# Patient Record
Sex: MALE | Race: WHITE | NOT HISPANIC OR LATINO | Employment: OTHER | URBAN - METROPOLITAN AREA
[De-identification: names, ages, dates, MRNs, and addresses within clinical notes are randomized per-mention and may not be internally consistent; named-entity substitution may affect disease eponyms.]

---

## 2019-07-29 ENCOUNTER — COMMUNICATION - HEALTHEAST (OUTPATIENT)
Dept: TELEHEALTH | Facility: CLINIC | Age: 69
End: 2019-07-29

## 2019-07-29 ENCOUNTER — AMBULATORY - HEALTHEAST (OUTPATIENT)
Dept: FAMILY MEDICINE | Facility: CLINIC | Age: 69
End: 2019-07-29

## 2019-07-29 ENCOUNTER — OFFICE VISIT - HEALTHEAST (OUTPATIENT)
Dept: FAMILY MEDICINE | Facility: CLINIC | Age: 69
End: 2019-07-29

## 2019-07-29 DIAGNOSIS — I10 ESSENTIAL HYPERTENSION: ICD-10-CM

## 2019-07-29 DIAGNOSIS — L30.9 CHRONIC DERMATITIS OF HANDS: ICD-10-CM

## 2019-07-29 RX ORDER — BETAMETHASONE DIPROPIONATE 0.05 %
OINTMENT (GRAM) TOPICAL
Status: SHIPPED | COMMUNITY
Start: 2018-02-14 | End: 2023-08-21

## 2019-07-29 RX ORDER — LORATADINE 10 MG/1
10 TABLET ORAL DAILY
Status: SHIPPED | COMMUNITY
Start: 2019-07-29

## 2019-07-29 RX ORDER — DIPHENHYDRAMINE HCL 25 MG
25 CAPSULE ORAL EVERY 6 HOURS PRN
Status: SHIPPED | COMMUNITY
Start: 2019-07-29 | End: 2023-08-21

## 2019-07-29 ASSESSMENT — MIFFLIN-ST. JEOR: SCORE: 1660.16

## 2019-10-22 ENCOUNTER — OFFICE VISIT - HEALTHEAST (OUTPATIENT)
Dept: FAMILY MEDICINE | Facility: CLINIC | Age: 69
End: 2019-10-22

## 2019-10-22 DIAGNOSIS — I10 ESSENTIAL HYPERTENSION: ICD-10-CM

## 2019-10-22 RX ORDER — AMLODIPINE BESYLATE 5 MG/1
5 TABLET ORAL DAILY
Qty: 90 TABLET | Refills: 3 | Status: SHIPPED | OUTPATIENT
Start: 2019-10-22 | End: 2021-07-30

## 2020-09-28 ENCOUNTER — OFFICE VISIT - HEALTHEAST (OUTPATIENT)
Dept: FAMILY MEDICINE | Facility: CLINIC | Age: 70
End: 2020-09-28

## 2020-09-28 DIAGNOSIS — I10 ESSENTIAL HYPERTENSION: ICD-10-CM

## 2020-09-28 RX ORDER — AMLODIPINE BESYLATE 5 MG/1
5 TABLET ORAL DAILY
Qty: 90 TABLET | Refills: 3 | Status: SHIPPED | OUTPATIENT
Start: 2020-09-28 | End: 2021-07-30

## 2021-01-15 ENCOUNTER — AMBULATORY - HEALTHEAST (OUTPATIENT)
Dept: OTHER | Facility: CLINIC | Age: 71
End: 2021-01-15

## 2021-04-15 ENCOUNTER — AMBULATORY - HEALTHEAST (OUTPATIENT)
Dept: NURSING | Facility: CLINIC | Age: 71
End: 2021-04-15

## 2021-05-06 ENCOUNTER — AMBULATORY - HEALTHEAST (OUTPATIENT)
Dept: NURSING | Facility: CLINIC | Age: 71
End: 2021-05-06

## 2021-06-02 NOTE — PROGRESS NOTES
Patient ID: Sage Jones is a 69 y.o. male.  /78   Pulse 72   Wt 193 lb 11.2 oz (87.9 kg)   SpO2 98%   BMI 28.40 kg/m      Assessment/Plan:                Diagnoses and all orders for this visit:    Essential hypertension  -     amLODIPine (NORVASC) 5 MG tablet; Take 1 tablet (5 mg total) by mouth daily.  Dispense: 90 tablet; Refill: 3      DISCUSSION  Continue amlodipine for blood pressure management.  Patient declines any consideration to obtain lab test to evaluate kidney function, blood sugar or cholesterol.  Subjective:     HPI    Sage Jones is a 69 y.o. male who is here today to follow-up on hypertension.  He is on amlodipine.  Blood pressure today is reasonable.  Patient states he monitors blood pressure at home.  Reports readings sometimes up to 140 but generally with any more favorable range.  Denies chest pain shortness of breath dizziness lightheadedness or syncope.  No lower extremity edema.  Patient expresses desire to take a more minimalist approach to his health care.  Today we discussed recommended routine health preventive measures including reviewing immunizations.  We discussed specific immunizations including tetanus with pertussis component, pneumonia vaccines, influenza vaccines.  He declines vaccines at this time but will consider this further in the future.  He has not had colon cancer screening.  He does not wish to pursue colonoscopy.  He is familiar with the Cologuard test and will consider this in the future but declines any consideration to proceed with this today.  He otherwise overall states he feels well and has no other concerns.    Review of Systems  Complete review of systems is obtained.  Other than the specific considerations noted above complete review of systems is negative.      Objective:   Medications:  Current Outpatient Medications   Medication Sig     amLODIPine (NORVASC) 5 MG tablet Take 1 tablet (5 mg total) by mouth daily.     ascorbic acid,  vitamin C, (VITAMIN C) 1000 MG tablet Take 1,000 mg by mouth daily.     b complex vitamins tablet Take 1 tablet by mouth daily.     betamethasone dipropionate (DIPROLENE) 0.05 % ointment Apply topically.     diphenhydrAMINE (BENADRYL) 25 mg capsule Take 25 mg by mouth every 6 (six) hours as needed for itching.     loratadine (CLARITIN) 10 mg tablet Take 10 mg by mouth daily.     Allergies:  No Known Allergies    Tobacco:   reports that he has never smoked. He has never used smokeless tobacco.     Physical Exam      /78   Pulse 72   Wt 193 lb 11.2 oz (87.9 kg)   SpO2 98%   BMI 28.40 kg/m        General: Patient alert no signs of distress    Heart: Regular rate and rhythm no murmur

## 2021-06-03 VITALS — WEIGHT: 200.8 LBS | HEIGHT: 69 IN | BODY MASS INDEX: 29.74 KG/M2

## 2021-06-03 VITALS
WEIGHT: 193.7 LBS | SYSTOLIC BLOOD PRESSURE: 136 MMHG | BODY MASS INDEX: 28.4 KG/M2 | DIASTOLIC BLOOD PRESSURE: 78 MMHG | OXYGEN SATURATION: 98 % | HEART RATE: 72 BPM

## 2021-06-11 NOTE — PROGRESS NOTES
"Sage Jones is a 70 y.o. male who is being evaluated via a billable telephone visit.      The patient has been notified of following:     \"This telephone visit will be conducted via a call between you and your physician/provider. We have found that certain health care needs can be provided without the need for a physical exam.  This service lets us provide the care you need with a short phone conversation.  If a prescription is necessary we can send it directly to your pharmacy.  If lab work is needed we can place an order for that and you can then stop by our lab to have the test done at a later time.    Telephone visits are billed at different rates depending on your insurance coverage. During this emergency period, for some insurers they may be billed the same as an in-person visit.  Please reach out to your insurance provider with any questions.    If during the course of the call the physician/provider feels a telephone visit is not appropriate, you will not be charged for this service.\"    Patient has given verbal consent to a Telephone visit? Yes    What phone number would you like to be contacted at? [phone]    Patient would like to receive their AVS by AVS Preference: Abelardo.    Sage Jones is a 70 y.o. male who is generally healthy.  He is treated for hypertension with amlodipine 5 mg.  I met him for the first time last July in 2019.  At that time had declined routine health considerations including immunization and cancer screening along with lab testing.  Patient reports he retired in August.  He feels well.  He checks blood pressure at home reading reported 130 systolic.  Denies chest pain or shortness of breath.  Feels no side effects of amlodipine.  Declines laboratory testing.  Declines considerations for immunization or other screening tests which are again discussed.    Assessment/Plan:      Diagnoses and all orders for this visit:    Essential hypertension  -     amLODIPine " (NORVASC) 5 MG tablet; Take 1 tablet (5 mg total) by mouth daily.  Dispense: 90 tablet; Refill: 3          Continue amlodipine.  Monitor blood pressure at home.  Follow-up in no more than 1 year.  Contemplate routine screening tests if he decides he would like to get them I be happy to set them up as discussed.      Phone call duration:  5 minutes    William Meng MD

## 2021-07-02 ENCOUNTER — RECORDS - HEALTHEAST (OUTPATIENT)
Dept: FAMILY MEDICINE | Facility: CLINIC | Age: 71
End: 2021-07-02

## 2021-07-04 NOTE — TELEPHONE ENCOUNTER
Telephone Encounter by Valerie Mason CMA at 7/2/2021 12:04 PM     Author: Valerie Mason CMA Service: -- Author Type: Certified Medical Assistant    Filed: 7/2/2021 12:05 PM Encounter Date: 7/2/2021 Status: Signed    : Valerie Mason CMA (Certified Medical Assistant)       Pt called and helped him schedule AWV appt to see Dr. Meng on Friday 07/30/2021 at 2 PM.

## 2021-07-04 NOTE — TELEPHONE ENCOUNTER
Telephone Encounter by Lidia Barr CMA at 7/2/2021 11:08 AM     Author: Lidia Barr CMA Service: -- Author Type: Certified Medical Assistant    Filed: 7/2/2021 11:08 AM Encounter Date: 7/2/2021 Status: Signed    : Lidia Barr CMA (Certified Medical Assistant)       lmtcb- pt is due for annual wellness physical. Please schedule with Chinyere Molina if pt would like to be seen.

## 2021-07-30 ENCOUNTER — OFFICE VISIT (OUTPATIENT)
Dept: FAMILY MEDICINE | Facility: CLINIC | Age: 71
End: 2021-07-30
Payer: MEDICARE

## 2021-07-30 VITALS
BODY MASS INDEX: 31.68 KG/M2 | SYSTOLIC BLOOD PRESSURE: 138 MMHG | HEIGHT: 69 IN | HEART RATE: 75 BPM | WEIGHT: 213.9 LBS | DIASTOLIC BLOOD PRESSURE: 78 MMHG | OXYGEN SATURATION: 98 %

## 2021-07-30 DIAGNOSIS — Z00.00 ENCOUNTER FOR ROUTINE ADULT HEALTH EXAMINATION WITHOUT ABNORMAL FINDINGS: Primary | ICD-10-CM

## 2021-07-30 DIAGNOSIS — I10 ESSENTIAL HYPERTENSION: ICD-10-CM

## 2021-07-30 PROCEDURE — 90715 TDAP VACCINE 7 YRS/> IM: CPT | Performed by: FAMILY MEDICINE

## 2021-07-30 PROCEDURE — G0009 ADMIN PNEUMOCOCCAL VACCINE: HCPCS | Performed by: FAMILY MEDICINE

## 2021-07-30 PROCEDURE — G0439 PPPS, SUBSEQ VISIT: HCPCS | Performed by: FAMILY MEDICINE

## 2021-07-30 PROCEDURE — 90472 IMMUNIZATION ADMIN EACH ADD: CPT | Performed by: FAMILY MEDICINE

## 2021-07-30 PROCEDURE — 99213 OFFICE O/P EST LOW 20 MIN: CPT | Mod: 25 | Performed by: FAMILY MEDICINE

## 2021-07-30 PROCEDURE — 90670 PCV13 VACCINE IM: CPT | Performed by: FAMILY MEDICINE

## 2021-07-30 RX ORDER — AMLODIPINE BESYLATE 5 MG/1
5 TABLET ORAL DAILY
Qty: 90 TABLET | Refills: 3 | Status: SHIPPED | OUTPATIENT
Start: 2021-07-30 | End: 2022-09-11

## 2021-07-30 ASSESSMENT — ACTIVITIES OF DAILY LIVING (ADL)
DRESSING/BATHING_DIFFICULTY: NO
CONCENTRATING,_REMEMBERING_OR_MAKING_DECISIONS_DIFFICULTY: NO
CURRENT_FUNCTION: NO ASSISTANCE NEEDED
TOILETING_ISSUES: NO
DIFFICULTY_EATING/SWALLOWING: NO
DIFFICULTY_COMMUNICATING: NO
WEAR_GLASSES_OR_BLIND: YES
HEARING_DIFFICULTY_OR_DEAF: NO
FALL_HISTORY_WITHIN_LAST_SIX_MONTHS: NO
DOING_ERRANDS_INDEPENDENTLY_DIFFICULTY: NO
WALKING_OR_CLIMBING_STAIRS_DIFFICULTY: NO
PATIENT_/_FAMILY_COMMUNICATION_STYLE: SPOKEN LANGUAGE (ENGLISH OR BILINGUAL)

## 2021-07-30 ASSESSMENT — MIFFLIN-ST. JEOR: SCORE: 1719.58

## 2021-07-30 NOTE — PROGRESS NOTES
SUBJECTIVE:   Sage Jones is a 71 year old male who presents for Preventive Visit.      Patient has been advised of split billing requirements and indicates understanding: Yes   Are you in the first 12 months of your Medicare coverage?  No    HPI  Do you feel safe in your environment? Yes    His medical history includes hypertension for which he takes amlodipine.  His blood pressure is reasonably controlled based on readings taken here.  Patient also reports favorable readings taken at home.  Declines laboratory testing.  Declines routine screenings such as colonoscopy prostate cancer screening of the leg.  He did have Covid in March 2020 he reports making a full recovery.  He has made the decision to receive some vaccines and has elected to have the pneumonia vaccine and tetanus vaccine with pertussis today.  He has elected to take a more conservative approach to his health care.  Discussed benefits of routine screening and immunizations today as we have in the past.  He is a [employer] .  He is .  Does not smoke or drink alcohol.  Denies other past medical history.  He has never had surgery.  Overall feels well.  Reviewed social and family history otherwise.    Have you ever done Advance Care Planning? (For example, a Health Directive, POLST, or a discussion with a medical provider or your loved ones about your wishes): Yes, advance care planning is on file.    Fallen 2 or more times in the past year?: No  Any fall with injury in the past year?: No    Cognitive Screening   1) Repeat 3 items  2) Clock draw: NORMAL  3) 3 item recall: Recalls 3 objects  Results: 3 items recalled: COGNITIVE IMPAIRMENT LESS LIKELY    Mini-CogTM Copyright LETICIA Jameson. Licensed by the author for use in [facility]; reprinted with permission (walter@.[facility]). All rights reserved.      Do you have sleep apnea, excessive snoring or daytime drowsiness?: no    Reviewed and updated as needed this visit by  "clinical staff  Reviewed and updated as needed this visit by Provider                Social History     Tobacco Use     Smoking status: Never Smoker     Smokeless tobacco: Never Used   Substance Use Topics     Alcohol use: Not on file     If you drink alcohol do you typically have >3 drinks per day or >7 drinks per week? No    Alcohol Use 7/30/2021   Prescreen: >3 drinks/day or >7 drinks/week? Not Applicable   Prescreen: >3 drinks/day or >7 drinks/week? -   No flowsheet data found.            Current providers sharing in care for this patient include: {  Patient Care Team:  William Meng MD as PCP - General  William Meng MD as Assigned PCP    The following health maintenance items are reviewed in Epic and correct as of today:  Health Maintenance Due   Topic Date Due     COLORECTAL CANCER SCREENING  Never done     HEPATITIS C SCREENING  Never done     DTAP/TDAP/TD IMMUNIZATION (1 - Tdap) Never done     LIPID  Never done     ZOSTER IMMUNIZATION (1 of 2) Never done     FALL RISK ASSESSMENT  Never done     Pneumococcal Vaccine: 65+ Years (1 of 1 - PPSV23) Never done     AORTIC ANEURYSM SCREENING (SYSTEM ASSIGNED)  Never done               Review of Systems  Complete review of systems is obtained.  Other than the specific considerations noted above complete review of systems is negative.      OBJECTIVE:   /78   Pulse 75   Ht 1.759 m (5' 9.25\")   Wt 97 kg (213 lb 14.4 oz)   SpO2 98%   BMI 31.36 kg/m   Estimated body mass index is 31.36 kg/m  as calculated from the following:    Height as of this encounter: 1.759 m (5' 9.25\").    Weight as of this encounter: 97 kg (213 lb 14.4 oz).  Physical Exam      General Appearance:    Alert, cooperative, no distress   Eyes:   No scleral icterus or conjunctival irritation       Lungs:     Clear to auscultation bilaterally, respirations unlabored, no wheezes or crackles   Heart:    Regular rate and rhythm,  No murmur   Extremities:  No edema, no joint swelling or " "redness, no evidence of any injuries   Skin:  No concerning skin findings, no suspicious moles, no rashes   Neurologic:  On gross examination there is no motor or sensory deficit.  Patient walks with a normal gait         ASSESSMENT / PLAN:   Sage was seen today for annual visit, imm/inj and hypertension.    Diagnoses and all orders for this visit:    Screen for colon cancer    Essential hypertension  -     amLODIPine (NORVASC) 5 MG tablet; Take 1 tablet (5 mg) by mouth daily    Other orders  -     REVIEW OF HEALTH MAINTENANCE PROTOCOL ORDERS  -     PNEUMOCOCCAL CONJ VACCINE 13 VALENT IM  -     TDAP VACCINE (Adacel, Boostrix)  [[id number]]             COUNSELING:  Reviewed preventive health counseling, as reflected in patient instructions       Regular exercise       Healthy diet/nutrition    Estimated body mass index is 31.36 kg/m  as calculated from the following:    Height as of this encounter: 1.759 m (5' 9.25\").    Weight as of this encounter: 97 kg (213 lb 14.4 oz).    Weight management plan: Discussed healthy diet and exercise guidelines    He reports that he has never smoked. He has never used smokeless tobacco.      Appropriate preventive services were discussed with this patient, including applicable screening as appropriate for cardiovascular disease, diabetes, osteopenia/osteoporosis, and glaucoma.  As appropriate for age/gender, discussed screening for colorectal cancer, prostate cancer, breast cancer, and cervical cancer. Checklist reviewing preventive services available has been given to the patient.    Reviewed patients plan of care and provided an AVS. The Basic Care Plan (routine screening as documented in Health Maintenance) for Sage meets the Care Plan requirement. This Care Plan has been established and reviewed with the Patient.    Counseling Resources:  ATP IV Guidelines  Pooled Cohorts Equation Calculator  Breast Cancer Risk Calculator  Breast Cancer: Medication to Reduce Risk  FRAX Risk " "Assessment  ICSI Preventive Guidelines  Dietary Guidelines for Americans, 2010  USDA's MyPlate  ASA Prophylaxis  Lung CA Screening    William Meng MD, MD  [facility]    Identified Health Risks:Answers for HPI/ROS submitted by the patient on 7/30/2021  In general, how would you rate your overall physical health?: good  Frequency of exercise:: 4-5 days/week  Do you usually eat at least 4 servings of fruit and vegetables a day, include whole grains & fiber, and avoid regularly eating high fat or \"junk\" foods? : Yes  Taking medications regularly:: Yes  Medication side effects:: None  Activities of Daily Living: no assistance needed  Home safety: no safety concerns identified  Hearing Impairment:: no hearing concerns  In the past 6 months, have you been bothered by leaking of urine?: No  In general, how would you rate your overall mental or emotional health?: excellent  Additional concerns today:: No    Conflicting answers have been found for some questions. Please document the patient's answers manually.       "

## 2021-10-16 ENCOUNTER — HEALTH MAINTENANCE LETTER (OUTPATIENT)
Age: 71
End: 2021-10-16

## 2022-06-10 ENCOUNTER — HOSPITAL ENCOUNTER (EMERGENCY)
Facility: CLINIC | Age: 72
Discharge: HOME | End: 2022-06-10
Attending: EMERGENCY MEDICINE | Admitting: EMERGENCY MEDICINE
Payer: MEDICARE

## 2022-06-10 VITALS
HEART RATE: 87 BPM | WEIGHT: 210 LBS | OXYGEN SATURATION: 100 % | SYSTOLIC BLOOD PRESSURE: 146 MMHG | BODY MASS INDEX: 30.79 KG/M2 | RESPIRATION RATE: 22 BRPM | DIASTOLIC BLOOD PRESSURE: 82 MMHG | TEMPERATURE: 97.9 F

## 2022-06-10 DIAGNOSIS — N13.9 URINARY OBSTRUCTION: ICD-10-CM

## 2022-06-10 DIAGNOSIS — N30.01 ACUTE CYSTITIS WITH HEMATURIA: ICD-10-CM

## 2022-06-10 LAB
ALBUMIN UR-MCNC: 20 MG/DL
APPEARANCE UR: CLEAR
BACTERIA #/AREA URNS HPF: ABNORMAL /HPF
BILIRUB UR QL STRIP: ABNORMAL
COLOR UR AUTO: ABNORMAL
GLUCOSE UR STRIP-MCNC: NEGATIVE MG/DL
HGB UR QL STRIP: NEGATIVE
KETONES UR STRIP-MCNC: ABNORMAL MG/DL
LEUKOCYTE ESTERASE UR QL STRIP: NEGATIVE
MUCOUS THREADS #/AREA URNS LPF: PRESENT /LPF
NITRATE UR QL: POSITIVE
PH UR STRIP: 7 [PH] (ref 5–7)
RBC URINE: <1 /HPF
SP GR UR STRIP: 1.01 (ref 1–1.03)
UROBILINOGEN UR STRIP-MCNC: 3 MG/DL
WBC URINE: 1 /HPF

## 2022-06-10 PROCEDURE — 250N000013 HC RX MED GY IP 250 OP 250 PS 637: Performed by: EMERGENCY MEDICINE

## 2022-06-10 PROCEDURE — 51702 INSERT TEMP BLADDER CATH: CPT

## 2022-06-10 PROCEDURE — 81001 URINALYSIS AUTO W/SCOPE: CPT | Performed by: EMERGENCY MEDICINE

## 2022-06-10 PROCEDURE — 87086 URINE CULTURE/COLONY COUNT: CPT | Performed by: EMERGENCY MEDICINE

## 2022-06-10 PROCEDURE — 99283 EMERGENCY DEPT VISIT LOW MDM: CPT

## 2022-06-10 RX ORDER — CEPHALEXIN 500 MG/1
500 CAPSULE ORAL 4 TIMES DAILY
Qty: 28 CAPSULE | Refills: 0 | Status: SHIPPED | OUTPATIENT
Start: 2022-06-10 | End: 2022-06-17

## 2022-06-10 RX ORDER — CEPHALEXIN 500 MG/1
500 CAPSULE ORAL ONCE
Status: COMPLETED | OUTPATIENT
Start: 2022-06-10 | End: 2022-06-10

## 2022-06-10 RX ORDER — LIDOCAINE HYDROCHLORIDE 20 MG/ML
6 JELLY TOPICAL ONCE
Status: DISCONTINUED | OUTPATIENT
Start: 2022-06-10 | End: 2022-06-10 | Stop reason: HOSPADM

## 2022-06-10 RX ADMIN — CEPHALEXIN 500 MG: 500 CAPSULE ORAL at 10:29

## 2022-06-10 NOTE — ED PROVIDER NOTES
History   Chief Complaint:  Urinary Retention       HPI   Sage Jones is a 72 year old male with history of hypertension who presents with inability to urinate since about Wednesday evening.  Patient had a couple of days of diarrhea prior to that which has since resolved.  Since Wednesday has been unable to get any urine out.  Does report history of frequency in the past.  Denies any nausea or vomiting or fevers.  Since Wednesday has been having progressively worsening lower abdominal fullness.  He did not notice any blood in his urine.  He takes some supplements for his prostate that he buys on the Internet.      Review of Systems   All other systems reviewed and are negative.    Allergies:  The patient has no known allergies.     Medications:  amLODIPine   diphenhydrAMINE  loratadine     Past Medical History:     HTN    Past Surgical History:    The patient denies past surgical history.     Family History:    The patient denies past family history.     Social History:  The patient presents to the ED with wife.    Physical Exam     Patient Vitals for the past 24 hrs:   BP Temp Temp src Pulse Resp SpO2 Weight   06/10/22 0945 135/79 -- -- 83 -- -- --   06/10/22 0930 (!) 141/79 -- -- 86 -- -- --   06/10/22 0915 136/75 -- -- 85 -- -- --   06/10/22 0900 139/79 -- -- 86 -- -- --   06/10/22 0801 (!) 197/125 97.9  F (36.6  C) Temporal 105 22 100 % 95.3 kg (210 lb)       Physical Exam  Gen: well appearing, in no acute distress  Oral : Mucous membranes moist,   Nose: No rhinorhea  Ears: External near normal, without drainage  Eyes: periorbital tissues and sclera normal   Neck: supple, no abnormal swelling  Lungs: Clear bilaterally, no tachypnea or distress, speaks full sentences  CV: Regular rate, regular rhythm  Abd: soft, nontender, nondistended, no rebound/guarding  : Leiva catheter in place with pink urine no clots  Ext: no lower extremity edema  Skin: warm, dry, well perfused, no rashes/bruising/lesions on  exposed skin  Neuro: alert, no gross motor or sensory deficits,   Psych: pleasant mood, normal affect      Emergency Department Course     Laboratory:  Labs Ordered and Resulted from Time of ED Arrival to Time of ED Departure   ROUTINE UA WITH MICROSCOPIC REFLEX TO CULTURE - Abnormal       Result Value    Color Urine Dark Yellow (*)     Appearance Urine Clear      Glucose Urine Negative      Bilirubin Urine Small (*)     Ketones Urine Trace (*)     Specific Gravity Urine 1.015      Blood Urine Negative      pH Urine 7.0      Protein Albumin Urine 20  (*)     Urobilinogen Urine 3.0 (*)     Nitrite Urine Positive (*)     Leukocyte Esterase Urine Negative      Bacteria Urine Few (*)     Mucus Urine Present (*)     RBC Urine <1      WBC Urine 1     URINE CULTURE      Emergency Department Course:         Reviewed:  I reviewed nursing notes and vitals    Assessments:  0845 I obtained history and examined the patient as noted above.   1010 I rechecked the patient and explained findings.     Interventions:  1029 Kefelx 500mg PO    Disposition:  The patient was discharged to home.     Impression & Plan     CMS Diagnoses:   and None      Medical Decision Making:  Patient presents to the Ed with acute urinary retention. No fevers and does not appear systemically ill. His urine does look infected and we will get him on some keflex. Exam benign. Feels much better after lim catheter placement. Will go ahead and refer him to outpatient urology clinic and start him on keflex. Will encourage return with fevers, worsening pain, or any other new symptoms. Patient and family are comfortable with management.    Diagnosis:    ICD-10-CM    1. Urinary obstruction  N13.9 Adult Urology Referral   2. Acute cystitis with hematuria  N30.01 Adult Urology Referral       Discharge Medications:  New Prescriptions    CEPHALEXIN (KEFLEX) 500 MG CAPSULE    Take 1 capsule (500 mg) by mouth 4 times daily for 7 days       Scribe Disclosure:  Trevor BENITEZ  Sabina, am serving as a scribe at 8:07 AM on 6/10/2022 to document services personally performed by Momo Garrett MD based on my observations and the provider's statements to me.              Momo Garrett MD  06/10/22 1056

## 2022-06-10 NOTE — ED NOTES
Catheter care and discharge instructions discussed with pt. AVS discussed. All questions answered.

## 2022-06-10 NOTE — ED TRIAGE NOTES
Pt last urinated Wednesday night. C/o bladder and abdominal pain. No hx retention. No recent surgery.

## 2022-06-11 ENCOUNTER — NURSE TRIAGE (OUTPATIENT)
Dept: NURSING | Facility: CLINIC | Age: 72
End: 2022-06-11
Payer: MEDICARE

## 2022-06-11 NOTE — TELEPHONE ENCOUNTER
Patient and patient's wife were calling to find out how to empty the catheter that was placed yesterday in the ED. Pt's wife said they were shown in the ED how to do it, but she was having difficulty and the bag now had 2000 ml of urine in the bag. After instructions, the wife was able to open the drainage tube and began draining the catheter. Pt's wife was grateful for the help.     Maria G Waggoner RN [facility] 6/11/2022 12:45 AM      Reason for Disposition    Health Information question, no triage required and triager able to answer question    Additional Information    Negative: [1] Caller is not with the adult (patient) AND [2] reporting urgent symptoms    Negative: Lab result questions    Negative: Medication questions    Negative: Caller can't be reached by phone    Negative: Caller has already spoken to PCP or another triager    Negative: RN needs further essential information from caller in order to complete triage    Negative: Requesting regular office appointment    Negative: [1] Caller requesting NON-URGENT health information AND [2] PCP's office is the best resource    Protocols used: INFORMATION ONLY CALL - NO TRIAGE-A-

## 2022-06-12 LAB — BACTERIA UR CULT: NO GROWTH

## 2022-06-15 DIAGNOSIS — R33.9 URINARY RETENTION: Primary | ICD-10-CM

## 2022-06-15 RX ORDER — TAMSULOSIN HYDROCHLORIDE 0.4 MG/1
0.4 CAPSULE ORAL DAILY
Qty: 30 CAPSULE | Refills: 0 | Status: SHIPPED | OUTPATIENT
Start: 2022-06-15 | End: 2022-07-07

## 2022-06-17 ENCOUNTER — NURSE TRIAGE (OUTPATIENT)
Dept: NURSING | Facility: CLINIC | Age: 72
End: 2022-06-17

## 2022-06-17 ENCOUNTER — OFFICE VISIT (OUTPATIENT)
Dept: UROLOGY | Facility: CLINIC | Age: 72
End: 2022-06-17
Payer: MEDICARE

## 2022-06-17 VITALS
SYSTOLIC BLOOD PRESSURE: 140 MMHG | DIASTOLIC BLOOD PRESSURE: 86 MMHG | HEIGHT: 69 IN | WEIGHT: 200 LBS | BODY MASS INDEX: 29.62 KG/M2

## 2022-06-17 DIAGNOSIS — N40.0 ENLARGED PROSTATE: Primary | ICD-10-CM

## 2022-06-17 DIAGNOSIS — R33.9 URINARY RETENTION: ICD-10-CM

## 2022-06-17 PROCEDURE — 99203 OFFICE O/P NEW LOW 30 MIN: CPT | Performed by: UROLOGY

## 2022-06-17 ASSESSMENT — PAIN SCALES - GENERAL: PAINLEVEL: NO PAIN (0)

## 2022-06-17 NOTE — LETTER
6/17/2022       RE: Sage Jones  [address] [id number]  [place] [address]     Dear Colleague,    Thank you for referring your patient, Sage Jones, to the [facility] [place] [facility]. Please see a copy of my visit note below.    [facility]  Main Office: [address]  [address]  [place] [address]       CHIEF COMPLAINT:  Urinary retention    HISTORY:   This is a 72-year-old woman who was in the emergency department 1 week ago with urinary retention.  He had diarrhea prior to this and then was not able to urinate for nearly 2 days.  He had a Leiva catheter placed in the emergency department.  It was not noted how much urine was retained in the bladder.  After he contacted our office for catheter removal we started him on Flomax, but he has only had 2 days of the Flomax so far.  He was sent home with Keflex but urine culture ended up coming back negative.    He reports that prior to this episode he was having a slow urinary stream.  He would usually have nocturia 1-2 times nightly.  He does not take any prostate related medications.  He says that his father and his brother did have some sorts of trouble with the prostate but he was not certain if it was enlarged prostate or prostate cancer.  He has no prior PSA history.          PAST MEDICAL HISTORY:   Past Medical History:   Diagnosis Date     Mumps        PAST SURGICAL HISTORY: History reviewed. No pertinent surgical history.    FAMILY HISTORY: History reviewed. No pertinent family history.    SOCIAL HISTORY:   Social History     Tobacco Use     Smoking status: Never Smoker     Smokeless tobacco: Never Used   Substance Use Topics     Alcohol use: Not on file        No Known Allergies      Current Outpatient Medications:      amLODIPine (NORVASC) 5 MG tablet, Take 1 tablet (5 mg) by mouth daily, Disp: 90 tablet, Rfl: 3     ascorbic acid, vitamin C, (VITAMIN C) 1000 MG  "tablet, [ASCORBIC ACID, VITAMIN C, (VITAMIN C) 1000 MG TABLET] Take 1,000 mg by mouth daily., Disp: , Rfl:      b complex vitamins tablet, [B COMPLEX VITAMINS TABLET] Take 1 tablet by mouth daily., Disp: , Rfl:      betamethasone dipropionate (DIPROLENE) 0.05 % ointment, [BETAMETHASONE DIPROPIONATE (DIPROLENE) 0.05 % OINTMENT] Apply topically., Disp: , Rfl:      cephALEXin (KEFLEX) 500 MG capsule, Take 1 capsule (500 mg) by mouth 4 times daily for 7 days, Disp: 28 capsule, Rfl: 0     diphenhydrAMINE (BENADRYL) 25 mg capsule, [DIPHENHYDRAMINE (BENADRYL) 25 MG CAPSULE] Take 25 mg by mouth every 6 (six) hours as needed for itching., Disp: , Rfl:      loratadine (CLARITIN) 10 mg tablet, [LORATADINE (CLARITIN) 10 MG TABLET] Take 10 mg by mouth daily., Disp: , Rfl:      tamsulosin (FLOMAX) 0.4 MG capsule, Take 1 capsule (0.4 mg) by mouth daily, Disp: 30 capsule, Rfl: 0    Review Of Systems:  Skin: No rash, pruritis, or skin pigmentation  Eyes: No changes in vision  Ears/Nose/Throat: No changes in hearing, no nosebleeds  Respiratory: No shortness of breath, dyspnea on exertion, cough, or hemoptysis  Cardiovascular: No chest pain or palpitations  Gastrointestinal: No diarrhea or constipation. No abdominal pain. No hematochezia  Genitourinary: see HPI  Musculoskeletal: No pain or swelling of joints, normal range of motion  Neurologic: No weakness or tremors  Psychiatric: No recent changes in memory or mood  Hematologic/Lymphatic/Immunologic: No easy bruising or enlarged lymph nodes  Endocrine: No weight gain or loss      PHYSICAL EXAM:    BP (!) 140/86   Ht 1.753 m (5' 9\")   Wt 90.7 kg (200 lb)   BMI 29.53 kg/m    General appearance: In NAD, conversant  HEENT: Normocephalic and atraumatic, anicteric sclera  Cardiovascular: Not examined  Respiratory: normal, non-labored breathing  Gastrointestinal: negative, Abdomen soft, non-tender, and non-distended.   Musculoskeletal: Not Examined  Peripheral Vascular/extremity: No " peripheral edema  Skin: Normal temperature, turgor, and texture. No rash  Psychiatric: Appropriate affect, alert and oriented to person, place, and time    Penis: Normal  Scrotal skin: Normal, no lesions  Testicles: Normal to palpation bilaterally  Epididymis: Normal to palpation bilaterally  Lymphatic: Normal inguinal lymph nodes    Digital Rectal Exam: The prostate is moderately enlarged and firm throughout as well.    Cystoscopy: Not done      PSA: None known    UA RESULTS:  Recent Labs   Lab Test 06/10/22  0853   COLOR Dark Yellow*   APPEARANCE Clear   URINEGLC Negative   URINEBILI Small*   URINEKETONE Trace*   SG 1.015   UBLD Negative   URINEPH 7.0   PROTEIN 20 *   NITRITE Positive*   LEUKEST Negative   RBCU <1   WBCU 1       Bladder Scan:     Other Labs:      Imaging Studies: None      CLINICAL IMPRESSION:   Urinary retention resolved   Firm prostate    PLAN:   Today in clinic we filled his bladder with 150 mL of water and he had a strong urge to urinate.  We remove the Leiva catheter and he voided 100 mL without difficulty.    I recommended to he Rich that he continue taking the Flomax daily.  I would like to follow him closely and see him back in 2 weeks for a urinalysis and bladder scan in order to make certain his bladder is emptying adequately.  We discussed my exam findings on digital rectal examination today.  Before I see him back in 2 weeks we will check a PSA and he may require repeat RENEE in 2 weeks as well.      Ford Villalobos MD

## 2022-06-17 NOTE — PROGRESS NOTES
[facility]  Main Office: [address]  [address]  [place] [address]       CHIEF COMPLAINT:  Urinary retention    HISTORY:   This is a 72-year-old gentleman who was in the emergency department 1 week ago with urinary retention.  He had diarrhea prior to this and then was not able to urinate for nearly 2 days.  He had a Leiva catheter placed in the emergency department.  It was not noted how much urine was retained in the bladder.  After he contacted our office for catheter removal we started him on Flomax, but he has only had 2 days of the Flomax so far.  He was sent home with Keflex but urine culture ended up coming back negative.    He reports that prior to this episode he was having a slow urinary stream.  He would usually have nocturia 1-2 times nightly.  He does not take any prostate related medications.  He says that his father and his brother did have some sorts of trouble with the prostate but he was not certain if it was enlarged prostate or prostate cancer.  He has no prior PSA history.          PAST MEDICAL HISTORY:   Past Medical History:   Diagnosis Date     Mumps        PAST SURGICAL HISTORY: History reviewed. No pertinent surgical history.    FAMILY HISTORY: History reviewed. No pertinent family history.    SOCIAL HISTORY:   Social History     Tobacco Use     Smoking status: Never Smoker     Smokeless tobacco: Never Used   Substance Use Topics     Alcohol use: Not on file        No Known Allergies      Current Outpatient Medications:      amLODIPine (NORVASC) 5 MG tablet, Take 1 tablet (5 mg) by mouth daily, Disp: 90 tablet, Rfl: 3     ascorbic acid, vitamin C, (VITAMIN C) 1000 MG tablet, [ASCORBIC ACID, VITAMIN C, (VITAMIN C) 1000 MG TABLET] Take 1,000 mg by mouth daily., Disp: , Rfl:      b complex vitamins tablet, [B COMPLEX VITAMINS TABLET] Take 1 tablet by mouth daily., Disp: , Rfl:      betamethasone dipropionate (DIPROLENE) 0.05 % ointment, [BETAMETHASONE DIPROPIONATE (DIPROLENE)  "0.05 % OINTMENT] Apply topically., Disp: , Rfl:      cephALEXin (KEFLEX) 500 MG capsule, Take 1 capsule (500 mg) by mouth 4 times daily for 7 days, Disp: 28 capsule, Rfl: 0     diphenhydrAMINE (BENADRYL) 25 mg capsule, [DIPHENHYDRAMINE (BENADRYL) 25 MG CAPSULE] Take 25 mg by mouth every 6 (six) hours as needed for itching., Disp: , Rfl:      loratadine (CLARITIN) 10 mg tablet, [LORATADINE (CLARITIN) 10 MG TABLET] Take 10 mg by mouth daily., Disp: , Rfl:      tamsulosin (FLOMAX) 0.4 MG capsule, Take 1 capsule (0.4 mg) by mouth daily, Disp: 30 capsule, Rfl: 0    Review Of Systems:  Skin: No rash, pruritis, or skin pigmentation  Eyes: No changes in vision  Ears/Nose/Throat: No changes in hearing, no nosebleeds  Respiratory: No shortness of breath, dyspnea on exertion, cough, or hemoptysis  Cardiovascular: No chest pain or palpitations  Gastrointestinal: No diarrhea or constipation. No abdominal pain. No hematochezia  Genitourinary: see HPI  Musculoskeletal: No pain or swelling of joints, normal range of motion  Neurologic: No weakness or tremors  Psychiatric: No recent changes in memory or mood  Hematologic/Lymphatic/Immunologic: No easy bruising or enlarged lymph nodes  Endocrine: No weight gain or loss      PHYSICAL EXAM:    BP (!) 140/86   Ht 1.753 m (5' 9\")   Wt 90.7 kg (200 lb)   BMI 29.53 kg/m    General appearance: In NAD, conversant  HEENT: Normocephalic and atraumatic, anicteric sclera  Cardiovascular: Not examined  Respiratory: normal, non-labored breathing  Gastrointestinal: negative, Abdomen soft, non-tender, and non-distended.   Musculoskeletal: Not Examined  Peripheral Vascular/extremity: No peripheral edema  Skin: Normal temperature, turgor, and texture. No rash  Psychiatric: Appropriate affect, alert and oriented to person, place, and time    Penis: Normal  Scrotal skin: Normal, no lesions  Testicles: Normal to palpation bilaterally  Epididymis: Normal to palpation bilaterally  Lymphatic: Normal " inguinal lymph nodes    Digital Rectal Exam: The prostate is moderately enlarged and firm throughout as well.    Cystoscopy: Not done      PSA: None known    UA RESULTS:  Recent Labs   Lab Test 06/10/22  0853   COLOR Dark Yellow*   APPEARANCE Clear   URINEGLC Negative   URINEBILI Small*   URINEKETONE Trace*   SG 1.015   UBLD Negative   URINEPH 7.0   PROTEIN 20 *   NITRITE Positive*   LEUKEST Negative   RBCU <1   WBCU 1       Bladder Scan:     Other Labs:      Imaging Studies: None      CLINICAL IMPRESSION:   Urinary retention resolved   Firm prostate    PLAN:   Today in clinic we filled his bladder with 150 mL of water and he had a strong urge to urinate.  We remove the Leiva catheter and he voided 100 mL without difficulty.    I recommended to he Rich that he continue taking the Flomax daily.  I would like to follow him closely and see him back in 2 weeks for a urinalysis and bladder scan in order to make certain his bladder is emptying adequately.  We discussed my exam findings on digital rectal examination today.  Before I see him back in 2 weeks we will check a PSA and he may require repeat RENEE in 2 weeks as well.      Ford Villalobos MD

## 2022-06-17 NOTE — TELEPHONE ENCOUNTER
S-(situation): inquiry regarding urinary output after lim removal     B-(background): lim removal today    A-(assessment): Patient has voided x 3, spurts. More than just a dribble.     R-(recommendations): Routed to Urology to review and follow up call to patient for further assistance/ instruction.       Additional Information    Nursing judgment    Answer Assessment - Initial Assessment Questions  Patient and wife calling regarding amount of urine to expect after catheter lim removal? Patient has voided 3x's and flow is like spurts - more than a dribble....     This nurse reviewed Urology Clinic visit today.     Reiterated the need to take Flomax daily as directed. In addition, increase water intake. Follow up in 2 weeks as noted. Patient verbalized an understanding and agreed with plan.     Routed to Urology to review and follow up call to patient for concern regarding expected  output?    Protocols used: INFORMATION ONLY CALL - NO TRIAGE-A-[place]

## 2022-06-27 ENCOUNTER — LAB (OUTPATIENT)
Dept: LAB | Facility: CLINIC | Age: 72
End: 2022-06-27
Payer: MEDICARE

## 2022-06-27 DIAGNOSIS — N40.0 ENLARGED PROSTATE: ICD-10-CM

## 2022-06-27 DIAGNOSIS — Z13.220 SCREENING FOR HYPERLIPIDEMIA: ICD-10-CM

## 2022-06-27 DIAGNOSIS — Z11.59 NEED FOR HEPATITIS C SCREENING TEST: ICD-10-CM

## 2022-06-27 LAB — HCV AB SERPL QL IA: NONREACTIVE

## 2022-06-27 PROCEDURE — 36415 COLL VENOUS BLD VENIPUNCTURE: CPT

## 2022-06-27 PROCEDURE — 80061 LIPID PANEL: CPT

## 2022-06-27 PROCEDURE — 84153 ASSAY OF PSA TOTAL: CPT

## 2022-06-27 PROCEDURE — 86803 HEPATITIS C AB TEST: CPT

## 2022-06-29 ENCOUNTER — OFFICE VISIT (OUTPATIENT)
Dept: UROLOGY | Facility: CLINIC | Age: 72
End: 2022-06-29
Payer: MEDICARE

## 2022-06-29 VITALS
WEIGHT: 200 LBS | HEIGHT: 69 IN | BODY MASS INDEX: 29.62 KG/M2 | SYSTOLIC BLOOD PRESSURE: 132 MMHG | DIASTOLIC BLOOD PRESSURE: 86 MMHG

## 2022-06-29 DIAGNOSIS — R33.9 INCOMPLETE BLADDER EMPTYING: ICD-10-CM

## 2022-06-29 DIAGNOSIS — N40.0 ENLARGED PROSTATE: Primary | ICD-10-CM

## 2022-06-29 DIAGNOSIS — R39.89 HARD, FIRM PROSTATE: ICD-10-CM

## 2022-06-29 LAB
ALBUMIN UR-MCNC: NEGATIVE MG/DL
APPEARANCE UR: CLEAR
BILIRUB UR QL STRIP: NEGATIVE
CHOLEST SERPL-MCNC: 170 MG/DL
COLOR UR AUTO: YELLOW
FASTING STATUS PATIENT QL REPORTED: NO
GLUCOSE UR STRIP-MCNC: NEGATIVE MG/DL
HDLC SERPL-MCNC: 48 MG/DL
HGB UR QL STRIP: NEGATIVE
KETONES UR STRIP-MCNC: NEGATIVE MG/DL
LDLC SERPL CALC-MCNC: 97 MG/DL
LEUKOCYTE ESTERASE UR QL STRIP: NEGATIVE
NITRATE UR QL: NEGATIVE
NONHDLC SERPL-MCNC: 122 MG/DL
PH UR STRIP: 7 [PH] (ref 5–7)
PSA SERPL-MCNC: 84.2 UG/L (ref 0–4)
RESIDUAL VOLUME (RV) (EXTERNAL): 174
SP GR UR STRIP: 1.01 (ref 1–1.03)
TRIGL SERPL-MCNC: 124 MG/DL
UROBILINOGEN UR STRIP-ACNC: 0.2 E.U./DL

## 2022-06-29 PROCEDURE — 81003 URINALYSIS AUTO W/O SCOPE: CPT | Mod: QW | Performed by: UROLOGY

## 2022-06-29 PROCEDURE — 99214 OFFICE O/P EST MOD 30 MIN: CPT | Mod: 25 | Performed by: UROLOGY

## 2022-06-29 PROCEDURE — 51798 US URINE CAPACITY MEASURE: CPT | Performed by: UROLOGY

## 2022-06-29 RX ORDER — TAMSULOSIN HYDROCHLORIDE 0.4 MG/1
0.4 CAPSULE ORAL DAILY
Qty: 90 CAPSULE | Refills: 3 | Status: SHIPPED | OUTPATIENT
Start: 2022-06-29 | End: 2022-12-06

## 2022-06-29 ASSESSMENT — PAIN SCALES - GENERAL: PAINLEVEL: NO PAIN (0)

## 2022-06-29 NOTE — PROGRESS NOTES
"Office Visit Note  [facility]  [phone]    UROLOGIC DIAGNOSES:   Enlarged prostate  History of retention    CURRENT INTERVENTIONS:   flomax    HISTORY:   Rich returns to clinic today for urologic follow-up.  He reports doing well since catheter removal.  He says that the Flomax is helping and he is very happy with his urinary stream at this time.  He is getting up twice a night to urinate, which is also good improvement for him.      PAST MEDICAL HISTORY:   Past Medical History:   Diagnosis Date     Mumps        PAST SURGICAL HISTORY: History reviewed. No pertinent surgical history.    FAMILY HISTORY: History reviewed. No pertinent family history.    SOCIAL HISTORY:   Social History     Socioeconomic History     Marital status:      Spouse name: None     Number of children: None     Years of education: None     Highest education level: None   Tobacco Use     Smoking status: Never Smoker     Smokeless tobacco: Never Used       Review Of Systems:  Skin: No rash, pruritis, or skin pigmentation  Eyes: No changes in vision  Ears/Nose/Throat: No changes in hearing, no nosebleeds  Respiratory: No shortness of breath, dyspnea on exertion, cough, or hemoptysis  Cardiovascular: No chest pain or palpitations  Gastrointestinal: No diarrhea or constipation. No abdominal pain. No hematochezia  Genitourinary: see HPI  Musculoskeletal: No pain or swelling of joints, normal range of motion  Neurologic: No weakness or tremors  Psychiatric: No recent changes in memory or mood  Hematologic/Lymphatic/Immunologic: No easy bruising or enlarged lymph nodes  Endocrine: No weight gain or loss      PHYSICAL EXAM:    /86   Ht 1.753 m (5' 9\")   Wt 90.7 kg (200 lb)   BMI 29.53 kg/m      Constitutional: Well developed. Conversant and in no acute distress  Eyes: Anicteric sclera, conjunctiva clear, normal extraocular movements  ENT: Normocephalic and atraumatic,   Skin: Warm and dry. No rashes or lesions  Cardiac: " No peripheral edema  Back/Flank: Not done  CNS/PNS: Normal musculature and movements, moves all extremities normally  Respiratory: Normal non-labored breathing  Abdomen: Soft nontender and nondistended  Peripheral Vascular: No peripheral edema  Mental Status/Psych: Alert and Oriented x 3. Normal mood and affect    Penis: Not done  Scrotal Skin: Not done  Testicles: Not done  Epididymis: Not done  Digital Rectal Exam: The prostate is firm throughout.    Cystoscopy: Not done    Imaging: None    Urinalysis: UA RESULTS:  Recent Labs   Lab Test 06/29/22  1040 06/10/22  0853   COLOR Yellow Dark Yellow*   APPEARANCE Clear Clear   URINEGLC Negative Negative   URINEBILI Negative Small*   URINEKETONE Negative Trace*   SG 1.015 1.015   UBLD Negative Negative   URINEPH 7.0 7.0   PROTEIN Negative 20 *   UROBILINOGEN 0.2  --    NITRITE Negative Positive*   LEUKEST Negative Negative   RBCU  --  <1   WBCU  --  1       PSA: Pending    Post Void Residual: 174mL    Other labs: None today      IMPRESSION:  Firm prostate  Incomplete bladder emptying    PLAN:  He continues to have incomplete bladder emptying today but his urinary retention is resolved.  We did discuss the incomplete bladder emptying and for now we will need to continue to monitor this issue.  He is very happy on the Flomax so I wrote him a new 90-day prescription for the Flomax today.    He has a firm prostate on digital rectal examination today.  He checked his PSA 2 days ago but the result is still pending at this time.  I counseled him that regardless of the PSA result I do have some concerns that he may have a prostate cancer present so I recommended that we begin working out for potential prostate cancer with an MRI of the prostate.  We discussed the test and he agreed to it.    I will contact him once his MRI and PSA results are back.      Ford Villalobos M.D.

## 2022-06-29 NOTE — NURSING NOTE
Chief Complaint   Patient presents with     Enlarged prostate     Pt here for follow up     Urinary Retention     Pt states he is feeling good after starting flomax.    PVR:  174 mL    Lou Espinoza, CMA

## 2022-06-29 NOTE — LETTER
6/29/2022       RE: Sage Jones  [address] [address]  [place] [address]     Dear Colleague,    Thank you for referring your patient, Sage Jones, to the [facility] [place] at [facility]. Please see a copy of my visit note below.    Office Visit Note  [facility]  [phone]    UROLOGIC DIAGNOSES:   Enlarged prostate  History of retention    CURRENT INTERVENTIONS:   flomax    HISTORY:   Rich returns to clinic today for urologic follow-up.  He reports doing well since catheter removal.  He says that the Flomax is helping and he is very happy with his urinary stream at this time.  He is getting up twice a night to urinate, which is also good improvement for him.      PAST MEDICAL HISTORY:   Past Medical History:   Diagnosis Date     Mumps        PAST SURGICAL HISTORY: History reviewed. No pertinent surgical history.    FAMILY HISTORY: History reviewed. No pertinent family history.    SOCIAL HISTORY:   Social History     Socioeconomic History     Marital status:      Spouse name: None     Number of children: None     Years of education: None     Highest education level: None   Tobacco Use     Smoking status: Never Smoker     Smokeless tobacco: Never Used       Review Of Systems:  Skin: No rash, pruritis, or skin pigmentation  Eyes: No changes in vision  Ears/Nose/Throat: No changes in hearing, no nosebleeds  Respiratory: No shortness of breath, dyspnea on exertion, cough, or hemoptysis  Cardiovascular: No chest pain or palpitations  Gastrointestinal: No diarrhea or constipation. No abdominal pain. No hematochezia  Genitourinary: see HPI  Musculoskeletal: No pain or swelling of joints, normal range of motion  Neurologic: No weakness or tremors  Psychiatric: No recent changes in memory or mood  Hematologic/Lymphatic/Immunologic: No easy bruising or enlarged lymph nodes  Endocrine: No weight gain or loss      PHYSICAL  "EXAM:    /86   Ht 1.753 m (5' 9\")   Wt 90.7 kg (200 lb)   BMI 29.53 kg/m      Constitutional: Well developed. Conversant and in no acute distress  Eyes: Anicteric sclera, conjunctiva clear, normal extraocular movements  ENT: Normocephalic and atraumatic,   Skin: Warm and dry. No rashes or lesions  Cardiac: No peripheral edema  Back/Flank: Not done  CNS/PNS: Normal musculature and movements, moves all extremities normally  Respiratory: Normal non-labored breathing  Abdomen: Soft nontender and nondistended  Peripheral Vascular: No peripheral edema  Mental Status/Psych: Alert and Oriented x 3. Normal mood and affect    Penis: Not done  Scrotal Skin: Not done  Testicles: Not done  Epididymis: Not done  Digital Rectal Exam: The prostate is firm throughout.    Cystoscopy: Not done    Imaging: None    Urinalysis: UA RESULTS:  Recent Labs   Lab Test 06/29/22  1040 06/10/22  0853   COLOR Yellow Dark Yellow*   APPEARANCE Clear Clear   URINEGLC Negative Negative   URINEBILI Negative Small*   URINEKETONE Negative Trace*   SG 1.015 1.015   UBLD Negative Negative   URINEPH 7.0 7.0   PROTEIN Negative 20 *   UROBILINOGEN 0.2  --    NITRITE Negative Positive*   LEUKEST Negative Negative   RBCU  --  <1   WBCU  --  1       PSA: Pending    Post Void Residual: 174mL    Other labs: None today      IMPRESSION:  Firm prostate  Incomplete bladder emptying    PLAN:  He continues to have incomplete bladder emptying today but his urinary retention is resolved.  We did discuss the incomplete bladder emptying and for now we will need to continue to monitor this issue.  He is very happy on the Flomax so I wrote him a new 90-day prescription for the Flomax today.    He has a firm prostate on digital rectal examination today.  He checked his PSA 2 days ago but the result is still pending at this time.  I counseled him that regardless of the PSA result I do have some concerns that he may have a prostate cancer present so I recommended that " we begin working out for potential prostate cancer with an MRI of the prostate.  We discussed the test and he agreed to it.    I will contact him once his MRI and PSA results are back.      Ford Villalobos M.D.

## 2022-06-30 ENCOUNTER — TELEPHONE (OUTPATIENT)
Dept: UROLOGY | Facility: CLINIC | Age: 72
End: 2022-06-30

## 2022-06-30 ENCOUNTER — TELEPHONE (OUTPATIENT)
Dept: SURGERY | Facility: CLINIC | Age: 72
End: 2022-06-30

## 2022-06-30 DIAGNOSIS — R97.20 ELEVATED PSA: Primary | ICD-10-CM

## 2022-06-30 RX ORDER — CIPROFLOXACIN 500 MG/1
500 TABLET, FILM COATED ORAL 2 TIMES DAILY
Qty: 6 TABLET | Refills: 0 | Status: SHIPPED | OUTPATIENT
Start: 2022-06-30 | End: 2022-07-03

## 2022-06-30 NOTE — TELEPHONE ENCOUNTER
Spoke on phone re: PSA results  He had a firm RENEE as well  We discussed my concern that he will very likely has a prostate cancer present and there is also high possibility that the cancer is already metastatic.    I recommended to Rich and his wife that he get his MRI of the prostate performed as well as prostate biopsy as soon as possible.  They voiced understanding.    We will schedule the MRI of the prostate and prostate biopsy for the next available date.    We discussed the risks of prostate biopsy including the risks of bleeding and infection.  He understands the risks.  Ciprofloxacin was prescribed for prophylaxis.

## 2022-06-30 NOTE — TELEPHONE ENCOUNTER
Informed patient that Antibiotic was for Prostate Biopsy. He is to take morning prior to day of procedure. Patient and his wife expressed understanding and also are aware to follow discharge paperwork for when to stop blood-thinners prior. He is aware he should eat prior to procedure, to not feel light-headed after procedure. Also, patient is aware to do an enema prior.     Jacquie Pantoja LPN

## 2022-06-30 NOTE — TELEPHONE ENCOUNTER
[facility] Call Center    Phone Message    May a detailed message be left on voicemail: yes     Reason for Call: Medication Question or concern regarding medication   Prescription Clarification  Name of Medication: ciprofloxacin (CIPRO) 500 MG tablet  Prescribing Provider: Wilfredo   Pharmacy: [id number] - [place] - [address]   What on the order needs clarification? Patient's spouse is calling with questions about this medication and if they are supposed to start the medication today.  Please reach out to patient.          Action Taken: Other: Urology    Travel Screening: Not Applicable

## 2022-07-07 DIAGNOSIS — R33.9 URINARY RETENTION: ICD-10-CM

## 2022-07-07 RX ORDER — TAMSULOSIN HYDROCHLORIDE 0.4 MG/1
CAPSULE ORAL
Qty: 90 CAPSULE | Refills: 3 | Status: SHIPPED | OUTPATIENT
Start: 2022-07-07 | End: 2023-12-11

## 2022-07-12 ENCOUNTER — TELEPHONE (OUTPATIENT)
Dept: UROLOGY | Facility: CLINIC | Age: 72
End: 2022-07-12

## 2022-07-12 DIAGNOSIS — R97.20 ELEVATED PROSTATE SPECIFIC ANTIGEN (PSA): Primary | ICD-10-CM

## 2022-07-12 NOTE — TELEPHONE ENCOUNTER
[facility] Call Center    Phone Message    May a detailed message be left on voicemail: yes     Reason for Call: Order(s): Other:   Reason for requested: PSA labs  Date needed: ASAP - pt has lab appt tomorrow  Provider name: Wilfredo Downing is asking for a phone call after orders have been placed so he knows if he can still make his lab appt tomorrow.  Thanks!      Action Taken: Other: Urology    Travel Screening: Not Applicable

## 2022-07-13 ENCOUNTER — LAB (OUTPATIENT)
Dept: LAB | Facility: CLINIC | Age: 72
End: 2022-07-13
Payer: MEDICARE

## 2022-07-13 DIAGNOSIS — R97.20 ELEVATED PROSTATE SPECIFIC ANTIGEN (PSA): ICD-10-CM

## 2022-07-13 PROCEDURE — 84153 ASSAY OF PSA TOTAL: CPT

## 2022-07-13 PROCEDURE — 36415 COLL VENOUS BLD VENIPUNCTURE: CPT

## 2022-07-14 LAB — PSA SERPL-MCNC: 92.8 UG/L (ref 0–4)

## 2022-07-19 ENCOUNTER — ANCILLARY PROCEDURE (OUTPATIENT)
Dept: MRI IMAGING | Facility: CLINIC | Age: 72
End: 2022-07-19
Attending: UROLOGY
Payer: MEDICARE

## 2022-07-19 DIAGNOSIS — R39.89 HARD, FIRM PROSTATE: ICD-10-CM

## 2022-07-19 PROCEDURE — G1010 CDSM STANSON: HCPCS | Performed by: RADIOLOGY

## 2022-07-19 PROCEDURE — 72197 MRI PELVIS W/O & W/DYE: CPT | Mod: MG | Performed by: RADIOLOGY

## 2022-07-19 PROCEDURE — A9585 GADOBUTROL INJECTION: HCPCS | Performed by: RADIOLOGY

## 2022-07-19 RX ORDER — GADOBUTROL 604.72 MG/ML
10 INJECTION INTRAVENOUS ONCE
Status: COMPLETED | OUTPATIENT
Start: 2022-07-19 | End: 2022-07-19

## 2022-07-19 RX ADMIN — GADOBUTROL 9 ML: 604.72 INJECTION INTRAVENOUS at 17:52

## 2022-07-19 NOTE — DISCHARGE INSTRUCTIONS
MRI Contrast Discharge Instructions    The IV contrast you received today will pass out of your body in your  urine. This will happen in the next 24 hours. You will not feel this process.  Your urine will not change color.    Drink at least 4 extra glasses of water or juice today (unless your doctor  has restricted your fluids). This reduces the stress on your kidneys.  You may take your regular medicines.    If you are on dialysis: It is best to have dialysis today.    If you have a reaction: Most reactions happen right away. If you have  any new symptoms after leaving the hospital (such as hives or swelling),  call your hospital at the correct number below. Or call your family doctor.  If you have breathing distress or wheezing, call 911.    Special instructions: ***    I have read and understand the above information.    Signature:______________________________________ Date:___________    Staff:__________________________________________ Date:___________     Time:__________    [facility] Departments:    ___[place]: [phone]  ___[facility]: [phone]  ___[place]: [phone] ___[facility]: [phone]  ___[facility]: [phone]  ___[facility]: [phone]  ___[phone]  ___[facility]: [phone]  ___[phone]

## 2022-07-20 ENCOUNTER — VIRTUAL VISIT (OUTPATIENT)
Dept: UROLOGY | Facility: CLINIC | Age: 72
End: 2022-07-20
Payer: MEDICARE

## 2022-07-20 VITALS — HEIGHT: 69 IN | BODY MASS INDEX: 29.62 KG/M2 | WEIGHT: 200 LBS

## 2022-07-20 DIAGNOSIS — R97.20 ELEVATED PROSTATE SPECIFIC ANTIGEN (PSA): Primary | ICD-10-CM

## 2022-07-20 PROCEDURE — 99214 OFFICE O/P EST MOD 30 MIN: CPT | Mod: 95 | Performed by: UROLOGY

## 2022-07-20 RX ORDER — CIPROFLOXACIN 500 MG/1
500 TABLET, FILM COATED ORAL 2 TIMES DAILY
Qty: 6 TABLET | Refills: 0 | Status: SHIPPED | OUTPATIENT
Start: 2022-07-20 | End: 2022-07-23

## 2022-07-20 ASSESSMENT — PAIN SCALES - GENERAL: PAINLEVEL: NO PAIN (0)

## 2022-07-20 NOTE — LETTER
7/20/2022       RE: Sage Jones  [address] [address]  [place] [address]     Dear Colleague,    Thank you for referring your patient, Sage Jones, to the [facility] [place] at [facility]. Please see a copy of my visit note below.    PT WILL MEET YOU IN MYCHART    Rich Jones is a 72 year old who is being evaluated via a billable video visit.      How would you like to obtain your AVS? MyChart  If the video visit is dropped, the invitation should be resent by: Text to cell phone: [phone]  Will anyone else be joining your video visit? No        Office Visit Note  [facility]  [phone]    UROLOGIC DIAGNOSES:   Elevated PSA  History of retention  Incomplete bladder emptying    CURRENT INTERVENTIONS:   Flomax    HISTORY:   Rich is set up for virtual visit today along with his wife to go over his MRI results.  After our last conversation over the phone he decided to proceed with the MRI but declined to schedule his prostate biopsy.  His MRI of the prostate showed a 79 g prostate and was rated as PI-RADS 5, showing a very high likelihood of prostate cancer.  The majority of the prostate was involved with prostate cancer.      PAST MEDICAL HISTORY:   Past Medical History:   Diagnosis Date     Mumps        PAST SURGICAL HISTORY: History reviewed. No pertinent surgical history.    FAMILY HISTORY: No family history on file.    SOCIAL HISTORY:   Social History     Tobacco Use     Smoking status: Never Smoker     Smokeless tobacco: Never Used   Substance Use Topics     Alcohol use: Not on file       REVIEW OF SYSTEMS:  Skin: No rash, pruritis, or skin pigmentation  Eyes: No changes in vision  Ears/Nose/Throat: No changes in hearing, no nosebleeds  Respiratory: No shortness of breath, dyspnea on exertion, cough, or hemoptysis  Cardiovascular: No chest pain or palpitations  Gastrointestinal: No diarrhea or constipation. No abdominal  pain. No hematochezia  Genitourinary: see HPI  Musculoskeletal: No pain or swelling of joints, normal range of motion  Neurologic: No weakness or tremors  Psychiatric: No recent changes in memory or mood  Hematologic/Lymphatic/Immunologic: No easy bruising or enlarged lymph nodes  Endocrine: No weight gain or loss      PHYSICAL EXAM:    General: Alert and oriented to time, place, and self. In NAD   HEENT: Head AT/NC, EOMI, CN Grossly intact   Lungs: no respiratory distress, or pursed lip breathing   Heart: No obvious jugular venous distension present   Musculoskeltal: Normal movements. Normal appearing musculature  Skin: no suspicious lesions or rashes   Neuro: Alert, oriented, speech and mentation normal; moving all 4 extremities equally.   Psych: affect and mood normal    Imaging: None    Urinalysis: UA RESULTS:  Recent Labs   Lab Test 06/29/22  1040 06/10/22  0853   COLOR Yellow Dark Yellow*   APPEARANCE Clear Clear   URINEGLC Negative Negative   URINEBILI Negative Small*   URINEKETONE Negative Trace*   SG 1.015 1.015   UBLD Negative Negative   URINEPH 7.0 7.0   PROTEIN Negative 20 *   UROBILINOGEN 0.2  --    NITRITE Negative Positive*   LEUKEST Negative Negative   RBCU  --  <1   WBCU  --  1       PSA: 92.8    Post Void Residual:     Other labs: None today      IMPRESSION:  Elevated PSA    PLAN:  His PSA is significant elevated and his MRI confirms that he likely has prostate cancer involving much of the prostate.  I again recommended that he proceed with prostate biopsy.  We discussed prostate biopsy again today along with its risks and expected recovery.  All of their questions were answered.  He is willing to proceed with prostate biopsy at this time.  We will get him scheduled in clinic for the next available biopsy.      Ford Villalobos M.D.              Video-Visit Details  Video Start Time: 10:51 AM  Type of service:  Video Visit  Video End Time:11:01 AM  Originating Location (pt. Location):  Home  Distant Location (provider location):  [facility] [place]   Platform used for Video Visit: Rosa

## 2022-07-20 NOTE — PROGRESS NOTES
PT WILL MEET YOU IN [employer]    Rich Jones is a 72 year old who is being evaluated via a billable video visit.      How would you like to obtain your AVS? [employer]ger[employer]oat  If the video visit is dropped, the invitation should be resent by: Text to cell phone: [phone]  Will anyone else be joining your video visit? No        Office Visit Note  [facility]  [phone]    UROLOGIC DIAGNOSES:   Elevated PSA  History of retention  Incomplete bladder emptying    CURRENT INTERVENTIONS:   Flomax    HISTORY:   Rich is set up for virtual visit today along with his wife to go over his MRI results.  After our last conversation over the phone he decided to proceed with the MRI but declined to schedule his prostate biopsy.  His MRI of the prostate showed a 79 g prostate and was rated as PI-RADS 5, showing a very high likelihood of prostate cancer.  The majority of the prostate was involved with prostate cancer.      PAST MEDICAL HISTORY:   Past Medical History:   Diagnosis Date     Mumps        PAST SURGICAL HISTORY: History reviewed. No pertinent surgical history.    FAMILY HISTORY: No family history on file.    SOCIAL HISTORY:   Social History     Tobacco Use     Smoking status: Never Smoker     Smokeless tobacco: Never Used   Substance Use Topics     Alcohol use: Not on file       REVIEW OF SYSTEMS:  Skin: No rash, pruritis, or skin pigmentation  Eyes: No changes in vision  Ears/Nose/Throat: No changes in hearing, no nosebleeds  Respiratory: No shortness of breath, dyspnea on exertion, cough, or hemoptysis  Cardiovascular: No chest pain or palpitations  Gastrointestinal: No diarrhea or constipation. No abdominal pain. No hematochezia  Genitourinary: see HPI  Musculoskeletal: No pain or swelling of joints, normal range of motion  Neurologic: No weakness or tremors  Psychiatric: No recent changes in memory or mood  Hematologic/Lymphatic/Immunologic: No easy bruising or enlarged lymph nodes  Endocrine: No weight gain or  loss      PHYSICAL EXAM:    General: Alert and oriented to time, place, and self. In NAD   HEENT: Head AT/NC, EOMI, CN Grossly intact   Lungs: no respiratory distress, or pursed lip breathing   Heart: No obvious jugular venous distension present   Musculoskeltal: Normal movements. Normal appearing musculature  Skin: no suspicious lesions or rashes   Neuro: Alert, oriented, speech and mentation normal; moving all 4 extremities equally.   Psych: affect and mood normal    Imaging: None    Urinalysis: UA RESULTS:  Recent Labs   Lab Test 06/29/22  1040 06/10/22  0853   COLOR Yellow Dark Yellow*   APPEARANCE Clear Clear   URINEGLC Negative Negative   URINEBILI Negative Small*   URINEKETONE Negative Trace*   SG 1.015 1.015   UBLD Negative Negative   URINEPH 7.0 7.0   PROTEIN Negative 20 *   UROBILINOGEN 0.2  --    NITRITE Negative Positive*   LEUKEST Negative Negative   RBCU  --  <1   WBCU  --  1       PSA: 92.8    Post Void Residual:     Other labs: None today      IMPRESSION:  Elevated PSA    PLAN:  His PSA is significant elevated and his MRI confirms that he likely has prostate cancer involving much of the prostate.  I again recommended that he proceed with prostate biopsy.  We discussed prostate biopsy again today along with its risks and expected recovery.  All of their questions were answered.  He is willing to proceed with prostate biopsy at this time.  We will get him scheduled in clinic for the next available biopsy.      Ford Villalobos M.D.              Video-Visit Details    Video Start Time: 10:51 AM    Type of service:  Video Visit    Video End Time:11:01 AM    Originating Location (pt. Location): Home    Distant Location (provider location):  [facility] [place]     Platform used for Video Visit: ozuke

## 2022-07-26 ENCOUNTER — OFFICE VISIT (OUTPATIENT)
Dept: UROLOGY | Facility: CLINIC | Age: 72
End: 2022-07-26
Payer: MEDICARE

## 2022-07-26 VITALS
HEIGHT: 69 IN | WEIGHT: 200 LBS | DIASTOLIC BLOOD PRESSURE: 78 MMHG | BODY MASS INDEX: 29.62 KG/M2 | OXYGEN SATURATION: 99 % | HEART RATE: 96 BPM | SYSTOLIC BLOOD PRESSURE: 132 MMHG

## 2022-07-26 DIAGNOSIS — R97.20 ELEVATED PROSTATE SPECIFIC ANTIGEN (PSA): Primary | ICD-10-CM

## 2022-07-26 DIAGNOSIS — R39.89 HARD, FIRM PROSTATE: ICD-10-CM

## 2022-07-26 PROCEDURE — 55700 PR BIOPSY OF PROSTATE,NEEDLE/PUNCH: CPT | Performed by: UROLOGY

## 2022-07-26 PROCEDURE — 88305 TISSUE EXAM BY PATHOLOGIST: CPT | Performed by: PATHOLOGY

## 2022-07-26 PROCEDURE — 76942 ECHO GUIDE FOR BIOPSY: CPT | Performed by: UROLOGY

## 2022-07-26 ASSESSMENT — PAIN SCALES - GENERAL: PAINLEVEL: NO PAIN (0)

## 2022-07-26 NOTE — LETTER
7/26/2022       RE: Sage Jones  [address] [id number]  [place] [address]     Dear Colleague,    Thank you for referring your patient, Sage Jones, to the [facility] [place] at [facility]. Please see a copy of my visit note below.    Sage Jones is here for a transrectal altrasound guided needle biopsy of the prostate for a significant risk of potentially lethal prostate cancer.    The risks, benefits, of the procudure were discussed.  All questions were answered.  A written informed consent was obtained.      PSA Tumor Marker   Date Value Ref Range Status   07/13/2022 92.80 (H) 0.00 - 4.00 ug/L Final   06/27/2022 84.20 (H) 0.00 - 4.00 ug/L Final       An enema was completed and 500 mg of Cipro twice daily was started prior to the biopsy.  After obtaining informed consent patient was placed in lateral decubitus position.  The ultrasound probe was placed in the rectum.  The prostate was numbed using ultrasound guidance with 1% lidocaine 5 mls along each nerve bundle.        US images were used to guide the biopsies of the prostate.  12 cores were taken with 6 on each side, 2 at the base,  2 at the midgland and  2 at the apex.  The patient tolerated the procedure well.      We will follow up with the results in 7-10 days and contact patient with these results.      Ford Villalobos MD

## 2022-07-26 NOTE — NURSING NOTE
"Chief Complaint   Patient presents with     Elevated PSA     Patient here today for Transrectal Ultrasound Guided Prostate Biopsy        Blood pressure 132/78, pulse 96, height 1.753 m (5' 9\"), weight 90.7 kg (200 lb), SpO2 99 %. Body mass index is 29.53 kg/m .    Patient Active Problem List   Diagnosis     Essential hypertension     Patient is Religious       No Known Allergies    Current Outpatient Medications   Medication Sig Dispense Refill     amLODIPine (NORVASC) 5 MG tablet Take 1 tablet (5 mg) by mouth daily 90 tablet 3     ascorbic acid, vitamin C, (VITAMIN C) 1000 MG tablet [ASCORBIC ACID, VITAMIN C, (VITAMIN C) 1000 MG TABLET] Take 1,000 mg by mouth daily.       b complex vitamins tablet [B COMPLEX VITAMINS TABLET] Take 1 tablet by mouth daily.       betamethasone dipropionate (DIPROLENE) 0.05 % ointment [BETAMETHASONE DIPROPIONATE (DIPROLENE) 0.05 % OINTMENT] Apply topically.       diphenhydrAMINE (BENADRYL) 25 mg capsule [DIPHENHYDRAMINE (BENADRYL) 25 MG CAPSULE] Take 25 mg by mouth every 6 (six) hours as needed for itching.       loratadine (CLARITIN) 10 mg tablet [LORATADINE (CLARITIN) 10 MG TABLET] Take 10 mg by mouth daily.       tamsulosin (FLOMAX) 0.4 MG capsule Take 1 capsule by mouth once daily 90 capsule 3     tamsulosin (FLOMAX) 0.4 MG capsule Take 1 capsule (0.4 mg) by mouth daily 90 capsule 3       Social History     Tobacco Use     Smoking status: Never Smoker     Smokeless tobacco: Never Used             Procedure was explained to patient prior to performing said procedure. The patient signed the consent form and all questions were answered prior to the procedure. Any pre-procedural antibiotics were given according to the performing physicians recommendation. Pt's information was confirmed on samples and samples were sent for analysis. Paient reviewed information on labels sent with patient and confirmed the accuracy of all the labels.    Consent read and signed: Yes   No Known " Allergies  Performing Physician: Dr. Villalobos  Antibiotic taken?  Yes  Aspirin or other blood thinning medications discontinued 7-10 days:  Yes  Time of Fleet's enema:  Yes  Patient given Gentamicin intramuscular injection 30 minutes prior to procedure No    12 samples were taken from the right and left, medial and lateral base, mid, and apex of the prostate respectively. NO additional samples were taken.  Vitals were repeated prior to patient leaving and instructions for postTransrectal Ultrasound Guided Prostate Biopsy care were explained to the patient.         LAUREN Lobo  7/26/2022  3:42 PM

## 2022-07-26 NOTE — PATIENT INSTRUCTIONS
[facility] Urology  Transrectal Ultrasound  Post Operative Information    The physician who performed your Transrectal Ultrasound is  (telephone number [phone]).  Please contact this doctor if you have any problems or questions.  If unable to reach your doctor, please return to the Emergency Department.    Take one antibiotic the evening of the procedure and then as directed on your prescription.  Drink at least 6-8 glasses of fluids for the first 48 hours.  Avoid heavy lifting and strenuous activity for 48 hours.  Avoid sexual intercourse for the first 24 hours.  No aspirin or ibuprofen products (Motrin, Advil, Nuprin, ect.) for one week.  You may take acetaminophen (Tylenol) for pain.  You may notice a small amount of blood on the tissue after a bowel movement.  You may pass blood with clots in your urine following the procedure.  The amount will decrease with time but may be visible for up to two weeks.   You make have blood in your semen for 4 weeks after the procedure.  You may experience mild perineal (groin area) discomfort after the procedure.  Please call you doctor if you have any of the follow symptoms:  Fever  Increase in the amount of blood passed  Severe discomfort or pain

## 2022-07-26 NOTE — PROGRESS NOTES
Sage Jones is here for a transrectal altrasound guided needle biopsy of the prostate for a significant risk of potentially lethal prostate cancer.    The risks, benefits, of the procudure were discussed.  All questions were answered.  A written informed consent was obtained.      PSA Tumor Marker   Date Value Ref Range Status   07/13/2022 92.80 (H) 0.00 - 4.00 ug/L Final   06/27/2022 84.20 (H) 0.00 - 4.00 ug/L Final       An enema was completed and 500 mg of Cipro twice daily was started prior to the biopsy.  After obtaining informed consent patient was placed in lateral decubitus position.  The ultrasound probe was placed in the rectum.  The prostate was numbed using ultrasound guidance with 1% lidocaine 5 mls along each nerve bundle.        US images were used to guide the biopsies of the prostate.  12 cores were taken with 6 on each side, 2 at the base,  2 at the midgland and  2 at the apex.  The patient tolerated the procedure well.      We will follow up with the results in 7-10 days and contact patient with these results.

## 2022-07-28 LAB
PATH REPORT.COMMENTS IMP SPEC: ABNORMAL
PATH REPORT.COMMENTS IMP SPEC: ABNORMAL
PATH REPORT.COMMENTS IMP SPEC: YES
PATH REPORT.FINAL DX SPEC: ABNORMAL
PATH REPORT.GROSS SPEC: ABNORMAL
PATH REPORT.MICROSCOPIC SPEC OTHER STN: ABNORMAL
PATH REPORT.RELEVANT HX SPEC: ABNORMAL
PHOTO IMAGE: ABNORMAL

## 2022-07-29 ENCOUNTER — VIRTUAL VISIT (OUTPATIENT)
Dept: UROLOGY | Facility: CLINIC | Age: 72
End: 2022-07-29
Payer: MEDICARE

## 2022-07-29 VITALS — WEIGHT: 200 LBS | HEIGHT: 69 IN | BODY MASS INDEX: 29.62 KG/M2

## 2022-07-29 DIAGNOSIS — C61 PROSTATE CANCER (H): Primary | ICD-10-CM

## 2022-07-29 PROCEDURE — 99214 OFFICE O/P EST MOD 30 MIN: CPT | Mod: 95 | Performed by: UROLOGY

## 2022-07-29 ASSESSMENT — PAIN SCALES - GENERAL: PAINLEVEL: NO PAIN (0)

## 2022-07-29 NOTE — LETTER
7/29/2022       RE: Sage Jones  [address] [id number]  [place] [address]     Dear Colleague,    Thank you for referring your patient, Sage Jones, to the [facility] [place] at [facility]. Please see a copy of my visit note below.    ** Patient will meet you in My Chart    Rich Jones is a 72 year old who is being evaluated via a billable video visit.      How would you like to obtain your AVS? MyChart  If the video visit is dropped, the invitation should be resent by: Text to cell phone: [phone]  Will anyone else be joining your video visit? Yes wife Radha        Office Visit Note  [facility]  [phone]    UROLOGIC DIAGNOSES:   Ridgeway 4+5=9 prostate cancer  History of retention  Incomplete bladder emptying    CURRENT INTERVENTIONS:   Flomax    HISTORY:   Rich is a set up for virtual visit to go over his prostate biopsy results from earlier this week.  On his template biopsy, every single cores positive for prostate cancer.  Macarena score ranged from Macarena grade 7 up to Ridgeway 4+5 = 9 prostate cancer.  He reports feeling well since his biopsy and he has had no urinary difficulties since the biopsy.      PAST MEDICAL HISTORY:   Past Medical History:   Diagnosis Date     Mumps        PAST SURGICAL HISTORY: History reviewed. No pertinent surgical history.    FAMILY HISTORY: History reviewed. No pertinent family history.    SOCIAL HISTORY:   Social History     Tobacco Use     Smoking status: Never Smoker     Smokeless tobacco: Never Used   Substance Use Topics     Alcohol use: Not on file       REVIEW OF SYSTEMS:  Skin: No rash, pruritis, or skin pigmentation  Eyes: No changes in vision  Ears/Nose/Throat: No changes in hearing, no nosebleeds  Respiratory: No shortness of breath, dyspnea on exertion, cough, or hemoptysis  Cardiovascular: No chest pain or palpitations  Gastrointestinal: No diarrhea or constipation. No  abdominal pain. No hematochezia  Genitourinary: see HPI  Musculoskeletal: No pain or swelling of joints, normal range of motion  Neurologic: No weakness or tremors  Psychiatric: No recent changes in memory or mood  Hematologic/Lymphatic/Immunologic: No easy bruising or enlarged lymph nodes  Endocrine: No weight gain or loss      PHYSICAL EXAM:    General: Alert and oriented to time, place, and self. In NAD   HEENT: Head AT/NC, EOMI, CN Grossly intact   Lungs: no respiratory distress, or pursed lip breathing   Heart: No obvious jugular venous distension present   Musculoskeltal: Normal movements. Normal appearing musculature  Skin: no suspicious lesions or rashes   Neuro: Alert, oriented, speech and mentation normal; moving all 4 extremities equally.   Psych: affect and mood normal    Imaging: None    Urinalysis: UA RESULTS:  Recent Labs   Lab Test 06/29/22  1040 06/10/22  0853   COLOR Yellow Dark Yellow*   APPEARANCE Clear Clear   URINEGLC Negative Negative   URINEBILI Negative Small*   URINEKETONE Negative Trace*   SG 1.015 1.015   UBLD Negative Negative   URINEPH 7.0 7.0   PROTEIN Negative 20 *   UROBILINOGEN 0.2  --    NITRITE Negative Positive*   LEUKEST Negative Negative   RBCU  --  <1   WBCU  --  1       PSA: 92.8    Post Void Residual:     Other labs: None today      IMPRESSION:  High risk prostate cancer    PLAN:  He has been diagnosed with a high-volume, high-grade, high risk prostate cancer.  We discussed his cancer diagnosis for virtual visit today.  He will need to undergo a metastatic work-up as there are concerns for metastatic potential.  I recommended that he proceed with a CT scan and bone scan as soon as possible.  We also discussed potential treatment options but I did advise him that, regardless of the option chosen, he will need to begin hormonal therapy as soon as possible.  We discussed hormonal therapy in detail today along with its risks and he wishes to proceed.    Unfortunately I will be  out of town for the next 2 weeks.  However, I will have my office arrange for him to have a CT scan and bone scan performed at the next available date followed by a Lupron injection with my nurse soon after that.  I will expect that he will receive a 6-month Lupron injection.  The only exception would be if he had bone metastases he would need to begin Firmagon first.    We will have arranged that he has a CT scan and bone scan followed by Lupron injection at the soonest available dates.  When I return I will follow-up on his progress.      Ford Villalobos M.D.              Video-Visit Details    Video Start Time: 9:58 AM    Type of service:  Video Visit    Video End Time:10:11 AM    Originating Location (pt. Location): Home    Distant Location (provider location):  [facility] [place]     Platform used for Video Visit: Rosa

## 2022-07-29 NOTE — PROGRESS NOTES
** Patient will meet you in My Chart    Rich Jones is a 72 year old who is being evaluated via a billable video visit.      How would you like to obtain your AVS? MyChart  If the video visit is dropped, the invitation should be resent by: Text to cell phone: [phone]  Will anyone else be joining your video visit? Yes wife Radha        Office Visit Note  [facility]  [phone]    UROLOGIC DIAGNOSES:   Agenda 4+5=9 prostate cancer  History of retention  Incomplete bladder emptying    CURRENT INTERVENTIONS:   Flomax    HISTORY:   Rich is a set up for virtual visit to go over his prostate biopsy results from earlier this week.  On his template biopsy, every single cores positive for prostate cancer.  Agenda score ranged from Macarena grade 7 up to Macarena 4+5 = 9 prostate cancer.  He reports feeling well since his biopsy and he has had no urinary difficulties since the biopsy.      PAST MEDICAL HISTORY:   Past Medical History:   Diagnosis Date     Mumps        PAST SURGICAL HISTORY: History reviewed. No pertinent surgical history.    FAMILY HISTORY: History reviewed. No pertinent family history.    SOCIAL HISTORY:   Social History     Tobacco Use     Smoking status: Never Smoker     Smokeless tobacco: Never Used   Substance Use Topics     Alcohol use: Not on file       REVIEW OF SYSTEMS:  Skin: No rash, pruritis, or skin pigmentation  Eyes: No changes in vision  Ears/Nose/Throat: No changes in hearing, no nosebleeds  Respiratory: No shortness of breath, dyspnea on exertion, cough, or hemoptysis  Cardiovascular: No chest pain or palpitations  Gastrointestinal: No diarrhea or constipation. No abdominal pain. No hematochezia  Genitourinary: see HPI  Musculoskeletal: No pain or swelling of joints, normal range of motion  Neurologic: No weakness or tremors  Psychiatric: No recent changes in memory or mood  Hematologic/Lymphatic/Immunologic: No easy bruising or enlarged lymph nodes  Endocrine: No weight  gain or loss      PHYSICAL EXAM:    General: Alert and oriented to time, place, and self. In NAD   HEENT: Head AT/NC, EOMI, CN Grossly intact   Lungs: no respiratory distress, or pursed lip breathing   Heart: No obvious jugular venous distension present   Musculoskeltal: Normal movements. Normal appearing musculature  Skin: no suspicious lesions or rashes   Neuro: Alert, oriented, speech and mentation normal; moving all 4 extremities equally.   Psych: affect and mood normal    Imaging: None    Urinalysis: UA RESULTS:  Recent Labs   Lab Test 06/29/22  1040 06/10/22  0853   COLOR Yellow Dark Yellow*   APPEARANCE Clear Clear   URINEGLC Negative Negative   URINEBILI Negative Small*   URINEKETONE Negative Trace*   SG 1.015 1.015   UBLD Negative Negative   URINEPH 7.0 7.0   PROTEIN Negative 20 *   UROBILINOGEN 0.2  --    NITRITE Negative Positive*   LEUKEST Negative Negative   RBCU  --  <1   WBCU  --  1       PSA: 92.8    Post Void Residual:     Other labs: None today      IMPRESSION:  High risk prostate cancer    PLAN:  He has been diagnosed with a high-volume, high-grade, high risk prostate cancer.  We discussed his cancer diagnosis for virtual visit today.  He will need to undergo a metastatic work-up as there are concerns for metastatic potential.  I recommended that he proceed with a CT scan and bone scan as soon as possible.  We also discussed potential treatment options but I did advise him that, regardless of the option chosen, he will need to begin hormonal therapy as soon as possible.  We discussed hormonal therapy in detail today along with its risks and he wishes to proceed.    Unfortunately I will be out of town for the next 2 weeks.  However, I will have my office arrange for him to have a CT scan and bone scan performed at the next available date followed by a Lupron injection with my nurse soon after that.  I will expect that he will receive a 6-month Lupron injection.  The only exception would be if he  had bone metastases he would need to begin Firmagon first.    We will have arranged that he has a CT scan and bone scan followed by Lupron injection at the soonest available dates.  When I return I will follow-up on his progress.      Ford Villalobos M.D.              Video-Visit Details    Video Start Time: 9:58 AM    Type of service:  Video Visit    Video End Time:10:11 AM    Originating Location (pt. Location): Home    Distant Location (provider location):  [facility]OLOG[facility] [place]     Platform used for Video Visit: [employer]

## 2022-08-05 ENCOUNTER — HOSPITAL ENCOUNTER (OUTPATIENT)
Dept: CT IMAGING | Facility: CLINIC | Age: 72
Discharge: HOME | End: 2022-08-05
Attending: UROLOGY
Payer: MEDICARE

## 2022-08-05 ENCOUNTER — HOSPITAL ENCOUNTER (OUTPATIENT)
Dept: NUCLEAR MEDICINE | Facility: CLINIC | Age: 72
Setting detail: NUCLEAR MEDICINE
Discharge: HOME | End: 2022-08-05
Attending: UROLOGY
Payer: MEDICARE

## 2022-08-05 DIAGNOSIS — C61 PROSTATE CANCER (H): ICD-10-CM

## 2022-08-05 LAB
CREAT BLD-MCNC: 0.6 MG/DL (ref 0.7–1.3)
GFR SERPL CREATININE-BSD FRML MDRD: >60 ML/MIN/1.73M2

## 2022-08-05 PROCEDURE — 74177 CT ABD & PELVIS W/CONTRAST: CPT | Mod: MG

## 2022-08-05 PROCEDURE — 78306 BONE IMAGING WHOLE BODY: CPT | Mod: MG

## 2022-08-05 PROCEDURE — 343N000001 HC RX 343: Performed by: UROLOGY

## 2022-08-05 PROCEDURE — 250N000009 HC RX 250: Performed by: UROLOGY

## 2022-08-05 PROCEDURE — 82565 ASSAY OF CREATININE: CPT

## 2022-08-05 PROCEDURE — G1010 CDSM STANSON: HCPCS

## 2022-08-05 PROCEDURE — A9561 TC99M OXIDRONATE: HCPCS | Performed by: UROLOGY

## 2022-08-05 PROCEDURE — 250N000011 HC RX IP 250 OP 636: Performed by: UROLOGY

## 2022-08-05 RX ORDER — IOPAMIDOL 755 MG/ML
500 INJECTION, SOLUTION INTRAVASCULAR ONCE
Status: COMPLETED | OUTPATIENT
Start: 2022-08-05 | End: 2022-08-05

## 2022-08-05 RX ADMIN — Medication 25.5 MILLICURIE: at 08:50

## 2022-08-05 RX ADMIN — SODIUM CHLORIDE 55 ML: 9 INJECTION, SOLUTION INTRAVENOUS at 09:54

## 2022-08-05 RX ADMIN — IOPAMIDOL 90 ML: 755 INJECTION, SOLUTION INTRAVENOUS at 09:53

## 2022-08-08 ENCOUNTER — ALLIED HEALTH/NURSE VISIT (OUTPATIENT)
Dept: UROLOGY | Facility: CLINIC | Age: 72
End: 2022-08-08
Payer: MEDICARE

## 2022-08-08 DIAGNOSIS — Z53.9 ERRONEOUS ENCOUNTER--DISREGARD: Primary | ICD-10-CM

## 2022-08-09 NOTE — NURSING NOTE
Chief Complaint   Patient presents with     Prostate Cancer     PATIENT HERE TODAY FOR LUPRON INJECTION         DUE TO PATIENT CT AND BONE SCAN RESULT NOT   SURE IF HE ABLE TO RECEIVE AN LUPRON INJECTION  WILL CALL AND RESCHEDULE ONCE DR LOYA OK  WHICH INJECTION HE CAN RECEIVE  AND ORDERS ARE IN.      Ceci Webb, RMA

## 2022-08-15 ENCOUNTER — TELEPHONE (OUTPATIENT)
Dept: UROLOGY | Facility: CLINIC | Age: 72
End: 2022-08-15

## 2022-08-15 NOTE — TELEPHONE ENCOUNTER
----- Message from Ford Villalobos MD sent at 8/15/2022  5:07 AM CDT -----  Imaging studies reviewed  He needs his 6 month lupron ASAP  Today if possible, please    ----- Message -----  From: Shanta Rosado  Sent: 8/8/2022   2:13 PM CDT  To: Ford Villalobos MD    Patient in for Lupron today but CT looks equivocal.  Can you decide which way you want to go?  Lupron or Firmagon?   Thanks, Nan    Patient will be calling us Monday.  C

## 2022-08-18 ENCOUNTER — TELEPHONE (OUTPATIENT)
Dept: SURGERY | Facility: CLINIC | Age: 72
End: 2022-08-18

## 2022-08-18 NOTE — TELEPHONE ENCOUNTER
Patient never received his hormonal therapy in our clinic to I contacted him today. He is pursuing hormonal therapy and radiotherapy at the [facility]

## 2022-09-10 DIAGNOSIS — I10 ESSENTIAL HYPERTENSION: ICD-10-CM

## 2022-09-11 RX ORDER — AMLODIPINE BESYLATE 5 MG/1
TABLET ORAL
Qty: 90 TABLET | Refills: 0 | Status: SHIPPED | OUTPATIENT
Start: 2022-09-11 | End: 2022-12-08

## 2022-09-11 NOTE — TELEPHONE ENCOUNTER
"Routing refill request to provider for review/approval because:  Patient needs to be seen because it has been more than 1 year since last office visit.    Last Written Prescription Date:  7/30/21  Last Fill Quantity: 90,  # refills: 3   Last office visit provider:  7/30/21     Requested Prescriptions   Pending Prescriptions Disp Refills     amLODIPine (NORVASC) 5 MG tablet [Pharmacy Med Name: amLODIPine Besylate 5 MG Oral Tablet] 90 tablet 0     Sig: Take 1 tablet by mouth once daily       Calcium Channel Blockers Protocol  Failed - 9/10/2022 10:27 AM        Failed - Recent (12 mo) or future (30 days) visit within the authorizing provider's specialty     Patient has had an office visit with the authorizing provider or a provider within the authorizing providers department within the previous 12 mos or has a future within next 30 days. See \"Patient Info\" tab in inbasket, or \"Choose Columns\" in Meds & Orders section of the refill encounter.              Passed - Blood pressure under 140/90 in past 12 months     BP Readings from Last 3 Encounters:   07/26/22 132/78   06/29/22 132/86   06/17/22 (!) 140/86                 Passed - Medication is active on med list        Passed - Patient is age 18 or older        Passed - Normal serum creatinine on file in past 12 months     Recent Labs   Lab Test 08/05/22  0946   CR 0.6*       Ok to refill medication if creatinine is low               Shanna Mcwilliams, RN 09/10/22 7:27 PM  "

## 2022-09-25 ENCOUNTER — HEALTH MAINTENANCE LETTER (OUTPATIENT)
Age: 72
End: 2022-09-25

## 2022-10-04 PROBLEM — Z78.9 OTHER SPECIFIED HEALTH STATUS: Status: ACTIVE | Noted: 2021-01-15

## 2022-10-26 NOTE — PROGRESS NOTES
" Patient ID: Sage Jones is a 69 y.o. male.  /78   Pulse 78   Ht 5' 9.25\" (1.759 m)   Wt 200 lb 12.8 oz (91.1 kg)   SpO2 98%   BMI 29.44 kg/m      Assessment/Plan:                   Diagnoses and all orders for this visit:    Essential hypertension    Chronic dermatitis of hands        DISCUSSION  See discussion below.  Recommend continue amlodipine for blood pressure control.  Blood pressure is reasonable today.  Continue cautious use of topical corticosteroids for control of hand dermatitis as needed.  Subjective:     HPI    Sage Jones is a 69 y.o. male who is a new patient to the clinic.  He is 69 he has hypertension he is on amlodipine.  He has dermatitis of the hands and utilizes more potent topical corticosteroids.  He is previously seen in the [facility] system.  I was able to review some records.  Patient states that he has no specific concerns would like to establish care have medications refilled if needed.  Patient declines considerations for routine health prevention all of which were reviewed today.  In review of records it is noted that patient has historically declined most routine health prevention.    He is a [employer] .  He is .  He does not smoke or drink alcohol.  He denies any other past medical history.  He has never had surgery.  His medications are as listed.  He denies any allergies.  Family history both parents are .  Mother  at 42 and had some type of liver abnormality.  Father has substance abuse issue and  at age 66.    Review of Systems  Complete review of systems is obtained.  Other than the specific considerations noted above complete review of systems is negative.          Objective:   Medications:  Current Outpatient Medications   Medication Sig     amLODIPine (NORVASC) 5 MG tablet      ascorbic acid, vitamin C, (VITAMIN C) 1000 MG tablet Take 1,000 mg by mouth daily.     b complex vitamins tablet Take 1 tablet by " Patient arrived to 8 center room 94 from transplant clinic. VSS. Pt A&Ox3. Patient here for OLT workup in anticipation for Let hip replacement.    "mouth daily.     betamethasone dipropionate (DIPROLENE) 0.05 % ointment Apply topically.     diphenhydrAMINE (BENADRYL) 25 mg capsule Take 25 mg by mouth every 6 (six) hours as needed for itching.     loratadine (CLARITIN) 10 mg tablet Take 10 mg by mouth daily.       Allergies:  No Known Allergies    Tobacco:   reports that he has never smoked. He has never used smokeless tobacco.     Physical Exam          /78   Pulse 78   Ht 5' 9.25\" (1.759 m)   Wt 200 lb 12.8 oz (91.1 kg)   SpO2 98%   BMI 29.44 kg/m          General Appearance:    Alert, cooperative, no distress   Eyes:   No scleral icterus or conjunctival irritation       Ears:    Normal TM's and external ear canals, both ears   Throat:   Lips, mucosa, and tongue normal; teeth and gums normal   Neck:   Supple, symmetrical, trachea midline, no adenopathy;        thyroid:  No enlargement/tenderness/nodules   Lungs:     Clear to auscultation bilaterally, respirations unlabored, no wheezes or crackles   Heart:    Regular rate and rhythm,  No murmur   Abdomen:    Soft, no distention, no tenderness on palpation, no masses, no organomegaly     Extremities:  No edema, no joint swelling or redness, no evidence of any injuries   Skin:  No concerning skin findings, no suspicious moles, no rashes   Neurologic:  On gross examination there is no motor or sensory deficit.  Patient walks with a normal gait                           "

## 2022-12-06 DIAGNOSIS — N40.0 ENLARGED PROSTATE: ICD-10-CM

## 2022-12-06 RX ORDER — TAMSULOSIN HYDROCHLORIDE 0.4 MG/1
0.4 CAPSULE ORAL DAILY
Qty: 90 CAPSULE | Refills: 3 | Status: SHIPPED | OUTPATIENT
Start: 2022-12-06 | End: 2022-12-07

## 2022-12-06 NOTE — TELEPHONE ENCOUNTER
Medication Question or Refill    Contacts       Type Contact Phone/Fax    12/06/2022 03:36 PM CST Phone (Incoming) Rich Jones (Self) [phone] (H)          What medication are you calling about (include dose and sig)?: Flomax    Controlled Substance Agreement on file:   CSA -- Patient Level:    CSA: None found at the patient level.       Who prescribed the medication?: Dr. Meng    Do you need a refill? Yes: pt would like to dble up on Rx; as he did night of 12/5 and saw improvement    When did you use the medication last? 12/5/2022    Preferred Pharmacy:     [facility] [address] [place] [address] [address]   [address] [facility] [address]  Phone: [phone] Fax: [phone]      Could we send this information to you in [place] or would you prefer to receive a phone call?:   Patient would prefer a phone call   Okay to leave a detailed message?: Yes at Cell number on file:    Telephone Information:   Mobile [phone]

## 2023-05-27 ENCOUNTER — HOSPITAL ENCOUNTER (EMERGENCY)
Facility: CLINIC | Age: 73
Discharge: HOME | End: 2023-05-28
Attending: EMERGENCY MEDICINE | Admitting: EMERGENCY MEDICINE
Payer: MEDICARE

## 2023-05-27 ENCOUNTER — APPOINTMENT (OUTPATIENT)
Dept: RADIOLOGY | Facility: CLINIC | Age: 73
End: 2023-05-27
Attending: EMERGENCY MEDICINE
Payer: MEDICARE

## 2023-05-27 VITALS
BODY MASS INDEX: 29.62 KG/M2 | HEIGHT: 69 IN | OXYGEN SATURATION: 100 % | HEART RATE: 79 BPM | WEIGHT: 200 LBS | RESPIRATION RATE: 20 BRPM | DIASTOLIC BLOOD PRESSURE: 77 MMHG | SYSTOLIC BLOOD PRESSURE: 157 MMHG | TEMPERATURE: 97.4 F

## 2023-05-27 DIAGNOSIS — R07.9 CHEST PAIN, UNSPECIFIED TYPE: ICD-10-CM

## 2023-05-27 LAB
ANION GAP SERPL CALCULATED.3IONS-SCNC: 11 MMOL/L (ref 5–18)
ATRIAL RATE - MUSE: 95 BPM
BASOPHILS # BLD AUTO: 0 10E3/UL (ref 0–0.2)
BASOPHILS NFR BLD AUTO: 1 %
BNP SERPL-MCNC: 58 PG/ML (ref 0–72)
BUN SERPL-MCNC: 12 MG/DL (ref 8–28)
CALCIUM SERPL-MCNC: 9.5 MG/DL (ref 8.5–10.5)
CHLORIDE BLD-SCNC: 108 MMOL/L (ref 98–107)
CO2 SERPL-SCNC: 25 MMOL/L (ref 22–31)
CREAT SERPL-MCNC: 0.73 MG/DL (ref 0.7–1.3)
DIASTOLIC BLOOD PRESSURE - MUSE: NORMAL MMHG
EOSINOPHIL # BLD AUTO: 0.1 10E3/UL (ref 0–0.7)
EOSINOPHIL NFR BLD AUTO: 3 %
ERYTHROCYTE [DISTWIDTH] IN BLOOD BY AUTOMATED COUNT: 12.8 % (ref 10–15)
GFR SERPL CREATININE-BSD FRML MDRD: >90 ML/MIN/1.73M2
GLUCOSE BLD-MCNC: 110 MG/DL (ref 70–125)
HCT VFR BLD AUTO: 41.4 % (ref 40–53)
HGB BLD-MCNC: 14.8 G/DL (ref 13.3–17.7)
HOLD SPECIMEN: NORMAL
HOLD SPECIMEN: NORMAL
IMM GRANULOCYTES # BLD: 0 10E3/UL
IMM GRANULOCYTES NFR BLD: 0 %
INTERPRETATION ECG - MUSE: NORMAL
LYMPHOCYTES # BLD AUTO: 0.7 10E3/UL (ref 0.8–5.3)
LYMPHOCYTES NFR BLD AUTO: 15 %
MCH RBC QN AUTO: 33.7 PG (ref 26.5–33)
MCHC RBC AUTO-ENTMCNC: 35.7 G/DL (ref 31.5–36.5)
MCV RBC AUTO: 94 FL (ref 78–100)
MONOCYTES # BLD AUTO: 0.5 10E3/UL (ref 0–1.3)
MONOCYTES NFR BLD AUTO: 11 %
NEUTROPHILS # BLD AUTO: 3.4 10E3/UL (ref 1.6–8.3)
NEUTROPHILS NFR BLD AUTO: 70 %
NRBC # BLD AUTO: 0 10E3/UL
NRBC BLD AUTO-RTO: 0 /100
P AXIS - MUSE: 62 DEGREES
PLATELET # BLD AUTO: 178 10E3/UL (ref 150–450)
POTASSIUM BLD-SCNC: 3.4 MMOL/L (ref 3.5–5)
PR INTERVAL - MUSE: 184 MS
QRS DURATION - MUSE: 98 MS
QT - MUSE: 368 MS
QTC - MUSE: 462 MS
R AXIS - MUSE: -19 DEGREES
RBC # BLD AUTO: 4.39 10E6/UL (ref 4.4–5.9)
SODIUM SERPL-SCNC: 144 MMOL/L (ref 136–145)
SYSTOLIC BLOOD PRESSURE - MUSE: NORMAL MMHG
T AXIS - MUSE: 47 DEGREES
TROPONIN I SERPL-MCNC: 0.02 NG/ML (ref 0–0.29)
VENTRICULAR RATE- MUSE: 95 BPM
WBC # BLD AUTO: 4.8 10E3/UL (ref 4–11)

## 2023-05-27 PROCEDURE — 36415 COLL VENOUS BLD VENIPUNCTURE: CPT | Performed by: EMERGENCY MEDICINE

## 2023-05-27 PROCEDURE — 82310 ASSAY OF CALCIUM: CPT | Performed by: EMERGENCY MEDICINE

## 2023-05-27 PROCEDURE — 99285 EMERGENCY DEPT VISIT HI MDM: CPT | Mod: 25

## 2023-05-27 PROCEDURE — 85025 COMPLETE CBC W/AUTO DIFF WBC: CPT | Performed by: EMERGENCY MEDICINE

## 2023-05-27 PROCEDURE — 93005 ELECTROCARDIOGRAM TRACING: CPT | Performed by: EMERGENCY MEDICINE

## 2023-05-27 PROCEDURE — 71045 X-RAY EXAM CHEST 1 VIEW: CPT

## 2023-05-27 PROCEDURE — 83880 ASSAY OF NATRIURETIC PEPTIDE: CPT | Performed by: EMERGENCY MEDICINE

## 2023-05-27 PROCEDURE — 84484 ASSAY OF TROPONIN QUANT: CPT | Performed by: EMERGENCY MEDICINE

## 2023-05-27 RX ORDER — ABIRATERONE ACETATE 250 MG/1
TABLET ORAL
COMMUNITY
Start: 2023-05-12 | End: 2023-10-16

## 2023-05-27 ASSESSMENT — ENCOUNTER SYMPTOMS
BACK PAIN: 0
SHORTNESS OF BREATH: 0
NECK PAIN: 1
LIGHT-HEADEDNESS: 1
NAUSEA: 0

## 2023-05-27 ASSESSMENT — ACTIVITIES OF DAILY LIVING (ADL)
ADLS_ACUITY_SCORE: 35
ADLS_ACUITY_SCORE: 35

## 2023-05-28 LAB — TROPONIN I SERPL-MCNC: 0.03 NG/ML (ref 0–0.29)

## 2023-05-28 NOTE — ED TRIAGE NOTES
Pt presents to ED with c/o chest pressure and light headedness x2 hours.  Feeling weak.  Currently on oral medication for prostate cancer and hormone injections every 6 months.  Took 324 mg ASA PTA.     Triage Assessment     Row Name 05/27/23 2055       Triage Assessment (Adult)    Airway WDL WDL       Respiratory WDL    Respiratory WDL WDL       Skin Circulation/Temperature WDL    Skin Circulation/Temperature WDL WDL       Cardiac WDL    Cardiac WDL X;chest pain       Chest Pain Assessment    Chest Pain Location midsternal    Character pressure       Peripheral/Neurovascular WDL    Peripheral Neurovascular WDL WDL       Cognitive/Neuro/Behavioral WDL    Cognitive/Neuro/Behavioral WDL WDL

## 2023-05-28 NOTE — DISCHARGE INSTRUCTIONS
As we discussed I do think it would be appropriate for you would to be admitted to be seen by cardiology however, this would be a difficult for you at this time.  I do want you to come back to the emergency department immediately if you develop any further symptoms or have any episodes of chest discomfort.  You should call first thing on Tuesday morning to get a hold of rapid access to be get seen by the rapid access cardiac clinic.  Please return for any concerns.

## 2023-05-28 NOTE — ED PROVIDER NOTES
EMERGENCY DEPARTMENT ENCOUNTER      NAME: Sage Jones  AGE: 73 year old male  YOB: 1950  MRN: [id number]  EVALUATION DATE & TIME: 5/27/2023  9:00 PM    PCP: William Meng    ED PROVIDER: Jayla Pressley M.D.      Chief Complaint   Patient presents with     Chest Pain     Dizziness         FINAL IMPRESSION:  1. Chest pain, unspecified type        MEDICAL DECISION MAKING:    Pertinent Labs & Imaging studies reviewed. (See chart for details)  ED Course as of 05/28/23 0031   Sat May 27, 2023   2106 I met with the patient, obtained history, performed an initial exam, and discussed options and plan for diagnostics and treatment here in the ED.   2113 Afebrile.  Vital signs here with hypertension, otherwise unremarkable.  Patient is coming in for evaluation of chest tightness, lightheadedness, mild left-sided neck tenderness.  Started 3 hours prior to coming in.  When it happened initially he took an aspirin and went and laid down.  Currently patient is symptom-free.  Symptoms started while he was at rest.  Currently being treated for prostate cancer, otherwise does have a history of hypertension, hyperlipidemia.  Has never had cardiac issues in the past.  Lifelong non-smoker.  Occasional alcohol.  Heart score of 4.    Exam for patient here unremarkable with exception of bilateral 1+ nonpitting edema to the lower extremities, chronic per patient.    Initiate cardiac work-up.   2116 Patient's initial EKG here shows sinus rhythm with occasional PVCs.  Normal axis.  No ST elevations.  Slight ST depression noted in V4, V5, V6.  No previous EKGs for comparison.  QTc is 462, QRS 98, .   2253 Patient's initial work-up here is unremarkable.  I did speak with patient regarding the mild abnormality seen on his EKG.  I offered admission to patient but he is declined at this time.  He states he understands he did have some abnormal findings on his EKG and that he does have some increased risk factors for  cardiac disease.  States that he has not had any more pain since he has been here and is amenable with getting his second troponin.  Again I spoke with him and told him that it would be best if he was admission however patient expresses understanding, shared decision making, he states that he will certainly return if he has any further issues or he has any further episodes of chest pain.  Is amenable to wait for second troponin.       Second troponin here 0.03.  Still technically within the negative range.  Again offered admission, patient would like discharge.  Given very strict return precautions.  Instructed to follow-up with rapid access clinic.  Return for any returning episode of chest discomfort or any symptoms of concern.  Patient and wife are both in agreement with plan.      Medical Decision Making    History:    Supplemental history from: Documented in chart, if applicable and Family Member/Significant Other    External Record(s) reviewed: Documented in chart, if applicable.    Work Up:    Chart documentation includes differential considered and any EKGs or imaging independently interpreted by provider, where specified.    In additional to work up documented, I considered the following work up: Documented in chart, if applicable.    External consultation:    Discussion of management with another provider: Documented in chart, if applicable    Complicating factors:    Care impacted by chronic illness: Hypertension    Care affected by social determinants of health: Other: Orthodox    Disposition considerations: Discharge. No recommendations on prescription strength medication(s). See documentation for any additional details.          Critical care: 0 minutes excluding separately billable procedures.  Includes bedside management, time reviewing test results, review of records, discussing the case with staff, documenting the medical record and time spent with family members (or surrogate decision  makers) discussing specific treatment issues.          ED COURSE:    The importance of close follow up was discussed. We reviewed warning signs and symptoms, and I instructed Mr. Jones to return to the emergency department immediately if he develops any new or worsening symptoms. I provided additional verbal discharge instructions. Mr. Jones expressed understanding and agreement with this plan of care, his questions were answered, and he was discharged in stable condition.     MEDICATIONS GIVEN IN THE EMERGENCY:  Medications - No data to display    NEW PRESCRIPTIONS STARTED AT TODAY'S ER VISIT:  New Prescriptions    No medications on file          =================================================================    HPI    Patient information was obtained from: Patient     Use of : N/A       Sage Jones is a 73 year old male who presents with chest pain and light-headedness onset while sitting 3 hours ago. Patient also reports feeling generally weak and mild sensation in the left side of his neck. Denies nausea, back pain, or shortness of breath. Patient notes taking a full Aspirin for his symptoms. Presently, patient still feels weak but back to his normal self. Denies sick exposures. Patient is currently undergoing chemotherapy for prostate cancer. He has previously had 27 rounds of radiation performed at [facility]. Denies cardiac history or known cardiac workup. Patient has history of hypertension and chronic leg swelling. Patient denies any additional complaints at this time.     REVIEW OF SYSTEMS   Review of Systems   Respiratory: Negative for shortness of breath.    Cardiovascular: Positive for chest pain.   Gastrointestinal: Negative for nausea.   Musculoskeletal: Positive for neck pain. Negative for back pain.   Neurological: Positive for light-headedness.   All other systems reviewed and are negative.        PAST MEDICAL HISTORY:  Past Medical History:   Diagnosis Date     Mumps   "      PAST SURGICAL HISTORY:  History reviewed. No pertinent surgical history.    CURRENT MEDICATIONS:    No current facility-administered medications for this encounter.    Current Outpatient Medications:      abiraterone (ZYTIGA) 250 MG tablet, Take 4 tablets (1,000 mg total) by mouth daily. Take on an empty stomach (1 hour before or 2 hours after food).*, Disp: , Rfl:      amLODIPine (NORVASC) 5 MG tablet, Take 1 tablet by mouth once daily, Disp: 90 tablet, Rfl: 3     ascorbic acid, vitamin C, (VITAMIN C) 1000 MG tablet, [ASCORBIC ACID, VITAMIN C, (VITAMIN C) 1000 MG TABLET] Take 1,000 mg by mouth daily., Disp: , Rfl:      b complex vitamins tablet, [B COMPLEX VITAMINS TABLET] Take 1 tablet by mouth daily., Disp: , Rfl:      betamethasone dipropionate (DIPROLENE) 0.05 % ointment, [BETAMETHASONE DIPROPIONATE (DIPROLENE) 0.05 % OINTMENT] Apply topically., Disp: , Rfl:      diphenhydrAMINE (BENADRYL) 25 mg capsule, [DIPHENHYDRAMINE (BENADRYL) 25 MG CAPSULE] Take 25 mg by mouth every 6 (six) hours as needed for itching., Disp: , Rfl:      loratadine (CLARITIN) 10 mg tablet, [LORATADINE (CLARITIN) 10 MG TABLET] Take 10 mg by mouth daily., Disp: , Rfl:      tamsulosin (FLOMAX) 0.4 MG capsule, Take 2 capsules (0.8 mg) by mouth daily, Disp: 180 capsule, Rfl: 3     tamsulosin (FLOMAX) 0.4 MG capsule, Take 1 capsule by mouth once daily, Disp: 90 capsule, Rfl: 3    ALLERGIES:  No Known Allergies    FAMILY HISTORY:  History reviewed. No pertinent family history.    SOCIAL HISTORY:   Social History     Socioeconomic History     Marital status:    Tobacco Use     Smoking status: Never     Smokeless tobacco: Never       PHYSICAL EXAM:    Vitals: BP (!) 157/77   Pulse 79   Temp 97.4  F (36.3  C) (Temporal)   Resp 20   Ht 1.753 m (5' 9\")   Wt 90.7 kg (200 lb)   SpO2 100%   BMI 29.53 kg/m     General:. Alert and interactive, comfortable appearing.  HENT: Oropharynx without erythema or exudates. MMM.  TMs clear " bilaterally.  Eyes: Pupils mid-sized and equally reactive.   Neck: Full AROM.  No midline tenderness to palpation.  Cardiovascular: Regular rate and rhythm. Peripheral pulses 2+ bilaterally.  Chest/Pulmonary: Normal work of breathing. Lung sounds clear and equal throughout, no wheezes or crackles. No chest wall tenderness or deformities.  Abdomen: Soft, nondistended. Nontender without guarding or rebound.  Back/Spine: No CVA or midline tenderness.  Extremities: Normal ROM of all major joints. Bilateral 1+ non-pitting edema to the lower extremities.  Skin: Warm and dry. Normal skin color.   Neuro: Speech clear. CNs grossly intact. Moves all extremities appropriately. Strength and sensation grossly intact to all extremities.   Psych: Normal affect/mood, cooperative, memory appropriate.     LAB:  All pertinent labs reviewed and interpreted.  Labs Ordered and Resulted from Time of ED Arrival to Time of ED Departure   BASIC METABOLIC PANEL - Abnormal       Result Value    Sodium 144      Potassium 3.4 (*)     Chloride 108 (*)     Carbon Dioxide (CO2) 25      Anion Gap 11      Urea Nitrogen 12      Creatinine 0.73      Calcium 9.5      Glucose 110      GFR Estimate >90     CBC WITH PLATELETS AND DIFFERENTIAL - Abnormal    WBC Count 4.8      RBC Count 4.39 (*)     Hemoglobin 14.8      Hematocrit 41.4      MCV 94      MCH 33.7 (*)     MCHC 35.7      RDW 12.8      Platelet Count 178      % Neutrophils 70      % Lymphocytes 15      % Monocytes 11      % Eosinophils 3      % Basophils 1      % Immature Granulocytes 0      NRBCs per 100 WBC 0      Absolute Neutrophils 3.4      Absolute Lymphocytes 0.7 (*)     Absolute Monocytes 0.5      Absolute Eosinophils 0.1      Absolute Basophils 0.0      Absolute Immature Granulocytes 0.0      Absolute NRBCs 0.0     TROPONIN I - Normal    Troponin I 0.02     B-TYPE NATRIURETIC PEPTIDE ([facility] ONLY) - Normal    BNP 58     TROPONIN I - Normal    Troponin I 0.03         RADIOLOGY:  XR Chest  Port 1 View   Final Result   IMPRESSION: Negative chest.          EKG:  See MDM  I have independently reviewed and interpreted the EKG(s) documented above.           I, Aleks Fraser, am serving as a scribe to document services personally performed by Dr. Jayla Pressley  based on my observation and the provider's statements to me. I, Jayla Pressley MD attest that Aleks Fraser is acting in a scribe capacity, has observed my performance of the services and has documented them in accordance with my direction.      Jayla Pressley M.D.  Emergency Medicine  [facility] EMERGENCY ROOM  [address] [facility] [address]  [phone]  Dept: [phone]     Jayla Pressley MD  05/28/23 0031

## 2023-07-20 ENCOUNTER — TELEPHONE (OUTPATIENT)
Dept: FAMILY MEDICINE | Facility: CLINIC | Age: 73
End: 2023-07-20
Payer: MEDICARE

## 2023-07-20 NOTE — TELEPHONE ENCOUNTER
Please call patient and try to arrange for follow-up visit from recent emergency department visit. Please inquire if patient would like me to place a referral for a oncologist here in [place]. Please inform him that I have spoken with his oncologist at [facility].

## 2023-07-20 NOTE — TELEPHONE ENCOUNTER
Dr Camacho is calling wanting to update Dr Meng about this patient.    Dr Camacho is requesting a quick call back from Dr Meng. He is wondering if Dr Meng can call him on his personal cell phone at .     He would like to chat about a recent ED visit and also is trying to get Rich an oncology doctor in the [facility] area.

## 2023-07-21 NOTE — TELEPHONE ENCOUNTER
Called and left detailed message.    It would be ideal to have him schedule a followup with Dr Meng.    If he calls back please let me know and I will help schedule something soon.    Thank you

## 2023-07-24 PROBLEM — M85.80 OSTEOPENIA: Status: ACTIVE | Noted: 2022-12-22

## 2023-07-24 PROBLEM — E29.1 HYPOGONADISM IN MALE: Status: ACTIVE | Noted: 2022-12-22

## 2023-07-24 PROBLEM — C61 PRIMARY MALIGNANT NEOPLASM OF PROSTATE (H): Status: ACTIVE | Noted: 2022-09-21

## 2023-07-25 ENCOUNTER — OFFICE VISIT (OUTPATIENT)
Dept: FAMILY MEDICINE | Facility: CLINIC | Age: 73
End: 2023-07-25
Payer: MEDICARE

## 2023-07-25 ENCOUNTER — PATIENT OUTREACH (OUTPATIENT)
Dept: ONCOLOGY | Facility: CLINIC | Age: 73
End: 2023-07-25

## 2023-07-25 VITALS
DIASTOLIC BLOOD PRESSURE: 76 MMHG | HEIGHT: 69 IN | SYSTOLIC BLOOD PRESSURE: 126 MMHG | TEMPERATURE: 98.6 F | WEIGHT: 211 LBS | OXYGEN SATURATION: 99 % | BODY MASS INDEX: 31.25 KG/M2 | HEART RATE: 70 BPM | RESPIRATION RATE: 18 BRPM

## 2023-07-25 DIAGNOSIS — R07.89 CHEST TIGHTNESS: ICD-10-CM

## 2023-07-25 DIAGNOSIS — C61 PROSTATE CANCER (H): Primary | ICD-10-CM

## 2023-07-25 PROCEDURE — 99214 OFFICE O/P EST MOD 30 MIN: CPT | Performed by: FAMILY MEDICINE

## 2023-07-25 ASSESSMENT — PAIN SCALES - GENERAL: PAINLEVEL: NO PAIN (0)

## 2023-07-25 NOTE — PROGRESS NOTES
"Sage Jones  /76   Pulse 70   Temp 98.6  F (37  C)   Resp 18   Ht 1.753 m (5' 9\")   Wt 95.7 kg (211 lb)   SpO2 99%   BMI 31.16 kg/m       Assessment/Plan:                Sage was seen today for recheck medication.    Diagnoses and all orders for this visit:    Prostate cancer (H)  -     Adult Oncology/Hematology  Referral; Future    Chest tightness         Return to see an oncologist within the [facility] system. See discussion below regarding episode of chest tightness. No further action at this time continue to monitor. Patient will return for a annual wellness visit in August.  Subjective:     HPI:    Sage Jones is a 73 year old male is here today with his wife to discuss ongoing management of his prostate cancer and to review a emergency department visit from May 2023.    His medical history includes hypertension for which she takes amlodipine. My last encounter with him prior to today was July 2021.    In the summer of 2022 he developed acute urinary obstruction was found to have prostate cancer. He sought evaluation and treatment at the [facility]. He underwent radiation therapy and is currently on chemotherapy. His PSA test most recently was undetectable. I had spoken with his oncologist recently who suggested we find a oncologist within the [facility] area for him to continue his care given his stability at this point. Patient agrees to this and we will make a referral reviewed his clinical course in regard to his prostate cancer briefly today.    In May 2023 patient presented to the hospital with concerns of chest tightness. This was a single episode. Patient did not think much of it and did not follow-up specifically on this visit. His evaluation was negative from the standpoint he had negative troponins and no indication of any serious life-threatening cause. He was subtle EKG abnormality with ST segment abnormalities that were nonspecific. Patient is no " history of vascular disease. He has had no further instances of similar symptoms since this event occurred in May now two months ago. Patient denies any shortness of breath with activity. He has however become more sedentary having more difficulty walking. This is due to his cancer. He does not get any further episodes of chest tightness. He does not get dizziness lightheadedness or other concerning symptoms to suggest underlying cardiovascular disease. He is presented with options today to consider a stress test and further evaluation versus to pursue a more conservative approach. He likes for a more conservative approach and feels reassured by the absence of any symptoms. He agrees should he develop any concerning symptoms which review today that we would seek further evaluation.  ROS:  Complete review of systems is obtained.  Other than the specific considerations noted above complete review of systems is negative.          Objective:   Medications:  Current Outpatient Medications   Medication    abiraterone (ZYTIGA) 250 MG tablet    amLODIPine (NORVASC) 5 MG tablet    ascorbic acid, vitamin C, (VITAMIN C) 1000 MG tablet    b complex vitamins tablet    betamethasone dipropionate (DIPROLENE) 0.05 % ointment    diphenhydrAMINE (BENADRYL) 25 mg capsule    LEUPROLIDE ACETATE IM    loratadine (CLARITIN) 10 mg tablet    tamsulosin (FLOMAX) 0.4 MG capsule    tamsulosin (FLOMAX) 0.4 MG capsule     No current facility-administered medications for this visit.        Allergies:   No Known Allergies     Social History     Socioeconomic History    Marital status:      Spouse name: Not on file    Number of children: Not on file    Years of education: Not on file    Highest education level: Not on file   Occupational History    Not on file   Tobacco Use    Smoking status: Never    Smokeless tobacco: Never   Vaping Use    Vaping Use: Never used   Substance and Sexual Activity    Alcohol use: Not on file    Drug use: Not  "on file    Sexual activity: Not on file   Other Topics Concern    Not on file   Social History Narrative    Not on file     Social Determinants of Health     Financial Resource Strain: Not on file   Food Insecurity: Not on file   Transportation Needs: Not on file   Physical Activity: Not on file   Stress: Not on file   Social Connections: Not on file   Intimate Partner Violence: Not on file   Housing Stability: Not on file       No family history on file.     Most Recent Immunizations   Administered Date(s) Administered    COVID-19 Bivalent 12+ (Pfizer) 04/24/2023    COVID-19 MONOVALENT 12+ (Pfizer) 11/04/2021    COVID-19 Monovalent 12+ (Pfizer 2022) 04/08/2022    Influenza Vaccine 65+ (Fluzone HD) 09/26/2022    Pneumo Conj 13-V (2010&after) 07/30/2021    TDAP (Adacel,Boostrix) 07/30/2021        Wt Readings from Last 3 Encounters:   07/25/23 95.7 kg (211 lb)   05/27/23 90.7 kg (200 lb)   07/29/22 90.7 kg (200 lb)        BP Readings from Last 6 Encounters:   07/25/23 126/76   05/27/23 (!) 157/77   07/26/22 132/78   06/29/22 132/86   06/17/22 (!) 140/86   06/10/22 (!) 146/82          PHYSICAL EXAM:    /76   Pulse 70   Temp 98.6  F (37  C)   Resp 18   Ht 1.753 m (5' 9\")   Wt 95.7 kg (211 lb)   SpO2 99%   BMI 31.16 kg/m           General Appearance:    Alert, cooperative, no distress   Eyes:   No scleral icterus or conjunctival irritation       Lungs:     Clear to auscultation bilaterally, respirations unlabored, no wheezes or crackles   Heart:    Regular rate and rhythm,  No murmur   Extremities:  No edema, no joint swelling or redness, no evidence of any injuries   Skin:  No concerning skin findings, no suspicious moles, no rashes   Neurologic:  On gross examination there is no motor or sensory deficit.  Patient walks with a normal gait                                     "

## 2023-07-25 NOTE — PROGRESS NOTES
New Patient Oncology Nurse Navigator Note     Referring provider:   William Meng MD  [facility]     Referred to (specialty): Medical Oncology    Date Referral Received:   07/25/23     Evaluation for :   Ongoing management of prostate cancer treatment, Transfer of care - treatment started at [facility]     Clinical History (per Nurse review of records provided):    Per referring notes:  In the summer of 2022 he developed acute urinary obstruction was found to have prostate cancer. He sought evaluation and treatment at the [facility]. He underwent radiation therapy and is currently on ADT. His PSA test most recently was undetectable.     Oncologist (Jose A Camacho M.D. ) who suggested we find a oncologist within the [facility] area for him to continue his care given his stability at this point.      Records Location ([facility] Everywhere, Media, etc.):   [facility] Everywhere     I called patient in attempt to discuss referral to medical oncology and reached his voicemail.  I left new patient scheduling phone number to call to schedule, as well as mine if he has questions.  I have the following tentative plan:    SCHEDULE: HOLD Dr. Bright, MELISSA, 8/21, 11-12, NEW, in person  If date does not work for patient schedule with any med onc for prostate cancer, 60 min, NEW @ pt choice of location

## 2023-07-31 NOTE — TELEPHONE ENCOUNTER
RECORDS STATUS - ALL OTHER DIAGNOSIS    Prostate cancer   RECORDS RECEIVED FROM: [facility]    Appt Date: 8/21/2023 with Dr. Bright     Action    Action Taken 7/31/2023 4:12pm AYANNA BENITEZ faxed over a request for imaging to [facility]     8/2/2023 4:27pm AYANNA BENITEZ resolved imaging from [facility] in PACS      NOTES STATUS DETAILS   OFFICE NOTE from referring provider Complete [facility]      OFFICE NOTE from medical oncologist     DISCHARGE SUMMARY from hospital     DISCHARGE REPORT from the ER     OPERATIVE REPORT     MEDICATION LIST Complete [facility]   CLINICAL TRIAL TREATMENTS TO DATE     LABS     PATHOLOGY REPORTS See internal biopsy in [facility] 7/28/2022  A.  Prostate, left lateral base: Biopsy:  - Prostatic acinar adenocarcinoma, [place] score 4 + 5 = 9, grade group 5, involving 1 of 1 core(s), measuring 14 mm in greatest linear dimension, representing 80% of total core volume      B.  Prostate, left lateral mid: Biopsy:  - Prostatic acinar adenocarcinoma, [place] score 4 + 5 = 9, grade group 5, involving 1 of 1 core(s), measuring 4 mm in greatest linear dimension, representing 50% of total core volume      C.  Prostate, left lateral apex: Biopsy:  - Prostatic acinar adenocarcinoma, [place] score 4 + 4 = 8, grade group 4, involving 1 of 1 core(s), measuring 15 mm in greatest linear dimension, representing nearly 100% of total core volume      D.  Prostate, left base: Biopsy:  - Prostatic acinar adenocarcinoma, Macarena score 4 + 3 = 7, grade group 3, 70% pattern 4, involving 1 of 1 core(s), measuring 14 mm in greatest linear dimension, representing 90% of total core volume      E.  Prostate, left mid: Biopsy:  - Prostatic acinar adenocarcinoma, Macarena score 4 + 5 = 9, grade group 5, involving 1 of 1 core(s), measuring 15 mm in greatest linear dimension, representing 80% of total core volume      F.  Prostate, left apex: Biopsy:  - Prostatic acinar adenocarcinoma, Macarena score 4 + 5 = 9, grade group 5, involving 1 of 1 core(s), measuring 4 mm in  greatest linear dimension, representing 50% of total core volume      G.  Prostate, right lateral base: Biopsy:  - Prostatic acinar adenocarcinoma, Macarena score 4 + 3 = 7, grade group 3, 60% pattern 4, involving 1 of 1 core(s), measuring 15 mm in greatest linear dimension, representing 90% of total core volume  - Extraprostatic extension present     H.  Prostate, right lateral mid: Biopsy:  - Prostatic acinar adenocarcinoma, Macarena score 4 + 5 = 9, grade group 5, involving 1 of 1 core(s), measuring 16 mm in greatest linear dimension, representing 90% of total core volume   - Perineural invasion present       I.  Prostate, right lateral apex: Biopsy:  - Prostatic acinar adenocarcinoma, Macarena score 4 + 3 = 7, grade group 3, 60% pattern 4, involving 1 of 1 core(s), measuring 8 mm in greatest linear dimension, representing 70% of total core volume       J.  Prostate, right base: Biopsy:  - Prostatic acinar adenocarcinoma, [place] score 3 + 4 = 7, grade group 2, 30% pattern 4, involving 1 of 1 core(s), measuring 17 mm in greatest linear dimension, representing 80% of total core volume  - Perineural invasion present         K.  Prostate, right mid: Biopsy:  - Prostatic acinar adenocarcinoma, Macarena score 4 + 5 = 7, grade group 5, involving 1 of 1 core(s), measuring 18 mm in greatest linear dimension, representing 90% of total core volume       L.  Prostate, right apex: Biopsy:  - Prostatic acinar adenocarcinoma, [place] score 4 + 5 = 9, grade group 5, involving 1 of 1 core(s), measuring 13 mm in greatest linear dimension, representing 90% of total core volume              ANYTHING RELATED TO DIAGNOSIS Complete Labs last update done on 7/20/2023 in [facility]   GENONOMIC TESTING     TYPE:     IMAGING (NEED IMAGES & REPORT)     CT SCANS     MRI Complete - [place]  MRI prostate 11/14/2022   Xray Chest Complete - [place] 5/27/2023    MAMMO     ULTRASOUND Complete- [place] US Urinary Bladder 11/3/2022    US Prostate 11/3/2022   PET

## 2023-08-21 ENCOUNTER — ONCOLOGY VISIT (OUTPATIENT)
Dept: ONCOLOGY | Facility: CLINIC | Age: 73
End: 2023-08-21
Attending: FAMILY MEDICINE
Payer: MEDICARE

## 2023-08-21 ENCOUNTER — PRE VISIT (OUTPATIENT)
Dept: ONCOLOGY | Facility: HOSPITAL | Age: 73
End: 2023-08-21
Payer: MEDICARE

## 2023-08-21 VITALS
SYSTOLIC BLOOD PRESSURE: 147 MMHG | RESPIRATION RATE: 18 BRPM | OXYGEN SATURATION: 99 % | BODY MASS INDEX: 31.7 KG/M2 | HEIGHT: 69 IN | WEIGHT: 214 LBS | HEART RATE: 68 BPM | DIASTOLIC BLOOD PRESSURE: 72 MMHG

## 2023-08-21 DIAGNOSIS — C61 PRIMARY MALIGNANT NEOPLASM OF PROSTATE (H): Primary | ICD-10-CM

## 2023-08-21 DIAGNOSIS — C61 PROSTATE CANCER (H): ICD-10-CM

## 2023-08-21 PROCEDURE — G0463 HOSPITAL OUTPT CLINIC VISIT: HCPCS | Performed by: INTERNAL MEDICINE

## 2023-08-21 PROCEDURE — 99204 OFFICE O/P NEW MOD 45 MIN: CPT | Performed by: INTERNAL MEDICINE

## 2023-08-21 ASSESSMENT — ENCOUNTER SYMPTOMS
WEAKNESS: 1
JOINT SWELLING: 0
NERVOUS/ANXIOUS: 0
FEVER: 0
PARESTHESIAS: 0
HEMATURIA: 0
SHORTNESS OF BREATH: 0
MYALGIAS: 1
CHILLS: 0
HEADACHES: 0
HEMATOCHEZIA: 0
ABDOMINAL PAIN: 0
DIARRHEA: 0
COUGH: 0
EYE PAIN: 0
HEARTBURN: 0
FREQUENCY: 1
NAUSEA: 0
DIZZINESS: 0
PALPITATIONS: 0
ARTHRALGIAS: 1
DYSURIA: 0
CONSTIPATION: 0
SORE THROAT: 0

## 2023-08-21 ASSESSMENT — PAIN SCALES - GENERAL: PAINLEVEL: NO PAIN (0)

## 2023-08-21 ASSESSMENT — ACTIVITIES OF DAILY LIVING (ADL): CURRENT_FUNCTION: NO ASSISTANCE NEEDED

## 2023-08-21 NOTE — LETTER
"    8/21/2023         RE: Sage Jones  [address]  [place] [address]        Dear Colleague,    Thank you for referring your patient, Sage Jones, to the [facility]. Please see a copy of my visit note below.    Oncology Rooming Note    August 21, 2023 11:08 AM   Sage Jones is a 73 year old male who presents for:    Chief Complaint   Patient presents with     Oncology Clinic Visit       Primary malignant neoplasm of prostate        Initial Vitals: BP (!) 147/72   Pulse 68   Resp 18   Ht 1.753 m (5' 9\")   Wt 97.1 kg (214 lb)   SpO2 99%   BMI 31.60 kg/m   Estimated body mass index is 31.6 kg/m  as calculated from the following:    Height as of this encounter: 1.753 m (5' 9\").    Weight as of this encounter: 97.1 kg (214 lb). Body surface area is 2.17 meters squared.  No Pain (0) Comment: Data Unavailable   No LMP for male patient.  Allergies reviewed: Yes  Medications reviewed: Yes    Medications: Medication refills not needed today.  Pharmacy name entered into [employer]:    [facility] [address] [address] [facility] [address] [id number] [place], [place] - [address]    Clinical concerns:  new consult transfer of care prostate       Erika Mae              [facility] Hematology and Oncology Consult Note      Patient: Sage Jones  MRN: [id number]  Date of Service: Aug 21, 2023      We have been asked by Dr. Camacho to evaluate Sage Jones for prostate cancer    Assessment/Plan:    1.  Prostate cancer: He appears to have disease confined to the prostate at diagnosis.  He is receiving Lupron every 6 months as well as abiraterone and prednisone.  His next Lupron will be due October 20, 2023.  We will see him every 3 months with a PSA and labs.  Would anticipate 2 years of treatment.  Guidelines suggest anywhere from 18 to 36 months of ADT. JOSEPH trial used abiraterone and prednisone for 24 months.  We can " see him back in clinic in October when he comes in for his next Lupron injection.  His labs from July 20 were reviewed.  His PSA was less than 0.1.  Alkaline phosphatase was normal at 122.  Questions were answered.    Medical decision Making:  Today's visit was centered around a cancer diagnosis in the setting of additional medical comorbidities. Complex medical decision making was required. The risk of additional morbidity without further treatment is high.     ECOG Performance  1    Staging History:    Cancer Staging   No matching staging information was found for the patient.    History:    Rich is a 73-year-old male who is referred for evaluation of prostate cancer.  The patient has a history of prostate cancer initially diagnosed in June 2022.  He presented with obstructive symptoms.  At the time of diagnosis there was suspicion of marek metastases in the bilateral iliac nodes.  Initial MRI showed a PI-RADS 5 lesion measuring 4 x 5 x 6.3 cm.  There was extracapsular extension involving the rectal wall and external inner sphincter.  Also question of bilateral iliac marek mets with the largest measuring 7.6 mm.  No evidence of neurovascular or seminal vesicle involvement.  TURBT revealed a Macarena 4+5 with extraprostatic extension and perineural invasion.  He was staged as 3C (cT3a cN0 cM0) grade group 5.  A PSMA PET scan September 1, 2022 showed disease confined to the prostate.  PSA was 92.  Stage T3a.  He started Firmagon and then Lupron, abiraterone and prednisone in October 2022.  Radiation was started November 21, 2022.  The plan is for a 2-year course of androgen deprivation therapy.  He has been treated at the [facility] he had a visit there in July and it was recommended that he follow-up closer to home for convenience.  He has been tolerating his treatment okay.  No significant vasomotor symptoms.  Their plan was for abiraterone for 2 years and Lupron for 2 to 3 years.    Past History:    Past Medical  History:   Diagnosis Date     Mumps     No family history on file.   [unfilled] Social History     Socioeconomic History     Marital status:      Spouse name: Not on file     Number of children: Not on file     Years of education: Not on file     Highest education level: Not on file   Occupational History     Not on file   Tobacco Use     Smoking status: Never     Smokeless tobacco: Never   Vaping Use     Vaping Use: Never used   Substance and Sexual Activity     Alcohol use: Not on file     Drug use: Not on file     Sexual activity: Not on file   Other Topics Concern     Not on file   Social History Narrative     Not on file     Social Determinants of Health     Financial Resource Strain: Not on file   Food Insecurity: Not on file   Transportation Needs: Not on file   Physical Activity: Not on file   Stress: Not on file   Social Connections: Not on file   Interpersonal Safety: Not on file   Housing Stability: Not on file        Allergies:    No Known Allergies    Review of Systems:    As above in the history.     Review of Systems otherwise Negative for:  General: chills, fever or night sweats  Psychological: anxiety or depression  Ophthalmic: blurry vision, double vision or loss of vision, vision change  ENT: epistaxis, oral lesions, hearing changes  Hematological and Lymphatic: bleeding, bruising, jaundice, swollen lymph nodes  Endocrine: hot flashes, unexpected weight changes  Respiratory: cough, hemoptysis, orthopnea or shortness of breath/ORTIZ  Cardiovascular: chest pain, edema, palpitations or PND  Gastrointestinal: abdominal pain, blood in stools, change in bowel habits, constipation, diarrhea or nausea/vomiting  Genito-Urinary: change in urinary stream, incontinence, frequency/urgency  Musculoskeletal: joint pain, stiffness, swelling, muscle pain  Neurological: dizziness, headaches, numbness/tingling  Dermatological: lumps and rash    Physical Exam:    BP (!) 147/72   Pulse 68   Resp 18   Ht 1.753 m  "(5' 9\")   Wt 97.1 kg (214 lb)   SpO2 99%   BMI 31.60 kg/m      General: patient appears stated age of 73 year old. Nontoxic and in no distress.   HEENT: Head: atraumatic, normocephalic. Sclerae anicteric.  Chest:  Normal respiratory effort  Cardiac:  No edema.   Abdomen: abdomen is non-distended  Extremities: normal tone and muscle bulk.   Skin: no lesions or rash on visible skin. Warm and dry.   CNS: alert and oriented. Grossly non-focal.   Psychiatric: normal mood and affect.     Lab Results:    No results found for this or any previous visit (from the past 168 hour(s)).    Imaging Results:    No results found.      Signed by: Reed Bright MD      Again, thank you for allowing me to participate in the care of your patient.        Sincerely,        Reed Bright MD  "

## 2023-08-21 NOTE — PROGRESS NOTES
"Oncology Rooming Note    August 21, 2023 11:08 AM   Sage Jones is a 73 year old male who presents for:    Chief Complaint   Patient presents with    Oncology Clinic Visit       Primary malignant neoplasm of prostate        Initial Vitals: BP (!) 147/72   Pulse 68   Resp 18   Ht 1.753 m (5' 9\")   Wt 97.1 kg (214 lb)   SpO2 99%   BMI 31.60 kg/m   Estimated body mass index is 31.6 kg/m  as calculated from the following:    Height as of this encounter: 1.753 m (5' 9\").    Weight as of this encounter: 97.1 kg (214 lb). Body surface area is 2.17 meters squared.  No Pain (0) Comment: Data Unavailable   No LMP for male patient.  Allergies reviewed: Yes  Medications reviewed: Yes    Medications: Medication refills not needed today.  Pharmacy name entered into [facility]:    [facility] [address] [place], [place] - [address] [facility] [address] [place], [place] - [address]    Clinical concerns:  new consult transfer of care prostate       Erika Mae            "

## 2023-08-22 ENCOUNTER — OFFICE VISIT (OUTPATIENT)
Dept: FAMILY MEDICINE | Facility: CLINIC | Age: 73
End: 2023-08-22
Payer: MEDICARE

## 2023-08-22 ENCOUNTER — LAB (OUTPATIENT)
Dept: FAMILY MEDICINE | Facility: CLINIC | Age: 73
End: 2023-08-22

## 2023-08-22 VITALS
DIASTOLIC BLOOD PRESSURE: 82 MMHG | HEART RATE: 75 BPM | TEMPERATURE: 98.6 F | RESPIRATION RATE: 18 BRPM | SYSTOLIC BLOOD PRESSURE: 142 MMHG | OXYGEN SATURATION: 98 % | HEIGHT: 69 IN | WEIGHT: 213.7 LBS | BODY MASS INDEX: 31.65 KG/M2

## 2023-08-22 DIAGNOSIS — Z00.00 MEDICARE ANNUAL WELLNESS VISIT, SUBSEQUENT: Primary | ICD-10-CM

## 2023-08-22 DIAGNOSIS — Z12.11 SCREEN FOR COLON CANCER: ICD-10-CM

## 2023-08-22 DIAGNOSIS — C61 PROSTATE CANCER (H): ICD-10-CM

## 2023-08-22 DIAGNOSIS — I10 ESSENTIAL HYPERTENSION: ICD-10-CM

## 2023-08-22 PROCEDURE — 90677 PCV20 VACCINE IM: CPT | Performed by: FAMILY MEDICINE

## 2023-08-22 PROCEDURE — G0009 ADMIN PNEUMOCOCCAL VACCINE: HCPCS | Performed by: FAMILY MEDICINE

## 2023-08-22 PROCEDURE — G0439 PPPS, SUBSEQ VISIT: HCPCS | Performed by: FAMILY MEDICINE

## 2023-08-22 ASSESSMENT — ACTIVITIES OF DAILY LIVING (ADL)
DIFFICULTY_EATING/SWALLOWING: NO
TOILETING_ISSUES: NO
CHANGE_IN_FUNCTIONAL_STATUS_SINCE_ONSET_OF_CURRENT_ILLNESS/INJURY: NO
CONCENTRATING,_REMEMBERING_OR_MAKING_DECISIONS_DIFFICULTY: NO
DIFFICULTY_EATING/SWALLOWING: NO
WEAR_GLASSES_OR_BLIND: YES
WEAR_GLASSES_OR_BLIND: YES
DOING_ERRANDS_INDEPENDENTLY_DIFFICULTY: NO
WALKING_OR_CLIMBING_STAIRS_DIFFICULTY: NO
DOING_ERRANDS_INDEPENDENTLY_DIFFICULTY: NO
HEARING_DIFFICULTY_OR_DEAF: NO
DRESSING/BATHING_DIFFICULTY: NO
WALKING_OR_CLIMBING_STAIRS_DIFFICULTY: NO
CONCENTRATING,_REMEMBERING_OR_MAKING_DECISIONS_DIFFICULTY: NO
FALL_HISTORY_WITHIN_LAST_SIX_MONTHS: NO
FALL_HISTORY_WITHIN_LAST_SIX_MONTHS: NO
HEARING_DIFFICULTY_OR_DEAF: NO
DIFFICULTY_COMMUNICATING: NO
TOILETING_ISSUES: NO
DIFFICULTY_COMMUNICATING: NO
DRESSING/BATHING_DIFFICULTY: NO
CHANGE_IN_FUNCTIONAL_STATUS_SINCE_ONSET_OF_CURRENT_ILLNESS/INJURY: NO

## 2023-08-22 ASSESSMENT — PAIN SCALES - GENERAL: PAINLEVEL: NO PAIN (0)

## 2023-08-22 NOTE — PROGRESS NOTES
SUBJECTIVE:   Rich is a 73 year old who presents for Preventive Visit.        His medical history includes prostate cancer for which she is currently undergoing treatment. He had presented with initially urinary obstruction which led to his diagnosis, he was initially treated at the [facility] and is now transferred care to Dr. Bright whom he saw yesterday. Other medical history includes hypertension under reasonable control with amlodipine. Will continue to monitor closely.    Today we discussed routine health prevention. We reviewed immunizations. Would recommend he strongly consider COVID vaccine, influenza vaccine and RSV vaccine this fall which he plans to do. Will contemplate further the shingles vaccination and if he decides to get it will obtain this from a pharmacy. We will update his pneumonia vaccine with a pneumococcal 20 vaccination today.    We discussed colon cancer screening options. Elected to proceed with a colorguard test.    Review the importance of routine dental care and eye care. Discussed self skin exams. No additional significant health concerns identified through our visit today. See additional documentation.  Are you in the first 12 months of your Medicare coverage?  No    Have you ever done Advance Care Planning? (For example, a Health Directive, POLST, or a discussion with a medical provider or your loved ones about your wishes): Yes, patient states has an Advance Care Planning document and will bring a copy to the clinic.      Fallen 2 or more times in the past year?: No  Any fall with injury in the past year?: No    Cognitive Screening: Completed. PASS.  1) Repeat 3 items: 3/3  2) Clock draw: NORMAL  3) 3 item recall: Recalls 3 objects  Results: 3 items recalled: COGNITIVE IMPAIRMENT LESS LIKELY    Mini-CogTM Copyright LETICIA Jameson. Licensed by the author for use in [facility]; reprinted with permission (walter@.[email or web address]). All rights reserved.      Do you have sleep apnea,  "excessive snoring or daytime drowsiness? : no    Reviewed and updated as needed this visit by clinical staff    Reviewed and updated as needed this visit by Provider    Social History     Tobacco Use    Smoking status: Never    Smokeless tobacco: Never   Substance Use Topics    Alcohol use: Not on file             8/21/2023     9:03 AM   Alcohol Use   Prescreen: >3 drinks/day or >7 drinks/week? No          No data to display              Do you have a current opioid prescription? No  Do you use any other controlled substances or medications that are not prescribed by a provider? None      Current providers sharing in care for this patient include:     Patient Care Team:  William Meng MD as PCP - General  William Meng MD as Assigned PCP  Ford Villalobos MD as Assigned Surgical Provider  Reed Bright MD (Internal Medicine)    The following health maintenance items are reviewed in Epic and correct as of today:Answers submitted by the patient for this visit:  Annual Preventive Visit (Submitted on 8/21/2023)  Chief Complaint: Annual Exam:  In general, how would you rate your overall physical health?: good  Frequency of exercise:: 6-7 days/week  Do you usually eat at least 4 servings of fruit and vegetables a day, include whole grains & fiber, and avoid regularly eating high fat or \"junk\" foods? : Yes  Taking medications regularly:: Yes  Medication side effects:: Other  Activities of Daily Living: no assistance needed  Home safety: no safety concerns identified  Hearing Impairment:: no hearing concerns  In the past 6 months, have you been bothered by leaking of urine?: No  In general, how would you rate your overall mental or emotional health?: excellent  Additional concerns today:: Yes  Exercise outside of work (Submitted on 8/21/2023)  Chief Complaint: Annual Exam:  Duration of exercise:: 30-45 minutes    Health Maintenance   Topic Date Due    COLORECTAL CANCER SCREENING  Never done    ZOSTER " "IMMUNIZATION (1 of 2) Never done    ANNUAL REVIEW OF HM ORDERS  07/30/2022    INFLUENZA VACCINE (1) 09/01/2023    MEDICARE ANNUAL WELLNESS VISIT  08/22/2024    FALL RISK ASSESSMENT  08/22/2024    LIPID  06/27/2027    ADVANCE CARE PLANNING  08/22/2028    DTAP/TDAP/TD IMMUNIZATION (2 - Td or Tdap) 07/30/2031    HEPATITIS C SCREENING  Completed    PHQ-2 (once per calendar year)  Completed    Pneumococcal Vaccine: 65+ Years  Completed    AORTIC ANEURYSM SCREENING (SYSTEM ASSIGNED)  Completed    COVID-19 Vaccine  Completed    IPV IMMUNIZATION  Aged Out    MENINGITIS IMMUNIZATION  Aged Out     Review of Systems  Complete review of systems is obtained.  Other than the specific considerations noted above complete review of systems is negative.    OBJECTIVE:   BP (!) 142/82   Pulse 75   Temp 98.6  F (37  C)   Resp 18   Ht 1.753 m (5' 9\")   Wt 96.9 kg (213 lb 11.2 oz)   SpO2 98%   BMI 31.56 kg/m   Estimated body mass index is 31.56 kg/m  as calculated from the following:    Height as of this encounter: 1.753 m (5' 9\").    Weight as of this encounter: 96.9 kg (213 lb 11.2 oz).  Physical Exam    General Appearance:    Alert, cooperative, no distress   Eyes:   No scleral icterus or conjunctival irritation       Ears:    Normal TM's and external ear canals, both ears   Throat:   Lips, mucosa, and tongue normal; teeth and gums normal   Neck:   Supple, symmetrical, trachea midline, no adenopathy;        thyroid:  No enlargement/tenderness/nodules   Lungs:     Clear to auscultation bilaterally, respirations unlabored, no wheezes or crackles   Heart:    Regular rate and rhythm,  No murmur   Abdomen:    Soft, no distention, no tenderness on palpation, no masses, no organomegaly     Extremities:  No edema, no joint swelling or redness, no evidence of any injuries   Skin:  No concerning skin findings, no suspicious moles, no rashes   Neurologic:  On gross examination there is no motor or sensory deficit.  Patient walks with a " "normal gait       ASSESSMENT / PLAN:   Sage was seen today for recheck medication and wellness visit.    Diagnoses and all orders for this visit:    Medicare annual wellness visit, subsequent    Screen for colon cancer  -     COLOGUARD([employer]); Future    Essential hypertension    Prostate cancer (H)    Other orders  -     Pneumococcal 20 Valent Conjugate (PCV20)           Patient has been advised of split billing requirements and indicates understanding: Yes      COUNSELING:  Reviewed preventive health counseling, as reflected in patient instructions       Regular exercise       Healthy diet/nutrition       Vision screening       Dental care       Colon cancer screening      BMI:   Estimated body mass index is 31.56 kg/m  as calculated from the following:    Height as of this encounter: 1.753 m (5' 9\").    Weight as of this encounter: 96.9 kg (213 lb 11.2 oz).   Weight management plan: Discussed healthy diet and exercise guidelines      He reports that he has never smoked. He has never used smokeless tobacco.      Appropriate preventive services were discussed with this patient, including applicable screening as appropriate for cardiovascular disease, diabetes, osteopenia/osteoporosis, and glaucoma.  As appropriate for age/gender, discussed screening for colorectal cancer, prostate cancer, breast cancer, and cervical cancer. Checklist reviewing preventive services available has been given to the patient.    Reviewed patients plan of care and provided an AVS. The Basic Care Plan (routine screening as documented in Health Maintenance) for Sage meets the Care Plan requirement. This Care Plan has been established and reviewed with the Patient.          William Meng MD, MD  [facility]    Identified Health Risks:        "

## 2023-09-17 LAB — NONINV COLON CA DNA+OCC BLD SCRN STL QL: NEGATIVE

## 2023-09-27 NOTE — PROGRESS NOTES
[facility] Hematology and Oncology Consult Note      Patient: Sage Jones  MRN: [id number]  Date of Service: Aug 21, 2023      We have been asked by Dr. Camacho to evaluate Sage Jones for prostate cancer    Assessment/Plan:    1.  Prostate cancer: He appears to have disease confined to the prostate at diagnosis.  He is receiving Lupron every 6 months as well as abiraterone and prednisone.  His next Lupron will be due October 20, 2023.  We will see him every 3 months with a PSA and labs.  Would anticipate 2 years of treatment.  Guidelines suggest anywhere from 18 to 36 months of ADT. STAMPEDE trial used abiraterone and prednisone for 24 months.  We can see him back in clinic in October when he comes in for his next Lupron injection.  His labs from July 20 were reviewed.  His PSA was less than 0.1.  Alkaline phosphatase was normal at 122.  Questions were answered.    Medical decision Making:  Today's visit was centered around a cancer diagnosis in the setting of additional medical comorbidities. Complex medical decision making was required. The risk of additional morbidity without further treatment is high.     ECOG Performance  1    Staging History:    Cancer Staging   No matching staging information was found for the patient.    History:    Rich is a 73-year-old male who is referred for evaluation of prostate cancer.  The patient has a history of prostate cancer initially diagnosed in June 2022.  He presented with obstructive symptoms.  At the time of diagnosis there was suspicion of marek metastases in the bilateral iliac nodes.  Initial MRI showed a PI-RADS 5 lesion measuring 4 x 5 x 6.3 cm.  There was extracapsular extension involving the rectal wall and external inner sphincter.  Also question of bilateral iliac marek mets with the largest measuring 7.6 mm.  No evidence of neurovascular or seminal vesicle involvement.  TURBT revealed a Macarena 4+5 with extraprostatic extension and perineural  invasion.  He was staged as 3C (cT3a cN0 cM0) grade group 5.  A PSMA PET scan September 1, 2022 showed disease confined to the prostate.  PSA was 92.  Stage T3a.  He started Firmagon and then Lupron, abiraterone and prednisone in October 2022.  Radiation was started November 21, 2022.  The plan is for a 2-year course of androgen deprivation therapy.  He has been treated at the [facility] he had a visit there in July and it was recommended that he follow-up closer to home for convenience.  He has been tolerating his treatment okay.  No significant vasomotor symptoms.  Their plan was for abiraterone for 2 years and Lupron for 2 to 3 years.    Past History:    Past Medical History:   Diagnosis Date    Mumps     No family history on file.   [unfilled] Social History     Socioeconomic History    Marital status:      Spouse name: Not on file    Number of children: Not on file    Years of education: Not on file    Highest education level: Not on file   Occupational History    Not on file   Tobacco Use    Smoking status: Never    Smokeless tobacco: Never   Vaping Use    Vaping Use: Never used   Substance and Sexual Activity    Alcohol use: Not on file    Drug use: Not on file    Sexual activity: Not on file   Other Topics Concern    Not on file   Social History Narrative    Not on file     Social Determinants of Health     Financial Resource Strain: Not on file   Food Insecurity: Not on file   Transportation Needs: Not on file   Physical Activity: Not on file   Stress: Not on file   Social Connections: Not on file   Interpersonal Safety: Not on file   Housing Stability: Not on file        Allergies:    No Known Allergies    Review of Systems:    As above in the history.     Review of Systems otherwise Negative for:  General: chills, fever or night sweats  Psychological: anxiety or depression  Ophthalmic: blurry vision, double vision or loss of vision, vision change  ENT: epistaxis, oral lesions, hearing  "changes  Hematological and Lymphatic: bleeding, bruising, jaundice, swollen lymph nodes  Endocrine: hot flashes, unexpected weight changes  Respiratory: cough, hemoptysis, orthopnea or shortness of breath/ORTIZ  Cardiovascular: chest pain, edema, palpitations or PND  Gastrointestinal: abdominal pain, blood in stools, change in bowel habits, constipation, diarrhea or nausea/vomiting  Genito-Urinary: change in urinary stream, incontinence, frequency/urgency  Musculoskeletal: joint pain, stiffness, swelling, muscle pain  Neurological: dizziness, headaches, numbness/tingling  Dermatological: lumps and rash    Physical Exam:    BP (!) 147/72   Pulse 68   Resp 18   Ht 1.753 m (5' 9\")   Wt 97.1 kg (214 lb)   SpO2 99%   BMI 31.60 kg/m      General: patient appears stated age of 73 year old. Nontoxic and in no distress.   HEENT: Head: atraumatic, normocephalic. Sclerae anicteric.  Chest:  Normal respiratory effort  Cardiac:  No edema.   Abdomen: abdomen is non-distended  Extremities: normal tone and muscle bulk.   Skin: no lesions or rash on visible skin. Warm and dry.   CNS: alert and oriented. Grossly non-focal.   Psychiatric: normal mood and affect.     Lab Results:    No results found for this or any previous visit (from the past 168 hour(s)).    Imaging Results:    No results found.      Signed by: Reed Bright MD    "

## 2023-10-11 ENCOUNTER — MYC MEDICAL ADVICE (OUTPATIENT)
Dept: FAMILY MEDICINE | Facility: CLINIC | Age: 73
End: 2023-10-11
Payer: MEDICARE

## 2023-10-11 VITALS — SYSTOLIC BLOOD PRESSURE: 133 MMHG | DIASTOLIC BLOOD PRESSURE: 77 MMHG

## 2023-10-16 ENCOUNTER — LAB (OUTPATIENT)
Dept: INFUSION THERAPY | Facility: CLINIC | Age: 73
End: 2023-10-16
Attending: INTERNAL MEDICINE
Payer: MEDICARE

## 2023-10-16 ENCOUNTER — ONCOLOGY VISIT (OUTPATIENT)
Dept: ONCOLOGY | Facility: CLINIC | Age: 73
End: 2023-10-16
Attending: INTERNAL MEDICINE
Payer: MEDICARE

## 2023-10-16 VITALS
SYSTOLIC BLOOD PRESSURE: 148 MMHG | OXYGEN SATURATION: 100 % | BODY MASS INDEX: 32.65 KG/M2 | TEMPERATURE: 97.8 F | HEART RATE: 78 BPM | DIASTOLIC BLOOD PRESSURE: 88 MMHG | WEIGHT: 221.1 LBS

## 2023-10-16 DIAGNOSIS — C61 PROSTATE CANCER (H): ICD-10-CM

## 2023-10-16 DIAGNOSIS — C61 PRIMARY MALIGNANT NEOPLASM OF PROSTATE (H): Primary | ICD-10-CM

## 2023-10-16 LAB
ALBUMIN SERPL BCG-MCNC: 4 G/DL (ref 3.5–5.2)
ALP SERPL-CCNC: 91 U/L (ref 40–129)
ALT SERPL W P-5'-P-CCNC: 22 U/L (ref 0–70)
ANION GAP SERPL CALCULATED.3IONS-SCNC: 13 MMOL/L (ref 7–15)
AST SERPL W P-5'-P-CCNC: 25 U/L (ref 0–45)
BASO+EOS+MONOS # BLD AUTO: ABNORMAL 10*3/UL
BASO+EOS+MONOS NFR BLD AUTO: ABNORMAL %
BASOPHILS # BLD AUTO: 0.1 10E3/UL (ref 0–0.2)
BASOPHILS NFR BLD AUTO: 1 %
BILIRUB SERPL-MCNC: 0.8 MG/DL
BUN SERPL-MCNC: 12.1 MG/DL (ref 8–23)
CALCIUM SERPL-MCNC: 8.8 MG/DL (ref 8.8–10.2)
CHLORIDE SERPL-SCNC: 104 MMOL/L (ref 98–107)
CREAT SERPL-MCNC: 0.64 MG/DL (ref 0.67–1.17)
DEPRECATED HCO3 PLAS-SCNC: 24 MMOL/L (ref 22–29)
EGFRCR SERPLBLD CKD-EPI 2021: >90 ML/MIN/1.73M2
EOSINOPHIL # BLD AUTO: 0.2 10E3/UL (ref 0–0.7)
EOSINOPHIL NFR BLD AUTO: 5 %
ERYTHROCYTE [DISTWIDTH] IN BLOOD BY AUTOMATED COUNT: 13.4 % (ref 10–15)
GLUCOSE SERPL-MCNC: 123 MG/DL (ref 70–99)
HCT VFR BLD AUTO: 35.5 % (ref 40–53)
HGB BLD-MCNC: 13.1 G/DL (ref 13.3–17.7)
IMM GRANULOCYTES # BLD: 0 10E3/UL
IMM GRANULOCYTES NFR BLD: 0 %
LYMPHOCYTES # BLD AUTO: 0.8 10E3/UL (ref 0.8–5.3)
LYMPHOCYTES NFR BLD AUTO: 17 %
MCH RBC QN AUTO: 34.6 PG (ref 26.5–33)
MCHC RBC AUTO-ENTMCNC: 36.9 G/DL (ref 31.5–36.5)
MCV RBC AUTO: 94 FL (ref 78–100)
MONOCYTES # BLD AUTO: 0.6 10E3/UL (ref 0–1.3)
MONOCYTES NFR BLD AUTO: 12 %
NEUTROPHILS # BLD AUTO: 2.9 10E3/UL (ref 1.6–8.3)
NEUTROPHILS NFR BLD AUTO: 65 %
NRBC # BLD AUTO: 0 10E3/UL
NRBC BLD AUTO-RTO: 0 /100
PLATELET # BLD AUTO: 164 10E3/UL (ref 150–450)
POTASSIUM SERPL-SCNC: 3.6 MMOL/L (ref 3.4–5.3)
PROT SERPL-MCNC: 6.3 G/DL (ref 6.4–8.3)
PSA SERPL DL<=0.01 NG/ML-MCNC: <0.01 NG/ML (ref 0–6.5)
RBC # BLD AUTO: 3.79 10E6/UL (ref 4.4–5.9)
SODIUM SERPL-SCNC: 141 MMOL/L (ref 135–145)
WBC # BLD AUTO: 4.6 10E3/UL (ref 4–11)

## 2023-10-16 PROCEDURE — G0463 HOSPITAL OUTPT CLINIC VISIT: HCPCS | Mod: 25 | Performed by: INTERNAL MEDICINE

## 2023-10-16 PROCEDURE — 250N000011 HC RX IP 250 OP 636: Mod: JZ | Performed by: INTERNAL MEDICINE

## 2023-10-16 PROCEDURE — 84153 ASSAY OF PSA TOTAL: CPT

## 2023-10-16 PROCEDURE — 96402 CHEMO HORMON ANTINEOPL SQ/IM: CPT

## 2023-10-16 PROCEDURE — 99214 OFFICE O/P EST MOD 30 MIN: CPT | Performed by: INTERNAL MEDICINE

## 2023-10-16 PROCEDURE — 36415 COLL VENOUS BLD VENIPUNCTURE: CPT

## 2023-10-16 PROCEDURE — 85025 COMPLETE CBC W/AUTO DIFF WBC: CPT

## 2023-10-16 PROCEDURE — 80053 COMPREHEN METABOLIC PANEL: CPT

## 2023-10-16 RX ADMIN — LEUPROLIDE ACETATE 45 MG: KIT SUBCUTANEOUS at 14:15

## 2023-10-16 ASSESSMENT — PAIN SCALES - GENERAL: PAINLEVEL: NO PAIN (0)

## 2023-10-16 NOTE — PROGRESS NOTES
Infusion Nursing Note:  Sage Jones presents today for Eligard.    Patient seen by provider today: Yes: Dr. Bright   present during visit today: Not Applicable.    Note: Rich presents to [facility] center following his appointment with Dr. Bright.  Wallace given upper left abdomen. Tolerated well.      Intravenous Access:  No Intravenous access/labs at this visit.    Treatment Conditions:  Not Applicable.      Post Infusion Assessment:  Patient tolerated injection without incident.       Discharge Plan:   AVS to patient via MYCHART.  Patient will return in 6 months for next appointment.   Patient discharged in stable condition accompanied by: self.  Departure Mode: Ambulatory.      Kayla Sprague RN

## 2023-10-16 NOTE — LETTER
"    10/16/2023         RE: Sage Jones  [address]  [place] [address]        Dear Colleague,    Thank you for referring your patient, Sage Jones, to the [facility]. Please see a copy of my visit note below.    Oncology Rooming Note    October 16, 2023 1:06 PM   Sage Jones is a 73 year old male who presents for:    Chief Complaint   Patient presents with     Oncology Clinic Visit     Primary malignant neoplasm of prostate     Initial Vitals: BP (!) 148/88 (BP Location: Left arm, Patient Position: Sitting, Cuff Size: Adult Regular)   Pulse 78   Temp 97.8  F (36.6  C) (Oral)   Wt 100.3 kg (221 lb 1.6 oz)   SpO2 100%   BMI 32.65 kg/m   Estimated body mass index is 32.65 kg/m  as calculated from the following:    Height as of 8/22/23: 1.753 m (5' 9\").    Weight as of this encounter: 100.3 kg (221 lb 1.6 oz). Body surface area is 2.21 meters squared.  No Pain (0) Comment: Data Unavailable   No LMP for male patient.  Allergies reviewed: Yes  Medications reviewed: Yes    Medications: Medication refills not needed today.  Pharmacy name entered into [facility]:    [facility] [address] [place] [address] [id number] [place], [place] - [address] [facility]    Clinical concerns: None       Steph Woodward MA              [facility] Hematology and Oncology Progress Note    Patient: Sage Jones  MRN: [id number]  Date of Service: Oct 16, 2023        Assessment and Plan:    1.  Prostate cancer: He stopped his abiraterone and prednisone about a month ago as he was having significant weakness and depression.  The symptoms have improved significantly since stopping.  We will move forward now with only the Eligard every 6 months.  He will be getting a dose today.  Next week in April.  We will see him in clinic every 3 months with labs.  I reviewed his labs from today.  PSA is pending.  CBC looks okay with a white count of 4.6, hemoglobin " 13.1, platelets 164,000.    ECOG Performance  0    Diagnosis:    1.  Prostate cancer: To be diagnosed June 2022. At the time of diagnosis there was suspicion of marek metastases in the bilateral iliac nodes.  Initial MRI showed a PI-RADS 5 lesion measuring 4 x 5 x 6.3 cm.  There was extracapsular extension involving the rectal wall and external inner sphincter.  Also question of bilateral iliac marek mets with the largest measuring 7.6 mm.  TURBT revealed a Macarena 4+5 with extraprostatic extension and perineural invasion. He was staged as 3C (cT3a cN0 cM0) grade group 5.  A PSMA PET scan September 1, 2022 showed disease confined to the prostate.  PSA was 92.  Stage T3a.     Treatment:    He initially received Firmagon and then Lupron, abiraterone and prednisone in October 2022.  Radiation was started November 21, 2022.   Abiraterone and prednisone stopped in Sept., 2023 secondary to depression and extreme weakness.    Interim History:    Rich comes in today for follow-up visit.  She is due for Lupron shot today.  He notes that since stopping his abiraterone a month ago his weakness in his legs has improved dramatically.  Compression is also improved.  He is quite happy with how he feels at this point.  No acute complaints today.    Review of Systems:    As above in the history.     Review of Systems otherwise Negative for:  General: chills, fever or night sweats  Psychological: anxiety or depression  Ophthalmic: blurry vision, double vision or loss of vision, vision change  ENT: epistaxis, oral lesions, hearing changes  Hematological and Lymphatic: bleeding, bruising, jaundice, swollen lymph nodes  Endocrine: hot flashes, unexpected weight changes  Respiratory: cough, hemoptysis, orthopnea or shortness of breath/ORTIZ  Cardiovascular: chest pain, edema, palpitations or PND  Gastrointestinal: abdominal pain, blood in stools, change in bowel habits, constipation, diarrhea or nausea/vomiting  Genito-Urinary: change in  urinary stream, incontinence, frequency/urgency  Musculoskeletal: joint pain, stiffness, swelling, muscle pain  Neurological: dizziness, headaches, numbness/tingling  Dermatological: lumps and rash    Past History:    Past Medical History:   Diagnosis Date     Mumps      Physical Exam:    BP (!) 148/88 (BP Location: Left arm, Patient Position: Sitting, Cuff Size: Adult Regular)   Pulse 78   Temp 97.8  F (36.6  C) (Oral)   Wt 100.3 kg (221 lb 1.6 oz)   SpO2 100%   BMI 32.65 kg/m      General: patient appears stated age of 73 year old. Nontoxic and in no distress.   HEENT: Head: atraumatic, normocephalic. Sclerae anicteric.  Chest:  Normal respiratory effort  Cardiac:  No edema.   Abdomen: abdomen is non-distended  Extremities: normal tone and muscle bulk.  Skin: no lesions or rash on visible skin. Warm and dry.   CNS: alert and oriented. Grossly non-focal.   Psychiatric: normal mood and affect.     Lab Results:    Recent Results (from the past 168 hour(s))   Comprehensive metabolic panel   Result Value Ref Range    Sodium 141 135 - 145 mmol/L    Potassium 3.6 3.4 - 5.3 mmol/L    Carbon Dioxide (CO2) 24 22 - 29 mmol/L    Anion Gap 13 7 - 15 mmol/L    Urea Nitrogen 12.1 8.0 - 23.0 mg/dL    Creatinine 0.64 (L) 0.67 - 1.17 mg/dL    GFR Estimate >90 >60 mL/min/1.73m2    Calcium 8.8 8.8 - 10.2 mg/dL    Chloride 104 98 - 107 mmol/L    Glucose 123 (H) 70 - 99 mg/dL    Alkaline Phosphatase 91 40 - 129 U/L    AST 25 0 - 45 U/L    ALT 22 0 - 70 U/L    Protein Total 6.3 (L) 6.4 - 8.3 g/dL    Albumin 4.0 3.5 - 5.2 g/dL    Bilirubin Total 0.8 <=1.2 mg/dL   CBC with platelets and differential   Result Value Ref Range    WBC Count 4.6 4.0 - 11.0 10e3/uL    RBC Count 3.79 (L) 4.40 - 5.90 10e6/uL    Hemoglobin 13.1 (L) 13.3 - 17.7 g/dL    Hematocrit 35.5 (L) 40.0 - 53.0 %    MCV 94 78 - 100 fL    MCH 34.6 (H) 26.5 - 33.0 pg    MCHC 36.9 (H) 31.5 - 36.5 g/dL    RDW 13.4 10.0 - 15.0 %    Platelet Count 164 150 - 450 10e3/uL    %  Neutrophils 65 %    % Lymphocytes 17 %    % Monocytes 12 %    Mids % (Monos, Eos, Basos)      % Eosinophils 5 %    % Basophils 1 %    % Immature Granulocytes 0 %    NRBCs per 100 WBC 0 <1 /100    Absolute Neutrophils 2.9 1.6 - 8.3 10e3/uL    Absolute Lymphocytes 0.8 0.8 - 5.3 10e3/uL    Absolute Monocytes 0.6 0.0 - 1.3 10e3/uL    Mids Abs (Monos, Eos, Basos)      Absolute Eosinophils 0.2 0.0 - 0.7 10e3/uL    Absolute Basophils 0.1 0.0 - 0.2 10e3/uL    Absolute Immature Granulocytes 0.0 <=0.4 10e3/uL    Absolute NRBCs 0.0 10e3/uL     Imaging:    No results found.      Signed by: Reed Bright MD      Again, thank you for allowing me to participate in the care of your patient.        Sincerely,        Reed Bright MD

## 2023-10-16 NOTE — PROGRESS NOTES
"Oncology Rooming Note    October 16, 2023 1:06 PM   Sage Jones is a 73 year old male who presents for:    Chief Complaint   Patient presents with    Oncology Clinic Visit     Primary malignant neoplasm of prostate     Initial Vitals: BP (!) 148/88 (BP Location: Left arm, Patient Position: Sitting, Cuff Size: Adult Regular)   Pulse 78   Temp 97.8  F (36.6  C) (Oral)   Wt 100.3 kg (221 lb 1.6 oz)   SpO2 100%   BMI 32.65 kg/m   Estimated body mass index is 32.65 kg/m  as calculated from the following:    Height as of 8/22/23: 1.753 m (5' 9\").    Weight as of this encounter: 100.3 kg (221 lb 1.6 oz). Body surface area is 2.21 meters squared.  No Pain (0) Comment: Data Unavailable   No LMP for male patient.  Allergies reviewed: Yes  Medications reviewed: Yes    Medications: Medication refills not needed today.  Pharmacy name entered into [facility]:    [facility] [address] [place], [place] - [address] [facility] [id number] [place], [place] - [address] [facility]    Clinical concerns: None       Steph Woodward MA            "

## 2023-10-16 NOTE — PROGRESS NOTES
[facility] Hematology and Oncology Progress Note    Patient: Sage COOMBS Gritten  MRN: [id number]  Date of Service: Oct 16, 2023        Assessment and Plan:    1.  Prostate cancer: He stopped his abiraterone and prednisone about a month ago as he was having significant weakness and depression.  The symptoms have improved significantly since stopping.  We will move forward now with only the Eligard every 6 months.  He will be getting a dose today.  Next week in April.  We will see him in clinic every 3 months with labs.  I reviewed his labs from today.  PSA is pending.  CBC looks okay with a white count of 4.6, hemoglobin 13.1, platelets 164,000.    ECOG Performance  0    Diagnosis:    1.  Prostate cancer: To be diagnosed June 2022. At the time of diagnosis there was suspicion of marek metastases in the bilateral iliac nodes.  Initial MRI showed a PI-RADS 5 lesion measuring 4 x 5 x 6.3 cm.  There was extracapsular extension involving the rectal wall and external inner sphincter.  Also question of bilateral iliac marek mets with the largest measuring 7.6 mm.  TURBT revealed a [place] 4+5 with extraprostatic extension and perineural invasion. He was staged as 3C (cT3a cN0 cM0) grade group 5.  A PSMA PET scan September 1, 2022 showed disease confined to the prostate.  PSA was 92.  Stage T3a.     Treatment:    He initially received Firmagon and then Lupron, abiraterone and prednisone in October 2022.  Radiation was started November 21, 2022.   Abiraterone and prednisone stopped in Sept., 2023 secondary to depression and extreme weakness.    Interim History:    Rich comes in today for follow-up visit.  She is due for Lupron shot today.  He notes that since stopping his abiraterone a month ago his weakness in his legs has improved dramatically.  Compression is also improved.  He is quite happy with how he feels at this point.  No acute complaints today.    Review of Systems:    As above in the history.     Review of  Systems otherwise Negative for:  General: chills, fever or night sweats  Psychological: anxiety or depression  Ophthalmic: blurry vision, double vision or loss of vision, vision change  ENT: epistaxis, oral lesions, hearing changes  Hematological and Lymphatic: bleeding, bruising, jaundice, swollen lymph nodes  Endocrine: hot flashes, unexpected weight changes  Respiratory: cough, hemoptysis, orthopnea or shortness of breath/ORTIZ  Cardiovascular: chest pain, edema, palpitations or PND  Gastrointestinal: abdominal pain, blood in stools, change in bowel habits, constipation, diarrhea or nausea/vomiting  Genito-Urinary: change in urinary stream, incontinence, frequency/urgency  Musculoskeletal: joint pain, stiffness, swelling, muscle pain  Neurological: dizziness, headaches, numbness/tingling  Dermatological: lumps and rash    Past History:    Past Medical History:   Diagnosis Date    Mumps      Physical Exam:    BP (!) 148/88 (BP Location: Left arm, Patient Position: Sitting, Cuff Size: Adult Regular)   Pulse 78   Temp 97.8  F (36.6  C) (Oral)   Wt 100.3 kg (221 lb 1.6 oz)   SpO2 100%   BMI 32.65 kg/m      General: patient appears stated age of 73 year old. Nontoxic and in no distress.   HEENT: Head: atraumatic, normocephalic. Sclerae anicteric.  Chest:  Normal respiratory effort  Cardiac:  No edema.   Abdomen: abdomen is non-distended  Extremities: normal tone and muscle bulk.  Skin: no lesions or rash on visible skin. Warm and dry.   CNS: alert and oriented. Grossly non-focal.   Psychiatric: normal mood and affect.     Lab Results:    Recent Results (from the past 168 hour(s))   Comprehensive metabolic panel   Result Value Ref Range    Sodium 141 135 - 145 mmol/L    Potassium 3.6 3.4 - 5.3 mmol/L    Carbon Dioxide (CO2) 24 22 - 29 mmol/L    Anion Gap 13 7 - 15 mmol/L    Urea Nitrogen 12.1 8.0 - 23.0 mg/dL    Creatinine 0.64 (L) 0.67 - 1.17 mg/dL    GFR Estimate >90 >60 mL/min/1.73m2    Calcium 8.8 8.8 - 10.2  mg/dL    Chloride 104 98 - 107 mmol/L    Glucose 123 (H) 70 - 99 mg/dL    Alkaline Phosphatase 91 40 - 129 U/L    AST 25 0 - 45 U/L    ALT 22 0 - 70 U/L    Protein Total 6.3 (L) 6.4 - 8.3 g/dL    Albumin 4.0 3.5 - 5.2 g/dL    Bilirubin Total 0.8 <=1.2 mg/dL   CBC with platelets and differential   Result Value Ref Range    WBC Count 4.6 4.0 - 11.0 10e3/uL    RBC Count 3.79 (L) 4.40 - 5.90 10e6/uL    Hemoglobin 13.1 (L) 13.3 - 17.7 g/dL    Hematocrit 35.5 (L) 40.0 - 53.0 %    MCV 94 78 - 100 fL    MCH 34.6 (H) 26.5 - 33.0 pg    MCHC 36.9 (H) 31.5 - 36.5 g/dL    RDW 13.4 10.0 - 15.0 %    Platelet Count 164 150 - 450 10e3/uL    % Neutrophils 65 %    % Lymphocytes 17 %    % Monocytes 12 %    Mids % (Monos, Eos, Basos)      % Eosinophils 5 %    % Basophils 1 %    % Immature Granulocytes 0 %    NRBCs per 100 WBC 0 <1 /100    Absolute Neutrophils 2.9 1.6 - 8.3 10e3/uL    Absolute Lymphocytes 0.8 0.8 - 5.3 10e3/uL    Absolute Monocytes 0.6 0.0 - 1.3 10e3/uL    Mids Abs (Monos, Eos, Basos)      Absolute Eosinophils 0.2 0.0 - 0.7 10e3/uL    Absolute Basophils 0.1 0.0 - 0.2 10e3/uL    Absolute Immature Granulocytes 0.0 <=0.4 10e3/uL    Absolute NRBCs 0.0 10e3/uL     Imaging:    No results found.      Signed by: Reed Bright MD

## 2023-11-13 NOTE — PROGRESS NOTES
[facility] Hematology and Oncology Progress Note    Patient: Sage COOMBS Gritten  MRN: [id number]  Date of Service: Nov 13, 2023        Assessment and Plan:    1.  Prostate cancer: He stopped his abiraterone and prednisone about a month ago as he was having significant weakness and depression.  The symptoms have improved significantly since stopping.  We will move forward now with only the Eligard every 6 months.  He will be getting a dose today.  Next week in April.  We will see him in clinic every 3 months with labs.  I reviewed his labs from today.  PSA is pending.  CBC looks okay with a white count of 4.6, hemoglobin 13.1, platelets 164,000.    Labs obtained after October shot: Options are  Monthly dosing of Eligard, steroid prophylaxis  Casodex  Enzalutamide     Observation      ECOG Performance  0    Diagnosis:    1.  Prostate cancer: To be diagnosed June 2022. At the time of diagnosis there was suspicion of marek metastases in the bilateral iliac nodes.  Initial MRI showed a PI-RADS 5 lesion measuring 4 x 5 x 6.3 cm.  There was extracapsular extension involving the rectal wall and external inner sphincter.  Also question of bilateral iliac marek mets with the largest measuring 7.6 mm.  TURBT revealed a Macarena 4+5 with extraprostatic extension and perineural invasion. He was staged as 3C (cT3a cN0 cM0) grade group 5.  A PSMA PET scan September 1, 2022 showed disease confined to the prostate.  PSA was 92.  Stage T3a.     Treatment:    He initially received Firmagon and then Lupron, abiraterone and prednisone in October 2022.  Radiation was started November 21, 2022.   Abiraterone and prednisone stopped in Sept., 2023 secondary to depression and extreme weakness.    Interim History:    Rich comes in today for follow-up visit.  She is due for Lupron shot today.  He notes that since stopping his abiraterone a month ago his weakness in his legs has improved dramatically.  Compression is also improved.  He is  quite happy with how he feels at this point.  No acute complaints today.    Review of Systems:    As above in the history.     Review of Systems otherwise Negative for:  General: chills, fever or night sweats  Psychological: anxiety or depression  Ophthalmic: blurry vision, double vision or loss of vision, vision change  ENT: epistaxis, oral lesions, hearing changes  Hematological and Lymphatic: bleeding, bruising, jaundice, swollen lymph nodes  Endocrine: hot flashes, unexpected weight changes  Respiratory: cough, hemoptysis, orthopnea or shortness of breath/ORTIZ  Cardiovascular: chest pain, edema, palpitations or PND  Gastrointestinal: abdominal pain, blood in stools, change in bowel habits, constipation, diarrhea or nausea/vomiting  Genito-Urinary: change in urinary stream, incontinence, frequency/urgency  Musculoskeletal: joint pain, stiffness, swelling, muscle pain  Neurological: dizziness, headaches, numbness/tingling  Dermatological: lumps and rash    Past History:    Past Medical History:   Diagnosis Date    Mumps      Physical Exam:    There were no vitals taken for this visit.    General: patient appears stated age of 73 year old. Nontoxic and in no distress.   HEENT: Head: atraumatic, normocephalic. Sclerae anicteric.  Chest:  Normal respiratory effort  Cardiac:  No edema.   Abdomen: abdomen is non-distended  Extremities: normal tone and muscle bulk.  Skin: no lesions or rash on visible skin. Warm and dry.   CNS: alert and oriented. Grossly non-focal.   Psychiatric: normal mood and affect.     Lab Results:    No results found for this or any previous visit (from the past 168 hour(s)).    Imaging:    No results found.      Signed by: Reed Bright MD

## 2023-11-13 NOTE — PROGRESS NOTES
[facility] Hematology and Oncology Progress Note    Patient: Sage COOMBS Gritten  MRN: [id number]  Date of Service: Nov 13, 2023        Assessment and Plan:    1.  Prostate cancer: He stopped his abiraterone and prednisone about a month ago as he was having significant weakness and depression.  The symptoms have improved significantly since stopping.  We will move forward now with only the Eligard every 6 months.  He will be getting a dose today.  Next week in April.  We will see him in clinic every 3 months with labs.  I reviewed his labs from today.  PSA is pending.  CBC looks okay with a white count of 4.6, hemoglobin 13.1, platelets 164,000.      Labs obtained after October shot: Options are  Monthly dosing of Eligard, steroid prophylaxis  Casodex  Enzalutamide    Observation        ECOG Performance  0    Diagnosis:    1.  Prostate cancer: To be diagnosed June 2022. At the time of diagnosis there was suspicion of marek metastases in the bilateral iliac nodes.  Initial MRI showed a PI-RADS 5 lesion measuring 4 x 5 x 6.3 cm.  There was extracapsular extension involving the rectal wall and external inner sphincter.  Also question of bilateral iliac marek mets with the largest measuring 7.6 mm.  TURBT revealed a Fred[place] 4+5 with extraprostatic extension and perineural invasion. He was staged as 3C (cT3a cN0 cM0) grade group 5.  A PSMA PET scan September 1, 2022 showed disease confined to the prostate.  PSA was 92.  Stage T3a.     Treatment:    He initially received Firmagon and then Lupron, abiraterone and prednisone in October 2022.  Radiation was started November 21, 2022.   Abiraterone and prednisone stopped in Sept., 2023 secondary to depression and extreme weakness.    Interim History:    Rich comes in today for follow-up visit.  She is due for Lupron shot today.  He notes that since stopping his abiraterone a month ago his weakness in his legs has improved dramatically.  Compression is also improved.  He is  quite happy with how he feels at this point.  No acute complaints today.    Review of Systems:    As above in the history.     Review of Systems otherwise Negative for:  General: chills, fever or night sweats  Psychological: anxiety or depression  Ophthalmic: blurry vision, double vision or loss of vision, vision change  ENT: epistaxis, oral lesions, hearing changes  Hematological and Lymphatic: bleeding, bruising, jaundice, swollen lymph nodes  Endocrine: hot flashes, unexpected weight changes  Respiratory: cough, hemoptysis, orthopnea or shortness of breath/ORTIZ  Cardiovascular: chest pain, edema, palpitations or PND  Gastrointestinal: abdominal pain, blood in stools, change in bowel habits, constipation, diarrhea or nausea/vomiting  Genito-Urinary: change in urinary stream, incontinence, frequency/urgency  Musculoskeletal: joint pain, stiffness, swelling, muscle pain  Neurological: dizziness, headaches, numbness/tingling  Dermatological: lumps and rash    Past History:    Past Medical History:   Diagnosis Date    Mumps      Physical Exam:    There were no vitals taken for this visit.    General: patient appears stated age of 73 year old. Nontoxic and in no distress.   HEENT: Head: atraumatic, normocephalic. Sclerae anicteric.  Chest:  Normal respiratory effort  Cardiac:  No edema.   Abdomen: abdomen is non-distended  Extremities: normal tone and muscle bulk.  Skin: no lesions or rash on visible skin. Warm and dry.   CNS: alert and oriented. Grossly non-focal.   Psychiatric: normal mood and affect.     Lab Results:    No results found for this or any previous visit (from the past 168 hour(s)).    Imaging:    No results found.      Signed by: Reed Bright MD

## 2023-12-10 DIAGNOSIS — I10 ESSENTIAL HYPERTENSION: ICD-10-CM

## 2023-12-10 DIAGNOSIS — R33.9 URINARY RETENTION: ICD-10-CM

## 2023-12-11 RX ORDER — AMLODIPINE BESYLATE 5 MG/1
TABLET ORAL
Qty: 90 TABLET | Refills: 3 | Status: SHIPPED | OUTPATIENT
Start: 2023-12-11

## 2023-12-11 RX ORDER — TAMSULOSIN HYDROCHLORIDE 0.4 MG/1
0.8 CAPSULE ORAL DAILY
Qty: 180 CAPSULE | Refills: 3 | Status: SHIPPED | OUTPATIENT
Start: 2023-12-11

## 2024-01-10 DIAGNOSIS — C61 PRIMARY MALIGNANT NEOPLASM OF PROSTATE (H): Primary | ICD-10-CM

## 2024-01-11 ENCOUNTER — LAB (OUTPATIENT)
Dept: INFUSION THERAPY | Facility: HOSPITAL | Age: 74
End: 2024-01-11
Attending: INTERNAL MEDICINE
Payer: COMMERCIAL

## 2024-01-11 DIAGNOSIS — C61 PRIMARY MALIGNANT NEOPLASM OF PROSTATE (H): ICD-10-CM

## 2024-01-11 LAB
ALBUMIN SERPL BCG-MCNC: 4 G/DL (ref 3.5–5.2)
ALP SERPL-CCNC: 119 U/L (ref 40–150)
ALT SERPL W P-5'-P-CCNC: 30 U/L (ref 0–70)
ANION GAP SERPL CALCULATED.3IONS-SCNC: 13 MMOL/L (ref 7–15)
AST SERPL W P-5'-P-CCNC: 26 U/L (ref 0–45)
BASOPHILS # BLD AUTO: 0.1 10E3/UL (ref 0–0.2)
BASOPHILS NFR BLD AUTO: 1 %
BILIRUB SERPL-MCNC: 0.6 MG/DL
BUN SERPL-MCNC: 15.5 MG/DL (ref 8–23)
CALCIUM SERPL-MCNC: 9.1 MG/DL (ref 8.8–10.2)
CHLORIDE SERPL-SCNC: 105 MMOL/L (ref 98–107)
CREAT SERPL-MCNC: 0.68 MG/DL (ref 0.67–1.17)
DEPRECATED HCO3 PLAS-SCNC: 24 MMOL/L (ref 22–29)
EGFRCR SERPLBLD CKD-EPI 2021: >90 ML/MIN/1.73M2
EOSINOPHIL # BLD AUTO: 0.3 10E3/UL (ref 0–0.7)
EOSINOPHIL NFR BLD AUTO: 7 %
ERYTHROCYTE [DISTWIDTH] IN BLOOD BY AUTOMATED COUNT: 12.7 % (ref 10–15)
GLUCOSE SERPL-MCNC: 137 MG/DL (ref 70–99)
HCT VFR BLD AUTO: 40.3 % (ref 40–53)
HGB BLD-MCNC: 14.9 G/DL (ref 13.3–17.7)
IMM GRANULOCYTES # BLD: 0 10E3/UL
IMM GRANULOCYTES NFR BLD: 0 %
LYMPHOCYTES # BLD AUTO: 1 10E3/UL (ref 0.8–5.3)
LYMPHOCYTES NFR BLD AUTO: 19 %
MCH RBC QN AUTO: 33.2 PG (ref 26.5–33)
MCHC RBC AUTO-ENTMCNC: 37 G/DL (ref 31.5–36.5)
MCV RBC AUTO: 90 FL (ref 78–100)
MONOCYTES # BLD AUTO: 0.6 10E3/UL (ref 0–1.3)
MONOCYTES NFR BLD AUTO: 11 %
NEUTROPHILS # BLD AUTO: 3.1 10E3/UL (ref 1.6–8.3)
NEUTROPHILS NFR BLD AUTO: 62 %
NRBC # BLD AUTO: 0 10E3/UL
NRBC BLD AUTO-RTO: 0 /100
PLATELET # BLD AUTO: 162 10E3/UL (ref 150–450)
POTASSIUM SERPL-SCNC: 3.6 MMOL/L (ref 3.4–5.3)
PROT SERPL-MCNC: 6.4 G/DL (ref 6.4–8.3)
PSA SERPL DL<=0.01 NG/ML-MCNC: <0.01 NG/ML (ref 0–6.5)
RBC # BLD AUTO: 4.49 10E6/UL (ref 4.4–5.9)
SODIUM SERPL-SCNC: 142 MMOL/L (ref 135–145)
WBC # BLD AUTO: 5 10E3/UL (ref 4–11)

## 2024-01-11 PROCEDURE — 85025 COMPLETE CBC W/AUTO DIFF WBC: CPT

## 2024-01-11 PROCEDURE — 80053 COMPREHEN METABOLIC PANEL: CPT

## 2024-01-11 PROCEDURE — 84153 ASSAY OF PSA TOTAL: CPT

## 2024-01-11 PROCEDURE — 36415 COLL VENOUS BLD VENIPUNCTURE: CPT

## 2024-01-15 ENCOUNTER — ONCOLOGY VISIT (OUTPATIENT)
Dept: ONCOLOGY | Facility: HOSPITAL | Age: 74
End: 2024-01-15
Attending: INTERNAL MEDICINE
Payer: COMMERCIAL

## 2024-01-15 VITALS
HEART RATE: 64 BPM | TEMPERATURE: 97.6 F | BODY MASS INDEX: 31.74 KG/M2 | WEIGHT: 214.9 LBS | OXYGEN SATURATION: 96 % | DIASTOLIC BLOOD PRESSURE: 75 MMHG | SYSTOLIC BLOOD PRESSURE: 133 MMHG | RESPIRATION RATE: 18 BRPM

## 2024-01-15 DIAGNOSIS — C61 PRIMARY MALIGNANT NEOPLASM OF PROSTATE (H): Primary | ICD-10-CM

## 2024-01-15 PROCEDURE — 99214 OFFICE O/P EST MOD 30 MIN: CPT | Performed by: INTERNAL MEDICINE

## 2024-01-15 PROCEDURE — G0463 HOSPITAL OUTPT CLINIC VISIT: HCPCS | Performed by: INTERNAL MEDICINE

## 2024-01-15 ASSESSMENT — PAIN SCALES - GENERAL: PAINLEVEL: EXTREME PAIN (8)

## 2024-01-15 NOTE — LETTER
"    1/15/2024         RE: Sage Jones  [address]  [place] [address]        Dear Colleague,    Thank you for referring your patient, Sage Jones, to the [facility]. Please see a copy of my visit note below.    Oncology Rooming Note    January 15, 2024 12:57 PM   Sage Jones is a 73 year old male who presents for:    Chief Complaint   Patient presents with     Oncology Clinic Visit     Primary malignant neoplasm of prostate (H)     Initial Vitals: /75 (BP Location: Left arm, Patient Position: Sitting, Cuff Size: Adult Large)   Pulse 64   Temp 97.6  F (36.4  C) (Oral)   Resp 18   Wt 97.5 kg (214 lb 14.4 oz)   SpO2 96%   BMI 31.74 kg/m   Estimated body mass index is 31.74 kg/m  as calculated from the following:    Height as of 8/22/23: 1.753 m (5' 9\").    Weight as of this encounter: 97.5 kg (214 lb 14.4 oz). Body surface area is 2.18 meters squared.  Extreme Pain (8) Comment: Data Unavailable   No LMP for male patient.  Allergies reviewed: Yes  Medications reviewed: Yes    Medications: Medication refills not needed today.  Pharmacy name entered into [facility]:    [facility] [address] [place], [place] - [address] [id number] [place], [place] - [address]    Frailty Screening:   Is the patient here for a new oncology consult visit in cancer care? 2. No      Clinical concerns: Would like to go off cancer medication   was notified.      Eva Elena LPN              [facility] Hematology and Oncology Progress Note    Patient: Sage Jones  MRN: [id number]  Date of Service: Aman 15, 2024        Assessment and Plan:    1.  Prostate cancer: Still having a lot of pain just his Eligard.  He is wondering if he can take a break from this.  I think it is okay in terms of life.  His PSA has become continue to see him to check his labs every 3 months.  We can restart the Eligard when his PSA starts to increase.  " At that time we can also consider using enzalutamide if he did not tolerate abiraterone well.  He felt well after stopping his abiraterone for about 3 weeks but then his symptoms returned after his Eligard shot.  PSA    CMP from January 11 close personally viewed and shows a calcium of 3.6, BUN 15.5 g creatinine 0.68, calcium 9.1 and alkaline phosphatase 119.    ECOG Performance  0    Diagnosis:    1.  Prostate cancer: To be diagnosed June 2022. At the time of diagnosis there was suspicion of marek metastases in the bilateral iliac nodes.  Initial MRI showed a PI-RADS 5 lesion measuring 4 x 5 x 6.3 cm.  There was extracapsular extension involving the rectal wall and external inner sphincter.  Also question of bilateral iliac marek mets with the largest measuring 7.6 mm.  TURBT revealed a [place] 4+5 with extraprostatic extension and perineural invasion. He was staged as 3C (cT3a cN0 cM0) grade group 5.  A PSMA PET scan September 1, 2022 showed disease confined to the prostate.  PSA was 92.  Stage T3a.     Treatment:    He initially received Firmagon and then Eligard (6 month), abiraterone and prednisone in October 2022.  Radiation was started November 21, 2022.   Abiraterone and prednisone stopped in Sept., 2023 secondary to depression and extreme weakness.    Interim History:    Rich comes in today for follow-up visit.  Having more problems with diffuse joint pain.  Involves most of his joints.  Significantly limiting his activity.  He is also having a fair amount of fatigue.  Also some hot flashes and chills.  Overall he is not able to do the things he would like to do.  He is not exercising anymore and is not nearly as active as he was prior to starting treatment.  He is worried he can hold his Eligard.    Review of Systems:    As above in the history.     Review of Systems otherwise Negative for:  General: chills, fever or night sweats  Psychological: anxiety or depression  Ophthalmic: blurry vision, double  vision or loss of vision, vision change  ENT: epistaxis, oral lesions, hearing changes  Hematological and Lymphatic: bleeding, bruising, jaundice, swollen lymph nodes  Endocrine: hot flashes, unexpected weight changes  Respiratory: cough, hemoptysis, orthopnea or shortness of breath/ORTIZ  Cardiovascular: chest pain, edema, palpitations or PND  Gastrointestinal: abdominal pain, blood in stools, change in bowel habits, constipation, diarrhea or nausea/vomiting  Genito-Urinary: change in urinary stream, incontinence, frequency/urgency  Musculoskeletal: joint pain, stiffness, swelling, muscle pain  Neurological: dizziness, headaches, numbness/tingling  Dermatological: lumps and rash    Past History:    Past Medical History:   Diagnosis Date     Mumps      Physical Exam:    /75 (BP Location: Left arm, Patient Position: Sitting, Cuff Size: Adult Large)   Pulse 64   Temp 97.6  F (36.4  C) (Oral)   Resp 18   Wt 97.5 kg (214 lb 14.4 oz)   SpO2 96%   BMI 31.74 kg/m      General: patient appears stated age of 73 year old. Nontoxic and in no distress.   HEENT: Head: atraumatic, normocephalic. Sclerae anicteric.  Chest:  Normal respiratory effort  Cardiac:  No edema.   Abdomen: abdomen is non-distended  Extremities: normal tone and muscle bulk.  Skin: no lesions or rash on visible skin. Warm and dry.   CNS: alert and oriented. Grossly non-focal.   Psychiatric: normal mood and affect.     Lab Results:    Recent Results (from the past 168 hour(s))   Comprehensive metabolic panel   Result Value Ref Range    Sodium 142 135 - 145 mmol/L    Potassium 3.6 3.4 - 5.3 mmol/L    Carbon Dioxide (CO2) 24 22 - 29 mmol/L    Anion Gap 13 7 - 15 mmol/L    Urea Nitrogen 15.5 8.0 - 23.0 mg/dL    Creatinine 0.68 0.67 - 1.17 mg/dL    GFR Estimate >90 >60 mL/min/1.73m2    Calcium 9.1 8.8 - 10.2 mg/dL    Chloride 105 98 - 107 mmol/L    Glucose 137 (H) 70 - 99 mg/dL    Alkaline Phosphatase 119 40 - 150 U/L    AST 26 0 - 45 U/L    ALT 30 0 -  70 U/L    Protein Total 6.4 6.4 - 8.3 g/dL    Albumin 4.0 3.5 - 5.2 g/dL    Bilirubin Total 0.6 <=1.2 mg/dL   PSA, tumor marker   Result Value Ref Range    PSA Tumor Marker <0.01 0.00 - 6.50 ng/mL   CBC with platelets and differential   Result Value Ref Range    WBC Count 5.0 4.0 - 11.0 10e3/uL    RBC Count 4.49 4.40 - 5.90 10e6/uL    Hemoglobin 14.9 13.3 - 17.7 g/dL    Hematocrit 40.3 40.0 - 53.0 %    MCV 90 78 - 100 fL    MCH 33.2 (H) 26.5 - 33.0 pg    MCHC 37.0 (H) 31.5 - 36.5 g/dL    RDW 12.7 10.0 - 15.0 %    Platelet Count 162 150 - 450 10e3/uL    % Neutrophils 62 %    % Lymphocytes 19 %    % Monocytes 11 %    % Eosinophils 7 %    % Basophils 1 %    % Immature Granulocytes 0 %    NRBCs per 100 WBC 0 <1 /100    Absolute Neutrophils 3.1 1.6 - 8.3 10e3/uL    Absolute Lymphocytes 1.0 0.8 - 5.3 10e3/uL    Absolute Monocytes 0.6 0.0 - 1.3 10e3/uL    Absolute Eosinophils 0.3 0.0 - 0.7 10e3/uL    Absolute Basophils 0.1 0.0 - 0.2 10e3/uL    Absolute Immature Granulocytes 0.0 <=0.4 10e3/uL    Absolute NRBCs 0.0 10e3/uL     Imaging:    No results found.      Signed by: Reed Bright MD      Again, thank you for allowing me to participate in the care of your patient.        Sincerely,        Reed Bright MD

## 2024-01-15 NOTE — PROGRESS NOTES
[facility] Hematology and Oncology Progress Note    Patient: Sage COOMBS Gritten  MRN: [id number]  Date of Service: Aman 15, 2024        Assessment and Plan:    1.  Prostate cancer: Still having a lot of pain just his Eligard.  He is wondering if he can take a break from this.  I think it is okay in terms of life.  His PSA has become continue to see him to check his labs every 3 months.  We can restart the Eligard when his PSA starts to increase.  At that time we can also consider using enzalutamide if he did not tolerate abiraterone well.  He felt well after stopping his abiraterone for about 3 weeks but then his symptoms returned after his Eligard shot.  PSA    CMP from January 11 close personally viewed and shows a calcium of 3.6, BUN 15.5 g creatinine 0.68, calcium 9.1 and alkaline phosphatase 119.    ECOG Performance  0    Diagnosis:    1.  Prostate cancer: To be diagnosed June 2022. At the time of diagnosis there was suspicion of marek metastases in the bilateral iliac nodes.  Initial MRI showed a PI-RADS 5 lesion measuring 4 x 5 x 6.3 cm.  There was extracapsular extension involving the rectal wall and external inner sphincter.  Also question of bilateral iliac marek mets with the largest measuring 7.6 mm.  TURBT revealed a [place] 4+5 with extraprostatic extension and perineural invasion. He was staged as 3C (cT3a cN0 cM0) grade group 5.  A PSMA PET scan September 1, 2022 showed disease confined to the prostate.  PSA was 92.  Stage T3a.     Treatment:    He initially received Firmagon and then Eligard (6 month), abiraterone and prednisone in October 2022.  Radiation was started November 21, 2022.   Abiraterone and prednisone stopped in Sept., 2023 secondary to depression and extreme weakness.    Interim History:    Rich comes in today for follow-up visit.  Having more problems with diffuse joint pain.  Involves most of his joints.  Significantly limiting his activity.  He is also having a fair amount of  fatigue.  Also some hot flashes and chills.  Overall he is not able to do the things he would like to do.  He is not exercising anymore and is not nearly as active as he was prior to starting treatment.  He is worried he can hold his Eligard.    Review of Systems:    As above in the history.     Review of Systems otherwise Negative for:  General: chills, fever or night sweats  Psychological: anxiety or depression  Ophthalmic: blurry vision, double vision or loss of vision, vision change  ENT: epistaxis, oral lesions, hearing changes  Hematological and Lymphatic: bleeding, bruising, jaundice, swollen lymph nodes  Endocrine: hot flashes, unexpected weight changes  Respiratory: cough, hemoptysis, orthopnea or shortness of breath/ORTIZ  Cardiovascular: chest pain, edema, palpitations or PND  Gastrointestinal: abdominal pain, blood in stools, change in bowel habits, constipation, diarrhea or nausea/vomiting  Genito-Urinary: change in urinary stream, incontinence, frequency/urgency  Musculoskeletal: joint pain, stiffness, swelling, muscle pain  Neurological: dizziness, headaches, numbness/tingling  Dermatological: lumps and rash    Past History:    Past Medical History:   Diagnosis Date    Mumps      Physical Exam:    /75 (BP Location: Left arm, Patient Position: Sitting, Cuff Size: Adult Large)   Pulse 64   Temp 97.6  F (36.4  C) (Oral)   Resp 18   Wt 97.5 kg (214 lb 14.4 oz)   SpO2 96%   BMI 31.74 kg/m      General: patient appears stated age of 73 year old. Nontoxic and in no distress.   HEENT: Head: atraumatic, normocephalic. Sclerae anicteric.  Chest:  Normal respiratory effort  Cardiac:  No edema.   Abdomen: abdomen is non-distended  Extremities: normal tone and muscle bulk.  Skin: no lesions or rash on visible skin. Warm and dry.   CNS: alert and oriented. Grossly non-focal.   Psychiatric: normal mood and affect.     Lab Results:    Recent Results (from the past 168 hour(s))   Comprehensive metabolic  panel   Result Value Ref Range    Sodium 142 135 - 145 mmol/L    Potassium 3.6 3.4 - 5.3 mmol/L    Carbon Dioxide (CO2) 24 22 - 29 mmol/L    Anion Gap 13 7 - 15 mmol/L    Urea Nitrogen 15.5 8.0 - 23.0 mg/dL    Creatinine 0.68 0.67 - 1.17 mg/dL    GFR Estimate >90 >60 mL/min/1.73m2    Calcium 9.1 8.8 - 10.2 mg/dL    Chloride 105 98 - 107 mmol/L    Glucose 137 (H) 70 - 99 mg/dL    Alkaline Phosphatase 119 40 - 150 U/L    AST 26 0 - 45 U/L    ALT 30 0 - 70 U/L    Protein Total 6.4 6.4 - 8.3 g/dL    Albumin 4.0 3.5 - 5.2 g/dL    Bilirubin Total 0.6 <=1.2 mg/dL   PSA, tumor marker   Result Value Ref Range    PSA Tumor Marker <0.01 0.00 - 6.50 ng/mL   CBC with platelets and differential   Result Value Ref Range    WBC Count 5.0 4.0 - 11.0 10e3/uL    RBC Count 4.49 4.40 - 5.90 10e6/uL    Hemoglobin 14.9 13.3 - 17.7 g/dL    Hematocrit 40.3 40.0 - 53.0 %    MCV 90 78 - 100 fL    MCH 33.2 (H) 26.5 - 33.0 pg    MCHC 37.0 (H) 31.5 - 36.5 g/dL    RDW 12.7 10.0 - 15.0 %    Platelet Count 162 150 - 450 10e3/uL    % Neutrophils 62 %    % Lymphocytes 19 %    % Monocytes 11 %    % Eosinophils 7 %    % Basophils 1 %    % Immature Granulocytes 0 %    NRBCs per 100 WBC 0 <1 /100    Absolute Neutrophils 3.1 1.6 - 8.3 10e3/uL    Absolute Lymphocytes 1.0 0.8 - 5.3 10e3/uL    Absolute Monocytes 0.6 0.0 - 1.3 10e3/uL    Absolute Eosinophils 0.3 0.0 - 0.7 10e3/uL    Absolute Basophils 0.1 0.0 - 0.2 10e3/uL    Absolute Immature Granulocytes 0.0 <=0.4 10e3/uL    Absolute NRBCs 0.0 10e3/uL     Imaging:    No results found.      Signed by: Reed Bright MD

## 2024-02-20 ENCOUNTER — OFFICE VISIT (OUTPATIENT)
Dept: FAMILY MEDICINE | Facility: CLINIC | Age: 74
End: 2024-02-20
Payer: COMMERCIAL

## 2024-02-20 ENCOUNTER — HOSPITAL ENCOUNTER (EMERGENCY)
Facility: CLINIC | Age: 74
Discharge: HOME | End: 2024-02-20
Payer: COMMERCIAL

## 2024-02-20 VITALS
DIASTOLIC BLOOD PRESSURE: 71 MMHG | WEIGHT: 200 LBS | BODY MASS INDEX: 29.62 KG/M2 | RESPIRATION RATE: 20 BRPM | TEMPERATURE: 97.5 F | HEART RATE: 94 BPM | OXYGEN SATURATION: 94 % | SYSTOLIC BLOOD PRESSURE: 150 MMHG | HEIGHT: 69 IN

## 2024-02-20 VITALS
RESPIRATION RATE: 18 BRPM | TEMPERATURE: 97.4 F | SYSTOLIC BLOOD PRESSURE: 151 MMHG | DIASTOLIC BLOOD PRESSURE: 82 MMHG | HEART RATE: 80 BPM | OXYGEN SATURATION: 96 %

## 2024-02-20 DIAGNOSIS — R11.2 NAUSEA AND VOMITING, UNSPECIFIED VOMITING TYPE: Primary | ICD-10-CM

## 2024-02-20 DIAGNOSIS — R30.0 DYSURIA: ICD-10-CM

## 2024-02-20 DIAGNOSIS — R05.1 ACUTE COUGH: ICD-10-CM

## 2024-02-20 DIAGNOSIS — R55 PRE-SYNCOPE: ICD-10-CM

## 2024-02-20 DIAGNOSIS — Z11.52 ENCOUNTER FOR SCREENING FOR COVID-19: ICD-10-CM

## 2024-02-20 LAB
BACTERIA #/AREA URNS HPF: ABNORMAL /HPF
HYALINE CASTS #/AREA URNS LPF: ABNORMAL /LPF
MUCOUS THREADS #/AREA URNS LPF: PRESENT /LPF
RBC #/AREA URNS AUTO: ABNORMAL /HPF
SQUAMOUS #/AREA URNS AUTO: ABNORMAL /LPF
WBC #/AREA URNS AUTO: ABNORMAL /HPF

## 2024-02-20 PROCEDURE — 99214 OFFICE O/P EST MOD 30 MIN: CPT | Performed by: PHYSICIAN ASSISTANT

## 2024-02-20 PROCEDURE — 81015 MICROSCOPIC EXAM OF URINE: CPT | Performed by: PHYSICIAN ASSISTANT

## 2024-02-20 RX ORDER — ONDANSETRON 4 MG/1
4 TABLET, ORALLY DISINTEGRATING ORAL EVERY 8 HOURS PRN
Status: CANCELLED | OUTPATIENT
Start: 2024-02-20

## 2024-02-20 RX ORDER — ONDANSETRON 4 MG/1
4 TABLET, ORALLY DISINTEGRATING ORAL 3 TIMES DAILY PRN
Qty: 15 TABLET | Refills: 0 | Status: SHIPPED | OUTPATIENT
Start: 2024-02-20 | End: 2024-02-25

## 2024-02-20 NOTE — ED TRIAGE NOTES
6 week history of URI symptoms. Reports cough and URI symptoms. Has not seen a provider until today and went to walk in clinic. Covid test done there with results pending and sent here. States has been progressively worse over the last 2 days. Productive cough with yellow sputum initially and now starting to be clearer. Able to speak in full complete sentences. Endorses feeling weak and has been having chills History of prostate cancer but is not on any chemo     Triage Assessment (Adult)       Row Name 02/20/24 1146          Triage Assessment    Airway WDL WDL        Respiratory WDL    Respiratory WDL X;cough     Cough Type productive  yellow to clear

## 2024-02-20 NOTE — PROGRESS NOTES
Patient presents with:  Cough: States has had cough for 6 weeks was taking Mucin ex   UTI: Has burning and frequency voiding since yesterday      Clinical Decision Making:  Patient is having weakness nausea and vomiting and vomiting in the office encounter.  Patient is sent to the next higher level of care for evaluation of the weakness and vomiting.  Questions were answered to his satisfaction for discharge.      ICD-10-CM    1. Nausea and vomiting, unspecified vomiting type  R11.2 ondansetron (ZOFRAN ODT) 4 MG ODT tab      2. Dysuria  R30.0 UA Microscopic with Reflex to Culture     CANCELED: UA Macroscopic with reflex to Microscopic and Culture - Clinic Collect      3. Acute cough  R05.1 CANCELED: Symptomatic Influenza A/B, RSV, & SARS-CoV2 PCR (COVID-19) Nose      4. Encounter for screening for COVID-19  Z11.52 CANCELED: Symptomatic Influenza A/B, RSV, & SARS-CoV2 PCR (COVID-19) Nose      5. Pre-syncope  R55           Patient Instructions   Follow-up with the ER for definitive evaluation and treatment        HPI:  Sage Jones is a 73 year old male who presents today for evaluation of possible urinary tract infection.  Patient states he has had burning with urination urinary frequency since yesterday.  Patient states he is going every 10 minutes.  He has difficulty with discomfort while urinating.  There is been no reported hesitancy urgency flank or back pain or gross hematuria.  Patient has no obstructive symptoms with no dribbling or weak stream.  Has not had increased nocturia.  No reported fever chills night sweats shortness of breath loss of taste or smell.  However, the patient has been very weak.  At this time he has not had syncope or presyncope.    History obtained from chart review and the patient.    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    Vitals:    02/20/24 1029   BP: (!) 151/82   Pulse: 80   Resp: 18   Temp: 97.4  F (36.3  C)   TempSrc: Oral   SpO2: 96%       General: Patient is resting comfortably no acute distress is afebrile  Patient appears fatigued and weak and is presenting nonambulatory in a wheelchair.  His wife is helping him into the office encounter.  HEENT: Head is normocephalic atraumatic   eyes are PERRL EOMI sclera anicteric   TMs are clear bilaterally  Abdomen: No suprapubic induration no CVA tenderness to percussion.  Skin: Without rash non-diaphoretic    Physical Exam      Labs:  Results for orders placed or performed in visit on 02/20/24   UA Microscopic with Reflex to Culture     Status: Abnormal   Result Value Ref Range    Bacteria Urine Moderate (A) None Seen /HPF    RBC Urine 2-5 (A) 0-2 /HPF /HPF    WBC Urine 0-5 0-5 /HPF /HPF    Squamous Epithelials Urine Few (A) None Seen /LPF    Mucus Urine Present (A) None Seen /LPF    Hyaline Casts Urine 0-2 (A) None Seen /LPF    Narrative    Urine Culture not indicated       At the end of the encounter, I discussed results, diagnosis, medications. Discussed red flags for immediate return to clinic/ER, as well as indications for follow up if no improvement. Patient understood and agreed to plan. Patient was stable for discharge.

## 2024-02-23 ENCOUNTER — NURSE TRIAGE (OUTPATIENT)
Dept: NURSING | Facility: CLINIC | Age: 74
End: 2024-02-23
Payer: COMMERCIAL

## 2024-02-24 NOTE — TELEPHONE ENCOUNTER
Nurse Triage SBAR    Is this a 2nd Level Triage? NO    Situation: Pt calling with concerns for dysuria.    Background: Pt was seen in the UC on 2/20 for a questionable UTI. He was advised to go to the ER. Pt went to the ER but left before being evaluated.     Assessment: Pt believes his potential UTI had gotten better but symptoms started again tonight. Symptoms include dysuria. Pt took an AZO pill which dissipated the pain. Denies fever, hematuria, and vomiting.     Protocol Recommended Disposition:   See PCP Within 24 Hours    Recommendation: Recommendation to be seen in the UC within the next 24 hours. Advised pt not to take any more AZO until he is evaluated as the last time he was seen he had taken it and the UA was canceled presumably because of this.    Reason for Disposition   All other males with painful urination    Additional Information   Negative: Shock suspected (e.g., cold/pale/clammy skin, too weak to stand, low BP, rapid pulse)   Negative: Sounds like a life-threatening emergency to the triager   Negative: [1] Unable to urinate (or only a few drops) > 4 hours AND [2] bladder feels very full (e.g., feels blocked with strong urge to urinate; palpable bladder)   Negative: Patient sounds very sick or weak to the triager   Negative: Vomiting   Negative: Fever > 100.4 F (38.0 C)   Negative: Swollen scrotum   Negative: [1] Constant pain in scrotum or testicle AND [2] present > 1 hour   Negative: Bedridden (e.g., CVA, chronic illness, recovering from surgery)   Negative: Diabetes mellitus or weak immune system (e.g., HIV positive, cancer chemo, splenectomy, organ transplant, chronic steroids)   Negative: Artificial heart valve or artificial joint   Negative: Side (flank) or lower back pain present   Negative: Blood in urine (red, pink, or tea-colored)    Protocols used: Urination Pain - Male-ROB-ALEX Gardner RN  [facility] Nurse Advisor   2/23/2024  8:49 PM

## 2024-02-26 ENCOUNTER — APPOINTMENT (OUTPATIENT)
Dept: CT IMAGING | Facility: CLINIC | Age: 74
End: 2024-02-26
Attending: EMERGENCY MEDICINE
Payer: COMMERCIAL

## 2024-02-26 ENCOUNTER — APPOINTMENT (OUTPATIENT)
Dept: MRI IMAGING | Facility: CLINIC | Age: 74
End: 2024-02-26
Attending: EMERGENCY MEDICINE
Payer: COMMERCIAL

## 2024-02-26 ENCOUNTER — APPOINTMENT (OUTPATIENT)
Dept: RADIOLOGY | Facility: CLINIC | Age: 74
End: 2024-02-26
Attending: EMERGENCY MEDICINE
Payer: COMMERCIAL

## 2024-02-26 ENCOUNTER — HOSPITAL ENCOUNTER (EMERGENCY)
Facility: CLINIC | Age: 74
Discharge: HOME | End: 2024-02-26
Attending: EMERGENCY MEDICINE | Admitting: EMERGENCY MEDICINE
Payer: COMMERCIAL

## 2024-02-26 VITALS
TEMPERATURE: 96.7 F | BODY MASS INDEX: 28.88 KG/M2 | SYSTOLIC BLOOD PRESSURE: 130 MMHG | HEART RATE: 75 BPM | OXYGEN SATURATION: 97 % | RESPIRATION RATE: 18 BRPM | WEIGHT: 195 LBS | DIASTOLIC BLOOD PRESSURE: 58 MMHG | HEIGHT: 69 IN

## 2024-02-26 DIAGNOSIS — R05.1 ACUTE COUGH: ICD-10-CM

## 2024-02-26 DIAGNOSIS — J40 BRONCHITIS: ICD-10-CM

## 2024-02-26 DIAGNOSIS — E86.0 DEHYDRATION: ICD-10-CM

## 2024-02-26 LAB
ALBUMIN UR-MCNC: NEGATIVE MG/DL
ANION GAP SERPL CALCULATED.3IONS-SCNC: 16 MMOL/L (ref 7–15)
APPEARANCE UR: CLEAR
APTT PPP: 28 SECONDS (ref 22–38)
ATRIAL RATE - MUSE: 73 BPM
BASOPHILS # BLD AUTO: 0 10E3/UL (ref 0–0.2)
BASOPHILS NFR BLD AUTO: 0 %
BILIRUB UR QL STRIP: NEGATIVE
BUN SERPL-MCNC: 11.3 MG/DL (ref 8–23)
CALCIUM SERPL-MCNC: 8.4 MG/DL (ref 8.8–10.2)
CHLORIDE SERPL-SCNC: 100 MMOL/L (ref 98–107)
COLOR UR AUTO: YELLOW
CREAT SERPL-MCNC: 0.99 MG/DL (ref 0.67–1.17)
DEPRECATED HCO3 PLAS-SCNC: 19 MMOL/L (ref 22–29)
DIASTOLIC BLOOD PRESSURE - MUSE: 57 MMHG
EGFRCR SERPLBLD CKD-EPI 2021: 80 ML/MIN/1.73M2
EOSINOPHIL # BLD AUTO: 0.8 10E3/UL (ref 0–0.7)
EOSINOPHIL NFR BLD AUTO: 11 %
ERYTHROCYTE [DISTWIDTH] IN BLOOD BY AUTOMATED COUNT: 13.2 % (ref 10–15)
FLUAV RNA SPEC QL NAA+PROBE: NEGATIVE
FLUBV RNA RESP QL NAA+PROBE: NEGATIVE
GLUCOSE BLDC GLUCOMTR-MCNC: 112 MG/DL (ref 70–99)
GLUCOSE SERPL-MCNC: 109 MG/DL (ref 70–99)
GLUCOSE UR STRIP-MCNC: NEGATIVE MG/DL
HCT VFR BLD AUTO: 32.8 % (ref 40–53)
HGB BLD-MCNC: 11.6 G/DL (ref 13.3–17.7)
HGB UR QL STRIP: NEGATIVE
IMM GRANULOCYTES # BLD: 0 10E3/UL
IMM GRANULOCYTES NFR BLD: 1 %
INR PPP: 1.02 (ref 0.85–1.15)
INTERPRETATION ECG - MUSE: NORMAL
KETONES UR STRIP-MCNC: NEGATIVE MG/DL
LACTATE SERPL-SCNC: 1.6 MMOL/L (ref 0.7–2)
LACTATE SERPL-SCNC: 2.1 MMOL/L (ref 0.7–2)
LEUKOCYTE ESTERASE UR QL STRIP: NEGATIVE
LYMPHOCYTES # BLD AUTO: 0.7 10E3/UL (ref 0.8–5.3)
LYMPHOCYTES NFR BLD AUTO: 10 %
MCH RBC QN AUTO: 32 PG (ref 26.5–33)
MCHC RBC AUTO-ENTMCNC: 35.4 G/DL (ref 31.5–36.5)
MCV RBC AUTO: 91 FL (ref 78–100)
MONOCYTES # BLD AUTO: 0.6 10E3/UL (ref 0–1.3)
MONOCYTES NFR BLD AUTO: 9 %
NEUTROPHILS # BLD AUTO: 4.8 10E3/UL (ref 1.6–8.3)
NEUTROPHILS NFR BLD AUTO: 69 %
NITRATE UR QL: NEGATIVE
NRBC # BLD AUTO: 0 10E3/UL
NRBC BLD AUTO-RTO: 0 /100
P AXIS - MUSE: 62 DEGREES
PH UR STRIP: 5.5 [PH] (ref 5–7)
PLATELET # BLD AUTO: 179 10E3/UL (ref 150–450)
POTASSIUM SERPL-SCNC: 3.5 MMOL/L (ref 3.4–5.3)
PR INTERVAL - MUSE: 208 MS
PROCALCITONIN SERPL IA-MCNC: 0.21 NG/ML
QRS DURATION - MUSE: 110 MS
QT - MUSE: 430 MS
QTC - MUSE: 473 MS
R AXIS - MUSE: 37 DEGREES
RBC # BLD AUTO: 3.62 10E6/UL (ref 4.4–5.9)
RBC URINE: <1 /HPF
RSV RNA SPEC NAA+PROBE: NEGATIVE
SARS-COV-2 RNA RESP QL NAA+PROBE: NEGATIVE
SODIUM SERPL-SCNC: 135 MMOL/L (ref 135–145)
SP GR UR STRIP: 1.02 (ref 1–1.03)
SYSTOLIC BLOOD PRESSURE - MUSE: 124 MMHG
T AXIS - MUSE: 49 DEGREES
TROPONIN T SERPL HS-MCNC: 21 NG/L
UROBILINOGEN UR STRIP-MCNC: <2 MG/DL
VENTRICULAR RATE- MUSE: 73 BPM
WBC # BLD AUTO: 6.9 10E3/UL (ref 4–11)
WBC URINE: 2 /HPF

## 2024-02-26 PROCEDURE — 87637 SARSCOV2&INF A&B&RSV AMP PRB: CPT | Performed by: EMERGENCY MEDICINE

## 2024-02-26 PROCEDURE — 84145 PROCALCITONIN (PCT): CPT | Performed by: EMERGENCY MEDICINE

## 2024-02-26 PROCEDURE — 71046 X-RAY EXAM CHEST 2 VIEWS: CPT

## 2024-02-26 PROCEDURE — 82962 GLUCOSE BLOOD TEST: CPT

## 2024-02-26 PROCEDURE — 99446 NTRPROF PH1/NTRNET/EHR 5-10: CPT | Performed by: STUDENT IN AN ORGANIZED HEALTH CARE EDUCATION/TRAINING PROGRAM

## 2024-02-26 PROCEDURE — 85730 THROMBOPLASTIN TIME PARTIAL: CPT | Performed by: EMERGENCY MEDICINE

## 2024-02-26 PROCEDURE — 93005 ELECTROCARDIOGRAM TRACING: CPT | Performed by: EMERGENCY MEDICINE

## 2024-02-26 PROCEDURE — 82565 ASSAY OF CREATININE: CPT | Performed by: EMERGENCY MEDICINE

## 2024-02-26 PROCEDURE — 250N000011 HC RX IP 250 OP 636: Performed by: EMERGENCY MEDICINE

## 2024-02-26 PROCEDURE — 255N000002 HC RX 255 OP 636: Performed by: EMERGENCY MEDICINE

## 2024-02-26 PROCEDURE — 84484 ASSAY OF TROPONIN QUANT: CPT | Performed by: EMERGENCY MEDICINE

## 2024-02-26 PROCEDURE — 81003 URINALYSIS AUTO W/O SCOPE: CPT | Performed by: EMERGENCY MEDICINE

## 2024-02-26 PROCEDURE — 83605 ASSAY OF LACTIC ACID: CPT | Performed by: EMERGENCY MEDICINE

## 2024-02-26 PROCEDURE — 70553 MRI BRAIN STEM W/O & W/DYE: CPT

## 2024-02-26 PROCEDURE — 70450 CT HEAD/BRAIN W/O DYE: CPT

## 2024-02-26 PROCEDURE — 85610 PROTHROMBIN TIME: CPT | Performed by: EMERGENCY MEDICINE

## 2024-02-26 PROCEDURE — 85048 AUTOMATED LEUKOCYTE COUNT: CPT | Performed by: EMERGENCY MEDICINE

## 2024-02-26 PROCEDURE — 87040 BLOOD CULTURE FOR BACTERIA: CPT | Performed by: EMERGENCY MEDICINE

## 2024-02-26 PROCEDURE — 36415 COLL VENOUS BLD VENIPUNCTURE: CPT | Performed by: EMERGENCY MEDICINE

## 2024-02-26 PROCEDURE — 99285 EMERGENCY DEPT VISIT HI MDM: CPT | Mod: 25

## 2024-02-26 PROCEDURE — 70496 CT ANGIOGRAPHY HEAD: CPT

## 2024-02-26 PROCEDURE — A9585 GADOBUTROL INJECTION: HCPCS | Performed by: EMERGENCY MEDICINE

## 2024-02-26 PROCEDURE — 82374 ASSAY BLOOD CARBON DIOXIDE: CPT | Performed by: EMERGENCY MEDICINE

## 2024-02-26 RX ORDER — GADOBUTROL 604.72 MG/ML
9 INJECTION INTRAVENOUS ONCE
Status: COMPLETED | OUTPATIENT
Start: 2024-02-26 | End: 2024-02-26

## 2024-02-26 RX ORDER — PREDNISONE 20 MG/1
TABLET ORAL
Qty: 10 TABLET | Refills: 0 | Status: SHIPPED | OUTPATIENT
Start: 2024-02-26 | End: 2024-04-02

## 2024-02-26 RX ORDER — ALBUTEROL SULFATE 90 UG/1
2 AEROSOL, METERED RESPIRATORY (INHALATION) EVERY 6 HOURS PRN
Qty: 18 G | Refills: 0 | Status: SHIPPED | OUTPATIENT
Start: 2024-02-26 | End: 2024-04-02

## 2024-02-26 RX ORDER — DOXYCYCLINE 100 MG/1
100 CAPSULE ORAL 2 TIMES DAILY
Qty: 14 CAPSULE | Refills: 0 | Status: SHIPPED | OUTPATIENT
Start: 2024-02-26 | End: 2024-03-04

## 2024-02-26 RX ORDER — IOPAMIDOL 755 MG/ML
75 INJECTION, SOLUTION INTRAVASCULAR ONCE
Status: COMPLETED | OUTPATIENT
Start: 2024-02-26 | End: 2024-02-26

## 2024-02-26 RX ADMIN — IOPAMIDOL 75 ML: 755 INJECTION, SOLUTION INTRAVENOUS at 10:45

## 2024-02-26 RX ADMIN — GADOBUTROL 9 ML: 604.72 INJECTION INTRAVENOUS at 14:14

## 2024-02-26 ASSESSMENT — ACTIVITIES OF DAILY LIVING (ADL)
ADLS_ACUITY_SCORE: 35

## 2024-02-26 ASSESSMENT — ENCOUNTER SYMPTOMS
DYSURIA: 0
CONFUSION: 1
SPEECH DIFFICULTY: 1
FATIGUE: 1

## 2024-02-26 ASSESSMENT — COLUMBIA-SUICIDE SEVERITY RATING SCALE - C-SSRS
2. HAVE YOU ACTUALLY HAD ANY THOUGHTS OF KILLING YOURSELF IN THE PAST MONTH?: NO
1. IN THE PAST MONTH, HAVE YOU WISHED YOU WERE DEAD OR WISHED YOU COULD GO TO SLEEP AND NOT WAKE UP?: NO
6. HAVE YOU EVER DONE ANYTHING, STARTED TO DO ANYTHING, OR PREPARED TO DO ANYTHING TO END YOUR LIFE?: NO

## 2024-02-26 NOTE — CONSULTS
[facility]    Stroke Telephone Note    I was called by Nba Moore on 02/26/24 regarding patient Sage Jones. The patient is a 73 year old male with history of HTN who presented with slurry speech. LKW 7 AM when he briefly woke up and went back to bed. He woke up again around 8:30 AM and his wife noticed mild slurry speech. He also complains of cough and fatigue over the last few weeks. He is AAO x 3, no drift in all extremities and has no aphasia. No focal deficits on exam otherwise.     Vitals  BP: 127/59   Pulse: 62   Resp: 18   Temp: (!) 96.7  F (35.9  C)   Weight: 88.5 kg (195 lb)    Stroke Code Data (for stroke code without tele)  Stroke code activated 02/26/24  1046   Stroke provider first response 02/26/24  1048   Last known normal 02/26/24  0700      Time of discovery (or onset of symptoms) 02/26/24  0845   Head CT read by Stroke Neuro Provider 02/26/24  1052   Was stroke code de-escalated? Yes  02/26/24  1052     Imaging Findings  CT head: No acute changes  CTA head/neck: No LVO. ICAD predominantly in bilateral PCAs    Intravenous Thrombolysis  Not given due to:   - minor/isolated/quickly resolving symptoms  - stroke mimic: toxic/metabolic causes    Endovascular Treatment  Not initiated due to absence of proximal vessel occlusion    Impression  Dysarthria - R/o acute infarcts    Recommendations   Stat MRI Brain wwo contrast - if acute infarct noted, please call us back for further recommendations. Toxic/metabolic workup per ED.     My recommendations are based on the information provided over the phone by Sage Jones's in-person providers. They are not intended to replace the clinical judgment of his in-person providers. I was not requested to personally see or examine the patient at this time.     I personally spent a total of 7 minutes on February 26, 2024 consulting with his  medical providers and coordinating care.  This includes personally reviewing the  "patient's medical record including diagnostic testing, neuroimaging, and laboratory studies.    Rohan Verma MD       Department of Neurology       Securely message with the [employer] Web Console (learn more here)   To page me or covering stroke neurology team member, click here: AMCOM  Choose \"On Call\" tab at top, then select \"NEUROLOGY/ALL SITES\" from middle drop-down box, press Enter, then look for \"stroke\" or \"telestroke\" for your site.        "

## 2024-02-26 NOTE — ED PROVIDER NOTES
EMERGENCY DEPARTMENT ENCOUNTER      NAME: Sage Jones  AGE: 73 year old male  YOB: 1950  MRN: [id number]  EVALUATION DATE & TIME: No admission date for patient encounter.    PCP: William Meng    ED PROVIDER: Nba Moore MD      Chief Complaint   Patient presents with    Slurred Speech     FINAL IMPRESSION:  1. Acute cough    2. Bronchitis    3. Dehydration      ED COURSE & MEDICAL DECISION MAKING:    Pertinent Labs & Imaging studies reviewed. (See chart for details)  73 year old male presents to the Emergency Department for evaluation of slurred speech and generalized weakness.  Patient is has been suffering from a cough and general malaise for the last several weeks.  Also some urinary symptoms.  This morning when patient awoke he still had his upper respiratory symptoms and some generalized fatigue and weakness but wife reporting there was no slurred speech she went back to sleep at 8 AM and after that period of rest at 845 she woke up and noted what she describes as slurred speech.  I assessed the patient on in triage due to our stroke code process.  There was some very mild dysarthria by clinical examination the wife reporting that his speech is altered compared to his baseline there was no aphasia he was answering questions appropriately with appropriate responses and appropriate recollection.  He is in fact denying that his speech is altered.  No other cranial nerve deficits appreciated by clinical examination his upper and lower extremity examination was normal I did not ambulate the patient because he was complaining of generalized weakness and there is generalized weakness necessitated a wheelchair transport from his vehicle to the emergency department.  Based on the timing of his symptoms I did out of an abundance of caution initiated a tier 1 stroke code.  My initial assessment was that this was more likely to be infectious or metabolic related as opposed to acute CVA based  on the other symptoms that the patient has been dealing with over the last several weeks.  The initial CT CTA was negative for acute process.  Patient was discussed with the stroke neurology team.  They recommended de-escalation of the stroke code with proceeding with MRI imaging for additional assessment while it we will pursue alternative pathology such as pneumonia or urinary tract infection or sepsis.  That testing has been initiated and we await additional ED diagnostics    3:04 PM  Chest x-ray was negative.  Patient felt improved after interventions in the emergency department.  His lactic acid corrected.  Still with a cough and borderline lower oxygen with O2 sats 92% on room air.  His pulmonary examination outside of his cough is otherwise unremarkable.  Suspect bronchitis with a component of dehydration as the primary driving factor of his symptoms.  No stroke on MRI.  I think patient appropriate for discharge in light of his otherwise negative workup and his clinical improvement.  Patient family are comfortable this plan of care I reviewed return precautions prior to discharge.         10:23 AM I met with the patient in triage, obtained history, performed an initial exam, and discussed options and plan for diagnostics and treatment here in the ED.   10:49 AM I spoke with stroke neurology.  12:14 PM I rechecked and updated the patient     Medical Decision Making  Obtained supplemental history:Supplemental history obtained?: Documented in chart and Family Member/Significant Other  Reviewed external records: External records reviewed?: Documented in chart and Outpatient Record: Urgent care urinalysis from earlier this week  Care impacted by chronic illness:Hyperlipidemia  Care significantly affected by social determinants of health:N/A  Did you consider but not order tests?: Work up considered but not performed and documented in chart, if applicable  Did you interpret images independently?: Independent  "interpretation of ECG and images noted in documentation, when applicable.  Consultation discussion with other provider:Did you involve another provider (consultant, , pharmacy, etc.)?: I discussed the care with another health care provider, see documentation for details.  Discharge. I prescribed additional prescription strength medication(s) as charted. I considered admission, but discharged patient after significant clinical improvement.    At the conclusion of the encounter I discussed the results of all of the tests and the disposition. The questions were answered. The patient or family acknowledged understanding and was agreeable with the care plan.         MEDICATIONS GIVEN IN THE EMERGENCY:  Medications   iopamidol (ISOVUE-370) solution 75 mL (75 mLs Intravenous $Given 2/26/24 1045)   gadobutrol (GADAVIST) injection 9 mL (9 mLs Intravenous $Given 2/26/24 1414)       NEW PRESCRIPTIONS STARTED AT TODAY'S ER VISIT  New Prescriptions    ALBUTEROL (PROAIR HFA/PROVENTIL HFA/VENTOLIN HFA) 108 (90 BASE) MCG/ACT INHALER    Inhale 2 puffs into the lungs every 6 hours as needed for shortness of breath, wheezing or cough    DOXYCYCLINE HYCLATE (VIBRAMYCIN) 100 MG CAPSULE    Take 1 capsule (100 mg) by mouth 2 times daily for 7 days    PREDNISONE (DELTASONE) 20 MG TABLET    Take two tablets (= 40mg) each day for 5 (five) days          =================================================================    HPI    Patient information was obtained from: Patient and wife    Use of : N/A          Sage Jones is a 73 year old male with a pertinent history of HTN who presents to this ED via walk-in for evaluation of slurred speech and memory loss.    Per wife, she initially woke up the patient at 0700 today, the patient seemed normal but tired, so he went back to sleep. She woke him up again around 0815, but had difficulty waking him, his speech seemed slurred, he appeared \"dumbfounded,\" and he was slow compared to " "normal. He did not know what day it is. She reports Monday (1 week ago), they were concerned the patient had a bladder infection. They went to Urgent Care on Tuesday, and needed a wheel chair due to progressive weakness. His urine was red due to a medication that he is taking. He had vomiting there and was told he had to present to the ED. Not on an antibiotic currently.    Patient reports he has had a cold for 8 weeks. Continues to have a cough and lethargy. He feels his speech is improved but his wife states his speech is not back to baseline. Patient notes he has had COVID twice. No other current complaints.      REVIEW OF SYSTEMS   Review of Systems   Constitutional:  Positive for fatigue.   Genitourinary:  Negative for dysuria.   Neurological:  Positive for speech difficulty.   Psychiatric/Behavioral:  Positive for confusion.          PAST MEDICAL HISTORY:  Past Medical History:   Diagnosis Date    Mumps        PAST SURGICAL HISTORY:  History reviewed. No pertinent surgical history.        CURRENT MEDICATIONS:    albuterol (PROAIR HFA/PROVENTIL HFA/VENTOLIN HFA) 108 (90 Base) MCG/ACT inhaler  doxycycline hyclate (VIBRAMYCIN) 100 MG capsule  predniSONE (DELTASONE) 20 MG tablet  amLODIPine (NORVASC) 5 MG tablet  ascorbic acid, vitamin C, (VITAMIN C) 1000 MG tablet  b complex vitamins tablet  CALCIUM CITRATE PO  LEUPROLIDE ACETATE IM  loratadine (CLARITIN) 10 mg tablet  tamsulosin (FLOMAX) 0.4 MG capsule        ALLERGIES:  No Known Allergies    FAMILY HISTORY:  History reviewed. No pertinent family history.    SOCIAL HISTORY:   Social History     Socioeconomic History    Marital status:    Tobacco Use    Smoking status: Never    Smokeless tobacco: Never   Vaping Use    Vaping Use: Never used       VITALS:  /62   Pulse 69   Temp (!) 96.7  F (35.9  C)   Resp 18   Ht 1.753 m (5' 9\")   Wt 88.5 kg (195 lb)   SpO2 92%   BMI 28.80 kg/m      PHYSICAL EXAM    PHYSICAL EXAM    Constitutional: Well " developed, Well nourished, appears fatigued  HENT: Normocephalic, Atraumatic, Bilateral external ears normal, Oropharynx normal, mucous membranes moist, Nose normal. Neck-  Normal range of motion, No tenderness, Supple, No stridor.   Eyes: PERRL, EOMI, Conjunctiva normal, No discharge.   Respiratory: Normal breath sounds, No respiratory distress, No wheezing, Speaks full sentences easily. No cough.   Cardiovascular: Normal heart rate, Regular rhythm, No murmurs Chest wall nontender.    GI:  Soft, No tenderness, No masses, No flank tenderness. No rebound or guarding.  : No cva tenderness    Musculoskeletal: 2+ DP pulses. No edema. No cyanosis. Good range of motion in all major joints. No tenderness to palpation. No tenderness of the CTLS spine.   Integument: Warm, Dry, No erythema, No rash. No petechiae.   Neurologic: Alert & oriented x 3, patient has 5 out of 5 flexion extension at the wrist and elbow he is able to keep his arms extended with no drift appreciated on either side.  There is mild dysarthria but overall his speech pattern is clearly discernible and there is no evidence of aphasia his word selection in response to my questions is appropriate.  He is oriented to person place and time.  Likewise lower extremity examination without focal deficits.  He does have generalized weakness.  Psychiatric: Affect normal, Judgment normal, Mood normal. Cooperative.        LAB:  All pertinent labs reviewed and interpreted.  Results for orders placed or performed during the hospital encounter of 02/26/24   CT Head w/o Contrast    Impression    CONCLUSION:  HEAD CT:  No intracranial hemorrhage, mass, or definite CT evidence of recent ischemia.    HEAD CTA:  1.  Mild to moderate intracranial atherosclerosis most pronounced involving the proximal right posterior cerebral artery.   2.  No proximal large vessel occlusion or aneurysm.    NECK CTA:  Minor carotid artery atherosclerosis. No dissection or hemodynamically  significant narrowing in the neck by NASCET criteria.    Findings were discussed with Dr. SYLVIA GUERRERO via telephone at 1055 hours on 02/26/2024.    CTA Head Neck with Contrast    Impression    CONCLUSION:  HEAD CT:  No intracranial hemorrhage, mass, or definite CT evidence of recent ischemia.    HEAD CTA:  1.  Mild to moderate intracranial atherosclerosis most pronounced involving the proximal right posterior cerebral artery.   2.  No proximal large vessel occlusion or aneurysm.    NECK CTA:  Minor carotid artery atherosclerosis. No dissection or hemodynamically significant narrowing in the neck by NASCET criteria.    Findings were discussed with Dr. SYLVIA GUERRERO via telephone at 1055 hours on 02/26/2024.    Chest XR,  PA & LAT    Impression    IMPRESSION: Negative chest.   MR Brain w/o & w Contrast    Impression    IMPRESSION:  1.  No acute intracranial finding. No evidence for recent ischemia, intracranial hemorrhage, or mass.     Glucose by meter   Result Value Ref Range    GLUCOSE BY METER POCT 112 (H) 70 - 99 mg/dL   Basic metabolic panel   Result Value Ref Range    Sodium 135 135 - 145 mmol/L    Potassium 3.5 3.4 - 5.3 mmol/L    Chloride 100 98 - 107 mmol/L    Carbon Dioxide (CO2) 19 (L) 22 - 29 mmol/L    Anion Gap 16 (H) 7 - 15 mmol/L    Urea Nitrogen 11.3 8.0 - 23.0 mg/dL    Creatinine 0.99 0.67 - 1.17 mg/dL    GFR Estimate 80 >60 mL/min/1.73m2    Calcium 8.4 (L) 8.8 - 10.2 mg/dL    Glucose 109 (H) 70 - 99 mg/dL   Result Value Ref Range    INR 1.02 0.85 - 1.15   Partial thromboplastin time   Result Value Ref Range    aPTT 28 22 - 38 Seconds   Result Value Ref Range    Troponin T, High Sensitivity 21 <=22 ng/L   Symptomatic Influenza A/B, RSV, & SARS-CoV2 PCR (COVID-19) Nasopharyngeal    Specimen: Nasopharyngeal; Swab   Result Value Ref Range    Influenza A PCR Negative Negative    Influenza B PCR Negative Negative    RSV PCR Negative Negative    SARS CoV2 PCR Negative Negative   UA with  Microscopic reflex to Culture    Specimen: Urine, Clean Catch   Result Value Ref Range    Color Urine Yellow Colorless, Straw, Light Yellow, Yellow    Appearance Urine Clear Clear    Glucose Urine Negative Negative mg/dL    Bilirubin Urine Negative Negative    Ketones Urine Negative Negative mg/dL    Specific Gravity Urine 1.019 1.001 - 1.030    Blood Urine Negative Negative    pH Urine 5.5 5.0 - 7.0    Protein Albumin Urine Negative Negative mg/dL    Urobilinogen Urine <2.0 <2.0 mg/dL    Nitrite Urine Negative Negative    Leukocyte Esterase Urine Negative Negative    RBC Urine <1 <=2 /HPF    WBC Urine 2 <=5 /HPF   Lactic acid whole blood   Result Value Ref Range    Lactic Acid 2.1 (H) 0.7 - 2.0 mmol/L   CBC with platelets and differential   Result Value Ref Range    WBC Count 6.9 4.0 - 11.0 10e3/uL    RBC Count 3.62 (L) 4.40 - 5.90 10e6/uL    Hemoglobin 11.6 (L) 13.3 - 17.7 g/dL    Hematocrit 32.8 (L) 40.0 - 53.0 %    MCV 91 78 - 100 fL    MCH 32.0 26.5 - 33.0 pg    MCHC 35.4 31.5 - 36.5 g/dL    RDW 13.2 10.0 - 15.0 %    Platelet Count 179 150 - 450 10e3/uL    % Neutrophils 69 %    % Lymphocytes 10 %    % Monocytes 9 %    % Eosinophils 11 %    % Basophils 0 %    % Immature Granulocytes 1 %    NRBCs per 100 WBC 0 <1 /100    Absolute Neutrophils 4.8 1.6 - 8.3 10e3/uL    Absolute Lymphocytes 0.7 (L) 0.8 - 5.3 10e3/uL    Absolute Monocytes 0.6 0.0 - 1.3 10e3/uL    Absolute Eosinophils 0.8 (H) 0.0 - 0.7 10e3/uL    Absolute Basophils 0.0 0.0 - 0.2 10e3/uL    Absolute Immature Granulocytes 0.0 <=0.4 10e3/uL    Absolute NRBCs 0.0 10e3/uL   Lactic acid whole blood   Result Value Ref Range    Lactic Acid 1.6 0.7 - 2.0 mmol/L   Result Value Ref Range    Procalcitonin 0.21 <0.50 ng/mL   ECG 12-LEAD WITH MUSE ([facility])   Result Value Ref Range    Systolic Blood Pressure 124 mmHg    Diastolic Blood Pressure 57 mmHg    Ventricular Rate 73 BPM    Atrial Rate 73 BPM    NH Interval 208 ms    QRS Duration 110 ms     ms    QTc 473  ms    P Axis 62 degrees    R AXIS 37 degrees    T Axis 49 degrees    Interpretation ECG       Sinus rhythm with occasional Premature ventricular complexes  Otherwise normal ECG  When compared with ECG of 26-FEB-2024 10:59,  No significant change was found  Confirmed by SEE ED PROVIDER NOTE FOR, ECG INTERPRETATION ([id number]),  Edie Cristobal ([id number]) on 2/26/2024 2:59:56 PM         RADIOLOGY:  Reviewed all pertinent imaging. Please see official radiology report.  MR Brain w/o & w Contrast   Final Result   IMPRESSION:   1.  No acute intracranial finding. No evidence for recent ischemia, intracranial hemorrhage, or mass.         Chest XR,  PA & LAT   Final Result   IMPRESSION: Negative chest.      CTA Head Neck with Contrast   Final Result   CONCLUSION:   HEAD CT:   No intracranial hemorrhage, mass, or definite CT evidence of recent ischemia.      HEAD CTA:   1.  Mild to moderate intracranial atherosclerosis most pronounced involving the proximal right posterior cerebral artery.    2.  No proximal large vessel occlusion or aneurysm.      NECK CTA:   Minor carotid artery atherosclerosis. No dissection or hemodynamically significant narrowing in the neck by NASCET criteria.      Findings were discussed with Dr. SYLVIA GUERRERO via telephone at 1055 hours on 02/26/2024.       CT Head w/o Contrast   Final Result   CONCLUSION:   HEAD CT:   No intracranial hemorrhage, mass, or definite CT evidence of recent ischemia.      HEAD CTA:   1.  Mild to moderate intracranial atherosclerosis most pronounced involving the proximal right posterior cerebral artery.    2.  No proximal large vessel occlusion or aneurysm.      NECK CTA:   Minor carotid artery atherosclerosis. No dissection or hemodynamically significant narrowing in the neck by NASCET criteria.      Findings were discussed with Dr. SYLVIA GUERRERO via telephone at 1055 hours on 02/26/2024.           EKG:    Performed at: 11:10    Impression: sinus rhythm with occasional  premature ventricular complexes.    Rate: 73 bpm  Rhythm: sinus  Axis: 37  NM Interval: 208 ms  QRS Interval: 110 ms  QTc Interval: 473 ms  ST Changes: none  Comparison: No significant change found compared with ECG of 26-FEB-2024.    I have independently reviewed and interpreted the EKG(s) documented above.    I, Jessmichelle Tapia, am serving as a scribe to document services personally performed by Dr. Moore based on my observation and the provider's statements to me. I, Nba Moore MD, attest that Jess Tapia is acting in a scribe capacity, has observed my performance of the services and has documented them in accordance with my direction.     Nba Moore MD  [facility] EMERGENCY ROOM  [address] [id number] [phone]       Nba Moore MD  02/26/24 7611

## 2024-02-26 NOTE — ED TRIAGE NOTES
Pt brought in by wife with concern for slurred speech that started this am. Last known well was at 7 am. Wife woke him up at 0815 and noted Glucose in triage was 112 mg/dl.Pt coughing in triage. Has had it for about 8 weeks. Also being treated for bladder infection. Dr Moore saw pt in triage.

## 2024-03-02 LAB
BACTERIA BLD CULT: NO GROWTH
BACTERIA BLD CULT: NO GROWTH

## 2024-03-20 ENCOUNTER — OFFICE VISIT (OUTPATIENT)
Dept: FAMILY MEDICINE | Facility: CLINIC | Age: 74
End: 2024-03-20
Payer: COMMERCIAL

## 2024-03-20 VITALS
SYSTOLIC BLOOD PRESSURE: 121 MMHG | DIASTOLIC BLOOD PRESSURE: 60 MMHG | OXYGEN SATURATION: 96 % | TEMPERATURE: 97.8 F | BODY MASS INDEX: 29.86 KG/M2 | WEIGHT: 202.2 LBS | HEART RATE: 74 BPM | RESPIRATION RATE: 16 BRPM

## 2024-03-20 DIAGNOSIS — R05.3 CHRONIC COUGH: Primary | ICD-10-CM

## 2024-03-20 PROCEDURE — 99213 OFFICE O/P EST LOW 20 MIN: CPT | Performed by: FAMILY MEDICINE

## 2024-03-20 RX ORDER — RESPIRATORY SYNCYTIAL VIRUS VACCINE 120MCG/0.5
0.5 KIT INTRAMUSCULAR ONCE
Qty: 1 EACH | Refills: 0 | Status: CANCELLED | OUTPATIENT
Start: 2024-03-20 | End: 2024-03-20

## 2024-03-20 ASSESSMENT — PAIN SCALES - GENERAL: PAINLEVEL: NO PAIN (0)

## 2024-03-20 NOTE — PROGRESS NOTES
"  Assessment & Plan     Chronic cough  Patient has had a chronic cough for 2 months now dry keeping him up at night patient is fighting prostate cancer after having radiation therapy now is on Lupron shots about to have last injection next month.  He has been seen at various emergency room's and treated with antibiotics wheeze medicine cough medicine cold medicine.  He is now having some mild urinary tract symptoms passing his urine.  He is getting up at night 2-3 times.  He is under the care of [facility] urology who have seen him.  Essentially he is getting better during the 20 minutes of the face-to-face examination he only coughed about 8 times it was a dry cough.  His chest examination was clear.  My impression is that he is dry and is irritated and under hydrated.  He has no trouble passing his urine we will hydrate him and have him take some honey 1 teaspoon every 2 hours while he is awake.  I feel he is going to improve.  He got some relief from some leftover prednisone pills 5 mg which made him feel wonderful.  I do not want him to take this medication at this time and I explained the risk-benefit ratio of that.  He is accompanied by his les wife and they are doing quite well.            BMI  Estimated body mass index is 29.86 kg/m  as calculated from the following:    Height as of 2/26/24: 1.753 m (5' 9\").    Weight as of this encounter: 91.7 kg (202 lb 3.2 oz).             Krissy Villanueva is a 73 year old, presenting for the following health issues:  Chronic Cough (Ten weeks) and Urinary Pain (Three weeks)      3/20/2024     9:45 AM   Additional Questions   Roomed by Catalina   Accompanied by wife     HPI patient's been coughing for over 2 months and is now slowing down a bit he is here for my opinion on this please see the above note.  I do not think he needs any antibiotics or any further corticosteroids just hydration and natural antitussive therapy              Review of Systems  CONSTITUTIONAL: " NEGATIVE for fever, chills, change in weight  INTEGUMENTARY/SKIN: NEGATIVE for worrisome rashes, moles or lesions  EYES: NEGATIVE for vision changes or irritation  ENT/MOUTH: NEGATIVE for ear, mouth and throat problems  RESP: NEGATIVE for significant cough or SOB  BREAST: NEGATIVE for masses, tenderness or discharge  CV: NEGATIVE for chest pain, palpitations or peripheral edema  GI: NEGATIVE for nausea, abdominal pain, heartburn, or change in bowel habits  : NEGATIVE for frequency, dysuria, or hematuria  MUSCULOSKELETAL: NEGATIVE for significant arthralgias or myalgia  NEURO: NEGATIVE for weakness, dizziness or paresthesias  ENDOCRINE: NEGATIVE for temperature intolerance, skin/hair changes  HEME: NEGATIVE for bleeding problems  PSYCHIATRIC: NEGATIVE for changes in mood or affect      Objective    /60 (BP Location: Right arm, Patient Position: Sitting, Cuff Size: Adult Large)   Pulse 74   Temp 97.8  F (36.6  C) (Temporal)   Resp 16   Wt 91.7 kg (202 lb 3.2 oz)   SpO2 96%   BMI 29.86 kg/m    Body mass index is 29.86 kg/m .  Physical Exam   GENERAL: alert and no distress  NECK: no adenopathy, no asymmetry, masses, or scars  RESP: lungs clear to auscultation - no rales, rhonchi or wheezes  CV: regular rate and rhythm, normal S1 S2, no S3 or S4, no murmur, click or rub, no peripheral edema  ABDOMEN: soft, nontender, no hepatosplenomegaly, no masses and bowel sounds normal  MS: no gross musculoskeletal defects noted, no edema    Patient ambulates with a cane stroke was ruled out about 6 weeks ago        Signed Electronically by: Reed Rodriguez MD

## 2024-04-05 DIAGNOSIS — C61 PRIMARY MALIGNANT NEOPLASM OF PROSTATE (H): Primary | ICD-10-CM

## 2024-04-08 ENCOUNTER — OFFICE VISIT (OUTPATIENT)
Dept: FAMILY MEDICINE | Facility: CLINIC | Age: 74
End: 2024-04-08
Payer: COMMERCIAL

## 2024-04-08 VITALS
OXYGEN SATURATION: 99 % | SYSTOLIC BLOOD PRESSURE: 142 MMHG | BODY MASS INDEX: 30.23 KG/M2 | HEART RATE: 66 BPM | WEIGHT: 204.1 LBS | TEMPERATURE: 98.1 F | HEIGHT: 69 IN | DIASTOLIC BLOOD PRESSURE: 76 MMHG | RESPIRATION RATE: 18 BRPM

## 2024-04-08 DIAGNOSIS — Z92.3 S/P RADIATION THERAPY: ICD-10-CM

## 2024-04-08 DIAGNOSIS — R35.0 URINARY FREQUENCY: Primary | ICD-10-CM

## 2024-04-08 DIAGNOSIS — C61 PROSTATE CANCER (H): ICD-10-CM

## 2024-04-08 LAB
ALBUMIN UR-MCNC: 100 MG/DL
APPEARANCE UR: CLEAR
BACTERIA #/AREA URNS HPF: ABNORMAL /HPF
BILIRUB UR QL STRIP: NEGATIVE
COLOR UR AUTO: YELLOW
CREAT UR-MCNC: 178 MG/DL
GLUCOSE UR STRIP-MCNC: NEGATIVE MG/DL
HGB UR QL STRIP: ABNORMAL
KETONES UR STRIP-MCNC: ABNORMAL MG/DL
LEUKOCYTE ESTERASE UR QL STRIP: NEGATIVE
MICROALBUMIN UR-MCNC: 285 MG/L
MICROALBUMIN/CREAT UR: 160.11 MG/G CR (ref 0–17)
NITRATE UR QL: NEGATIVE
PH UR STRIP: 6 [PH] (ref 5–8)
RBC #/AREA URNS AUTO: ABNORMAL /HPF
SP GR UR STRIP: 1.02 (ref 1–1.03)
SQUAMOUS #/AREA URNS AUTO: ABNORMAL /LPF
UROBILINOGEN UR STRIP-ACNC: 0.2 E.U./DL
WBC #/AREA URNS AUTO: ABNORMAL /HPF

## 2024-04-08 PROCEDURE — 82043 UR ALBUMIN QUANTITATIVE: CPT | Performed by: FAMILY MEDICINE

## 2024-04-08 PROCEDURE — 82570 ASSAY OF URINE CREATININE: CPT | Performed by: FAMILY MEDICINE

## 2024-04-08 PROCEDURE — 99214 OFFICE O/P EST MOD 30 MIN: CPT | Performed by: FAMILY MEDICINE

## 2024-04-08 PROCEDURE — 81001 URINALYSIS AUTO W/SCOPE: CPT | Performed by: FAMILY MEDICINE

## 2024-04-08 ASSESSMENT — PAIN SCALES - GENERAL: PAINLEVEL: MODERATE PAIN (4)

## 2024-04-08 NOTE — PROGRESS NOTES
"Sage Jones  BP (!) 142/76   Pulse 66   Temp 98.1  F (36.7  C)   Resp 18   Ht 1.753 m (5' 9\")   Wt 92.6 kg (204 lb 1.6 oz)   SpO2 99%   BMI 30.14 kg/m       Assessment/Plan:                Rich was seen today for recheck medication, insomnia, urinary problem and uri.    Diagnoses and all orders for this visit:    Urinary frequency  -     UA Macroscopic with reflex to Microscopic and Culture - Lab Collect; Future  -     UA Macroscopic with reflex to Microscopic and Culture - Lab Collect  -     UA Microscopic with Reflex to Culture  -     Albumin Random Urine Quantitative with Creat Ratio    Prostate cancer (H)  -     Adult Urology  Referral; Future    S/P radiation therapy  -     Adult Urology  Referral; Future         DISCUSSION  The symptoms associated with respiratory infection are resolved.    There is no further concern regarding slurred speech or profound weakness as he had had that brought him into the emergency room in late February.    He does have ongoing urinary symptoms.  I suspect that this may be related to his radiation treatment.  I recommend consistently using tamsulosin 2 tablets nightly shortly after evening meal.  I recommend avoiding things that are irritating to the bladder which she has already started to do.  I do recommend we reestablish with a urologist so that we can have expert help in determining if there are any subtleties to his urinary system following his radiation treatment that may be impacting the symptoms.    Urine testing today does show 5-10 red blood cells a small amount of protein but no signs of infection high blood sugar or other similar concerns.  We will obtain urine microalbumin.  There is no indication for antibiotics at this time.   Subjective:     HPI:    Sage Jones is a 74 year old male is here today with his wife.    He has had a complicated course since approximately January with his overall health.    He has a history of " prostate cancer that was treated with radiation therapy at the [facility] last year.  He is following with oncology here locally.  He has had undetectable PSA testing.    In January developed a cough.  Took cough and cold medications.  Began to have more significant urinary symptoms.  This culminated in significant illness symptoms that were thought to perhaps be a stroke when he was evaluated in the emergency room in late February.  MRI scan and CT angiogram did not show any evidence of stroke or ischemia.  It was felt that his significant symptoms were related to a more significant illness.  He ultimately has improved tremendously.  He reports he is walking about 2-1/2 miles per day.  He has lost weight but trying to keep that off by being active and eating right.  He overall feels well.  He is still dealing with urinary symptoms that include urinary frequency getting up to go to the bathroom less than every 2 hours.  He reports occasional dysuria.  He has no gross hematuria.  He has not had any signs or symptoms of a urinary infection on urine testing prior.  He does report generally disrupted sleep because of the urinary symptoms.    At this point his cough is resolved and he is feeling back to normal.    He has not had any further episodes of slurred speech or becoming unresponsive as what brought him to the emergency room with the concern of stroke as described above.        ROS:  Complete review of systems is obtained.  Other than the specific considerations noted above complete review of systems is negative.          Objective:   Medications:  Current Outpatient Medications   Medication Sig Dispense Refill    amLODIPine (NORVASC) 5 MG tablet Take 1 tablet by mouth once daily 90 tablet 3    tamsulosin (FLOMAX) 0.4 MG capsule Take 2 capsules by mouth once daily 180 capsule 3    ascorbic acid, vitamin C, (VITAMIN C) 1000 MG tablet [ASCORBIC ACID, VITAMIN C, (VITAMIN C) 1000 MG TABLET] Take 1,000 mg by mouth  daily.      b complex vitamins tablet [B COMPLEX VITAMINS TABLET] Take 1 tablet by mouth daily.      CALCIUM CITRATE PO Take 650 mg by mouth daily      loratadine (CLARITIN) 10 mg tablet [LORATADINE (CLARITIN) 10 MG TABLET] Take 10 mg by mouth daily.       No current facility-administered medications for this visit.        Allergies:   No Known Allergies     Social History     Socioeconomic History    Marital status:      Spouse name: Not on file    Number of children: Not on file    Years of education: Not on file    Highest education level: Not on file   Occupational History    Not on file   Tobacco Use    Smoking status: Never    Smokeless tobacco: Never   Vaping Use    Vaping Use: Never used   Substance and Sexual Activity    Alcohol use: Not on file    Drug use: Not on file    Sexual activity: Not on file   Other Topics Concern    Not on file   Social History Narrative    Not on file     Social Determinants of Health     Financial Resource Strain: Not on file   Food Insecurity: Not on file   Transportation Needs: Not on file   Physical Activity: Not on file   Stress: Not on file   Social Connections: Not on file   Interpersonal Safety: Low Risk  (4/8/2024)    Interpersonal Safety     Do you feel physically and emotionally safe where you currently live?: Yes     Within the past 12 months, have you been hit, slapped, kicked or otherwise physically hurt by someone?: No     Within the past 12 months, have you been humiliated or emotionally abused in other ways by your partner or ex-partner?: No   Housing Stability: Not on file       No family history on file.     Most Recent Immunizations   Administered Date(s) Administered    COVID-19 12+ (2023-24) (MODERNA) 09/25/2023    COVID-19 Bivalent 12+ (Pfizer) 04/24/2023    COVID-19 MONOVALENT 12+ (Pfizer) 11/04/2021    COVID-19 Monovalent 12+ (Pfizer 2022) 04/08/2022    Influenza Vaccine 65+ (Fluzone HD) 09/25/2023    Pneumo Conj 13-V (2010&after) 07/30/2021     "Pneumococcal 20 valent Conjugate (Prevnar 20) 08/22/2023    TDAP (Adacel,Boostrix) 07/30/2021        Wt Readings from Last 3 Encounters:   04/08/24 92.6 kg (204 lb 1.6 oz)   03/20/24 91.7 kg (202 lb 3.2 oz)   02/26/24 88.5 kg (195 lb)        BP Readings from Last 6 Encounters:   04/08/24 (!) 142/76   03/20/24 121/60   02/26/24 130/58   02/20/24 (!) 150/71   02/20/24 (!) 151/82   01/15/24 133/75        No results found for: \"A1C\", \"HEMOGLOBINA1\"           PHYSICAL EXAM:    BP (!) 142/76   Pulse 66   Temp 98.1  F (36.7  C)   Resp 18   Ht 1.753 m (5' 9\")   Wt 92.6 kg (204 lb 1.6 oz)   SpO2 99%   BMI 30.14 kg/m           General Appearance:    Alert, cooperative, no distress   Eyes:   No scleral icterus or conjunctival irritation       Lungs:     Clear to auscultation bilaterally, respirations unlabored, no wheezes or crackles   Heart:    Regular rate and rhythm,  No murmur   Abdomen:    Soft, no distention, no tenderness on palpation, no masses, no organomegaly     Extremities:  No edema, no joint swelling or redness, no evidence of any injuries   Skin:  No concerning skin findings, no suspicious moles, no rashes   Neurologic:  On gross examination there is no motor or sensory deficit.  Patient walks with a normal gait                                     Answers submitted by the patient for this visit:  General Questionnaire (Submitted on 4/1/2024)  Chief Complaint: Chronic problems general questions HPI Form  What is the reason for your visit today? : wheezing and burning urination  How many servings of fruits and vegetables do you eat daily?: 4 or more  On average, how many sweetened beverages do you drink each day (Examples: soda, juice, sweet tea, etc.  Do NOT count diet or artificially sweetened beverages)?: 1  How many minutes a day do you exercise enough to make your heart beat faster?: 10 to 19  How many days a week do you exercise enough to make your heart beat faster?: 3 or less  How many days per week " do you miss taking your medication?: 0

## 2024-04-11 ENCOUNTER — LAB (OUTPATIENT)
Dept: INFUSION THERAPY | Facility: HOSPITAL | Age: 74
End: 2024-04-11
Attending: INTERNAL MEDICINE
Payer: COMMERCIAL

## 2024-04-11 DIAGNOSIS — R80.9 MICROALBUMINURIA: Primary | ICD-10-CM

## 2024-04-11 DIAGNOSIS — C61 PRIMARY MALIGNANT NEOPLASM OF PROSTATE (H): ICD-10-CM

## 2024-04-11 LAB
ALBUMIN SERPL BCG-MCNC: 4.1 G/DL (ref 3.5–5.2)
ALP SERPL-CCNC: 98 U/L (ref 40–150)
ALT SERPL W P-5'-P-CCNC: 12 U/L (ref 0–70)
ANION GAP SERPL CALCULATED.3IONS-SCNC: 12 MMOL/L (ref 7–15)
AST SERPL W P-5'-P-CCNC: 15 U/L (ref 0–45)
BASOPHILS # BLD AUTO: 0.1 10E3/UL (ref 0–0.2)
BASOPHILS NFR BLD AUTO: 1 %
BILIRUB SERPL-MCNC: 0.6 MG/DL
BUN SERPL-MCNC: 11.1 MG/DL (ref 8–23)
CALCIUM SERPL-MCNC: 8.7 MG/DL (ref 8.8–10.2)
CHLORIDE SERPL-SCNC: 103 MMOL/L (ref 98–107)
CREAT SERPL-MCNC: 0.67 MG/DL (ref 0.67–1.17)
DEPRECATED HCO3 PLAS-SCNC: 24 MMOL/L (ref 22–29)
EGFRCR SERPLBLD CKD-EPI 2021: >90 ML/MIN/1.73M2
EOSINOPHIL # BLD AUTO: 0.4 10E3/UL (ref 0–0.7)
EOSINOPHIL NFR BLD AUTO: 7 %
ERYTHROCYTE [DISTWIDTH] IN BLOOD BY AUTOMATED COUNT: 14 % (ref 10–15)
GLUCOSE SERPL-MCNC: 100 MG/DL (ref 70–99)
HCT VFR BLD AUTO: 38.7 % (ref 40–53)
HGB BLD-MCNC: 13.4 G/DL (ref 13.3–17.7)
IMM GRANULOCYTES # BLD: 0 10E3/UL
IMM GRANULOCYTES NFR BLD: 0 %
LYMPHOCYTES # BLD AUTO: 0.7 10E3/UL (ref 0.8–5.3)
LYMPHOCYTES NFR BLD AUTO: 12 %
MCH RBC QN AUTO: 31.5 PG (ref 26.5–33)
MCHC RBC AUTO-ENTMCNC: 34.6 G/DL (ref 31.5–36.5)
MCV RBC AUTO: 91 FL (ref 78–100)
MONOCYTES # BLD AUTO: 0.5 10E3/UL (ref 0–1.3)
MONOCYTES NFR BLD AUTO: 9 %
NEUTROPHILS # BLD AUTO: 4.1 10E3/UL (ref 1.6–8.3)
NEUTROPHILS NFR BLD AUTO: 70 %
NRBC # BLD AUTO: 0 10E3/UL
NRBC BLD AUTO-RTO: 0 /100
PLATELET # BLD AUTO: 152 10E3/UL (ref 150–450)
POTASSIUM SERPL-SCNC: 4.1 MMOL/L (ref 3.4–5.3)
PROT SERPL-MCNC: 6.3 G/DL (ref 6.4–8.3)
PSA SERPL DL<=0.01 NG/ML-MCNC: <0.01 NG/ML (ref 0–6.5)
RBC # BLD AUTO: 4.25 10E6/UL (ref 4.4–5.9)
SODIUM SERPL-SCNC: 139 MMOL/L (ref 135–145)
WBC # BLD AUTO: 5.8 10E3/UL (ref 4–11)

## 2024-04-11 PROCEDURE — 84153 ASSAY OF PSA TOTAL: CPT

## 2024-04-11 PROCEDURE — 85025 COMPLETE CBC W/AUTO DIFF WBC: CPT

## 2024-04-11 PROCEDURE — 80053 COMPREHEN METABOLIC PANEL: CPT

## 2024-04-11 PROCEDURE — 36415 COLL VENOUS BLD VENIPUNCTURE: CPT

## 2024-04-15 ENCOUNTER — ONCOLOGY VISIT (OUTPATIENT)
Dept: ONCOLOGY | Facility: HOSPITAL | Age: 74
End: 2024-04-15
Attending: INTERNAL MEDICINE
Payer: COMMERCIAL

## 2024-04-15 VITALS
SYSTOLIC BLOOD PRESSURE: 133 MMHG | OXYGEN SATURATION: 100 % | DIASTOLIC BLOOD PRESSURE: 72 MMHG | HEIGHT: 69 IN | TEMPERATURE: 99 F | WEIGHT: 207 LBS | RESPIRATION RATE: 18 BRPM | HEART RATE: 69 BPM | BODY MASS INDEX: 30.66 KG/M2

## 2024-04-15 DIAGNOSIS — C61 PROSTATE CANCER (H): ICD-10-CM

## 2024-04-15 DIAGNOSIS — C61 PRIMARY MALIGNANT NEOPLASM OF PROSTATE (H): Primary | ICD-10-CM

## 2024-04-15 PROCEDURE — G0463 HOSPITAL OUTPT CLINIC VISIT: HCPCS | Performed by: INTERNAL MEDICINE

## 2024-04-15 PROCEDURE — 99213 OFFICE O/P EST LOW 20 MIN: CPT | Performed by: INTERNAL MEDICINE

## 2024-04-15 ASSESSMENT — PAIN SCALES - GENERAL: PAINLEVEL: NO PAIN (0)

## 2024-04-15 NOTE — PROGRESS NOTES
[facility] Hematology and Oncology Progress Note    Patient: Sage COOMBS Gritten  MRN: [id number]  Date of Service: Apr 15, 2024        Assessment and Plan:    1.  Prostate cancer: Pt not currently on any treatment as Eligard was placed on hold after last dose 6 months ago. Patient was having a lot of pain, fatigue, and depression while he was on treatment, even just the Eligard. Since the last visit, his pain has disappeared, and his energy level and mood are both up.  His PSA has remained undetectable. We can restart the Eligard when his PSA starts to increase.  Per Dr. Bright, at that time we can also consider using enzalutamide if he did not tolerate abiraterone well.  CMP from April 11 personally reviewed and shows a calcium of 8.7, BUN 11.1 g creatinine 0.67, and total protein of 6.3. CBC reviewed, Hgb 13.4, WBC 5.8, plts 152K.      We will continue to see him to check his labs every 3 months.      ECOG Performance  0    Diagnosis:    1.  Prostate cancer: To be diagnosed June 2022. At the time of diagnosis there was suspicion of marek metastases in the bilateral iliac nodes.  Initial MRI showed a PI-RADS 5 lesion measuring 4 x 5 x 6.3 cm.  There was extracapsular extension involving the rectal wall and external inner sphincter.  Also question of bilateral iliac marek mets with the largest measuring 7.6 mm.  TURBT revealed a [place] 4+5 with extraprostatic extension and perineural invasion. He was staged as 3C (cT3a cN0 cM0) grade group 5.  A PSMA PET scan September 1, 2022 showed disease confined to the prostate.  PSA was 92.  Stage T3a.     Treatment:    He initially received Firmagon and then Eligard (6 month), abiraterone and prednisone in October 2022.  Radiation was started November 21, 2022.   Abiraterone and prednisone stopped in Sept., 2023 secondary to depression and extreme weakness.    Interim History:    Rich comes in today for follow-up visit.  He reports that he is doing well. His  "previous  diffuse joint pain has resolved. He no longer is using a cane. He walks 3-5 miles per day and feels his energy is much improved over the last 6 months.  He still gets some hot flashes occasionally. He is eating and drinking well. Denies any trouble with urination.     Review of Systems:    As above in the history.     Review of Systems otherwise Negative for:  General: chills, fever or night sweats  Psychological: anxiety or depression  Ophthalmic: blurry vision, double vision or loss of vision, vision change  ENT: epistaxis, oral lesions, hearing changes  Hematological and Lymphatic: bleeding, bruising, jaundice, swollen lymph nodes  Endocrine:  unexpected weight changes  Respiratory: cough, hemoptysis, orthopnea or shortness of breath/ORTIZ  Cardiovascular: chest pain, edema, palpitations or PND  Gastrointestinal: abdominal pain, blood in stools, change in bowel habits, constipation, diarrhea or nausea/vomiting  Genito-Urinary: change in urinary stream, incontinence, frequency/urgency  Musculoskeletal: joint pain, stiffness, swelling, muscle pain  Neurological: dizziness, headaches, numbness/tingling  Dermatological: lumps and rash    Past History:    Past Medical History:   Diagnosis Date    Mumps      Physical Exam:    /72 (BP Location: Left arm, Patient Position: Sitting, Cuff Size: Adult Large)   Pulse 69   Temp 99  F (37.2  C) (Tympanic)   Resp 18   Ht 1.753 m (5' 9\")   Wt 93.9 kg (207 lb)   SpO2 100%   BMI 30.57 kg/m      General: Well-appearing male in no acute distress. Cooperative in conversation.  Eyes: EOMI, PERRL. No scleral icterus.  ENT: Oral mucosa is moist without lesions or thrush. No ulcerations or mucositis noted.  Lymphatic: Neck is supple without cervical, axillary, or supraclavicular lymphadenopathy.   Cardiovascular: RRR No murmurs, gallops, or rubs. No peripheral edema.  Respiratory: CTA bilaterally. No wheezes or crackles.  Gastrointestinal: BS +. Abdomen soft, " non-tender. No palpable hepatosplenomegaly or masses.   Neurologic: Alert and oriented x3. Cranial nerves II through XII are grossly intact.  Skin: No rashes, petechiae, or bruising noted. Warm, dry, intact.      Lab Results:    Recent Results (from the past 168 hour(s))   PSA, tumor marker   Result Value Ref Range    PSA Tumor Marker <0.01 0.00 - 6.50 ng/mL   Comprehensive metabolic panel (BMP + Alb, Alk Phos, ALT, AST, Total. Bili, TP)   Result Value Ref Range    Sodium 139 135 - 145 mmol/L    Potassium 4.1 3.4 - 5.3 mmol/L    Carbon Dioxide (CO2) 24 22 - 29 mmol/L    Anion Gap 12 7 - 15 mmol/L    Urea Nitrogen 11.1 8.0 - 23.0 mg/dL    Creatinine 0.67 0.67 - 1.17 mg/dL    GFR Estimate >90 >60 mL/min/1.73m2    Calcium 8.7 (L) 8.8 - 10.2 mg/dL    Chloride 103 98 - 107 mmol/L    Glucose 100 (H) 70 - 99 mg/dL    Alkaline Phosphatase 98 40 - 150 U/L    AST 15 0 - 45 U/L    ALT 12 0 - 70 U/L    Protein Total 6.3 (L) 6.4 - 8.3 g/dL    Albumin 4.1 3.5 - 5.2 g/dL    Bilirubin Total 0.6 <=1.2 mg/dL   CBC with platelets and differential   Result Value Ref Range    WBC Count 5.8 4.0 - 11.0 10e3/uL    RBC Count 4.25 (L) 4.40 - 5.90 10e6/uL    Hemoglobin 13.4 13.3 - 17.7 g/dL    Hematocrit 38.7 (L) 40.0 - 53.0 %    MCV 91 78 - 100 fL    MCH 31.5 26.5 - 33.0 pg    MCHC 34.6 31.5 - 36.5 g/dL    RDW 14.0 10.0 - 15.0 %    Platelet Count 152 150 - 450 10e3/uL    % Neutrophils 70 %    % Lymphocytes 12 %    % Monocytes 9 %    % Eosinophils 7 %    % Basophils 1 %    % Immature Granulocytes 0 %    NRBCs per 100 WBC 0 <1 /100    Absolute Neutrophils 4.1 1.6 - 8.3 10e3/uL    Absolute Lymphocytes 0.7 (L) 0.8 - 5.3 10e3/uL    Absolute Monocytes 0.5 0.0 - 1.3 10e3/uL    Absolute Eosinophils 0.4 0.0 - 0.7 10e3/uL    Absolute Basophils 0.1 0.0 - 0.2 10e3/uL    Absolute Immature Granulocytes 0.0 <=0.4 10e3/uL    Absolute NRBCs 0.0 10e3/uL     Imaging:    No results found.      Signed by: GERTRUDE Gonzalez CNP

## 2024-04-15 NOTE — LETTER
4/15/2024         RE: Sage Jones  [address]  [place] [address]        Dear Colleague,    Thank you for referring your patient, Sage Jones, to the [facility]. Please see a copy of my visit note below.    [facility] Hematology and Oncology Progress Note    Patient: Sage Jones  MRN: [id number]  Date of Service: Apr 15, 2024        Assessment and Plan:    1.  Prostate cancer: Pt not currently on any treatment as Eligard was placed on hold after last dose 6 months ago. Patient was having a lot of pain, fatigue, and depression while he was on treatment, even just the Eligard. Since the last visit, his pain has disappeared, and his energy level and mood are both up.  His PSA has remained undetectable. We can restart the Eligard when his PSA starts to increase.  Per Dr. Bright, at that time we can also consider using enzalutamide if he did not tolerate abiraterone well.  CMP from April 11 personally reviewed and shows a calcium of 8.7, BUN 11.1 g creatinine 0.67, and total protein of 6.3. CBC reviewed, Hgb 13.4, WBC 5.8, plts 152K.      We will continue to see him to check his labs every 3 months.      ECOG Performance  0    Diagnosis:    1.  Prostate cancer: To be diagnosed June 2022. At the time of diagnosis there was suspicion of marek metastases in the bilateral iliac nodes.  Initial MRI showed a PI-RADS 5 lesion measuring 4 x 5 x 6.3 cm.  There was extracapsular extension involving the rectal wall and external inner sphincter.  Also question of bilateral iliac marek mets with the largest measuring 7.6 mm.  TURBT revealed a [place] 4+5 with extraprostatic extension and perineural invasion. He was staged as 3C (cT3a cN0 cM0) grade group 5.  A PSMA PET scan September 1, 2022 showed disease confined to the prostate.  PSA was 92.  Stage T3a.     Treatment:    He initially received Firmagon and then Eligard (6 month), abiraterone and  "prednisone in October 2022.  Radiation was started November 21, 2022.   Abiraterone and prednisone stopped in Sept., 2023 secondary to depression and extreme weakness.    Interim History:    Rich comes in today for follow-up visit.  He reports that he is doing well. His previous  diffuse joint pain has resolved. He no longer is using a cane. He walks 3-5 miles per day and feels his energy is much improved over the last 6 months.  He still gets some hot flashes occasionally. He is eating and drinking well. Denies any trouble with urination.     Review of Systems:    As above in the history.     Review of Systems otherwise Negative for:  General: chills, fever or night sweats  Psychological: anxiety or depression  Ophthalmic: blurry vision, double vision or loss of vision, vision change  ENT: epistaxis, oral lesions, hearing changes  Hematological and Lymphatic: bleeding, bruising, jaundice, swollen lymph nodes  Endocrine:  unexpected weight changes  Respiratory: cough, hemoptysis, orthopnea or shortness of breath/ORTIZ  Cardiovascular: chest pain, edema, palpitations or PND  Gastrointestinal: abdominal pain, blood in stools, change in bowel habits, constipation, diarrhea or nausea/vomiting  Genito-Urinary: change in urinary stream, incontinence, frequency/urgency  Musculoskeletal: joint pain, stiffness, swelling, muscle pain  Neurological: dizziness, headaches, numbness/tingling  Dermatological: lumps and rash    Past History:    Past Medical History:   Diagnosis Date     Mumps      Physical Exam:    /72 (BP Location: Left arm, Patient Position: Sitting, Cuff Size: Adult Large)   Pulse 69   Temp 99  F (37.2  C) (Tympanic)   Resp 18   Ht 1.753 m (5' 9\")   Wt 93.9 kg (207 lb)   SpO2 100%   BMI 30.57 kg/m      General: Well-appearing male in no acute distress. Cooperative in conversation.  Eyes: EOMI, PERRL. No scleral icterus.  ENT: Oral mucosa is moist without lesions or thrush. No ulcerations or mucositis " noted.  Lymphatic: Neck is supple without cervical, axillary, or supraclavicular lymphadenopathy.   Cardiovascular: RRR No murmurs, gallops, or rubs. No peripheral edema.  Respiratory: CTA bilaterally. No wheezes or crackles.  Gastrointestinal: BS +. Abdomen soft, non-tender. No palpable hepatosplenomegaly or masses.   Neurologic: Alert and oriented x3. Cranial nerves II through XII are grossly intact.  Skin: No rashes, petechiae, or bruising noted. Warm, dry, intact.      Lab Results:    Recent Results (from the past 168 hour(s))   PSA, tumor marker   Result Value Ref Range    PSA Tumor Marker <0.01 0.00 - 6.50 ng/mL   Comprehensive metabolic panel (BMP + Alb, Alk Phos, ALT, AST, Total. Bili, TP)   Result Value Ref Range    Sodium 139 135 - 145 mmol/L    Potassium 4.1 3.4 - 5.3 mmol/L    Carbon Dioxide (CO2) 24 22 - 29 mmol/L    Anion Gap 12 7 - 15 mmol/L    Urea Nitrogen 11.1 8.0 - 23.0 mg/dL    Creatinine 0.67 0.67 - 1.17 mg/dL    GFR Estimate >90 >60 mL/min/1.73m2    Calcium 8.7 (L) 8.8 - 10.2 mg/dL    Chloride 103 98 - 107 mmol/L    Glucose 100 (H) 70 - 99 mg/dL    Alkaline Phosphatase 98 40 - 150 U/L    AST 15 0 - 45 U/L    ALT 12 0 - 70 U/L    Protein Total 6.3 (L) 6.4 - 8.3 g/dL    Albumin 4.1 3.5 - 5.2 g/dL    Bilirubin Total 0.6 <=1.2 mg/dL   CBC with platelets and differential   Result Value Ref Range    WBC Count 5.8 4.0 - 11.0 10e3/uL    RBC Count 4.25 (L) 4.40 - 5.90 10e6/uL    Hemoglobin 13.4 13.3 - 17.7 g/dL    Hematocrit 38.7 (L) 40.0 - 53.0 %    MCV 91 78 - 100 fL    MCH 31.5 26.5 - 33.0 pg    MCHC 34.6 31.5 - 36.5 g/dL    RDW 14.0 10.0 - 15.0 %    Platelet Count 152 150 - 450 10e3/uL    % Neutrophils 70 %    % Lymphocytes 12 %    % Monocytes 9 %    % Eosinophils 7 %    % Basophils 1 %    % Immature Granulocytes 0 %    NRBCs per 100 WBC 0 <1 /100    Absolute Neutrophils 4.1 1.6 - 8.3 10e3/uL    Absolute Lymphocytes 0.7 (L) 0.8 - 5.3 10e3/uL    Absolute Monocytes 0.5 0.0 - 1.3 10e3/uL    Absolute  "Eosinophils 0.4 0.0 - 0.7 10e3/uL    Absolute Basophils 0.1 0.0 - 0.2 10e3/uL    Absolute Immature Granulocytes 0.0 <=0.4 10e3/uL    Absolute NRBCs 0.0 10e3/uL     Imaging:    No results found.      Signed by: GERTRUDE Gonzalez CNP      Oncology Rooming Note    April 15, 2024 12:59 PM   Sage Jones is a 74 year old male who presents for:    Chief Complaint   Patient presents with     Oncology Clinic Visit     Primary malignant neoplasm of prostate      Initial Vitals: /72 (BP Location: Left arm, Patient Position: Sitting, Cuff Size: Adult Large)   Pulse 69   Temp 99  F (37.2  C) (Tympanic)   Resp 18   Ht 1.753 m (5' 9\")   Wt 93.9 kg (207 lb)   SpO2 100%   BMI 30.57 kg/m   Estimated body mass index is 30.57 kg/m  as calculated from the following:    Height as of this encounter: 1.753 m (5' 9\").    Weight as of this encounter: 93.9 kg (207 lb). Body surface area is 2.14 meters squared.  No Pain (0) Comment: Data Unavailable   No LMP for male patient.  Allergies reviewed: Yes  Medications reviewed: Yes    Medications: Medication refills not needed today.  Pharmacy name entered into [facility]:    [facility] [address] [place] [address] [id number] [place], [place] - [address] [facility]    Frailty Screening:   Is the patient here for a new oncology consult visit in cancer care? 2. No      Clinical concerns: No concerns per patient. Follow up.       Cece Mason MA                Attestation signed by Reed Bright MD at 4/15/2024  3:19 PM:      Physician Attestation    I saw and evaluated Sage Jones as part of a shared APRN/PA visit.     I personally reviewed the vital signs and labs.    I personally provided a substantive portion of care for this patient and I approve the care plan as written by the NADIYA.  I was involved with Medical Decision Making including: Please see A&P for additional details of medical decision making.    Reed Bright MD  Date of Service (when I saw " the patient): 04/15/24      Again, thank you for allowing me to participate in the care of your patient.        Sincerely,        Reed Bright MD

## 2024-04-15 NOTE — PROGRESS NOTES
"Oncology Rooming Note    April 15, 2024 12:59 PM   Sage Jones is a 74 year old male who presents for:    Chief Complaint   Patient presents with    Oncology Clinic Visit     Primary malignant neoplasm of prostate      Initial Vitals: /72 (BP Location: Left arm, Patient Position: Sitting, Cuff Size: Adult Large)   Pulse 69   Temp 99  F (37.2  C) (Tympanic)   Resp 18   Ht 1.753 m (5' 9\")   Wt 93.9 kg (207 lb)   SpO2 100%   BMI 30.57 kg/m   Estimated body mass index is 30.57 kg/m  as calculated from the following:    Height as of this encounter: 1.753 m (5' 9\").    Weight as of this encounter: 93.9 kg (207 lb). Body surface area is 2.14 meters squared.  No Pain (0) Comment: Data Unavailable   No LMP for male patient.  Allergies reviewed: Yes  Medications reviewed: Yes    Medications: Medication refills not needed today.  Pharmacy name entered into [facility]:    [facility] [address] [place], [place] - [address] [facility] [id number] [place], [place] - [address] [facility]    Frailty Screening:   Is the patient here for a new oncology consult visit in cancer care? 2. No      Clinical concerns: No concerns per patient. Follow up.       Cece Mason MA              "

## 2024-07-10 ENCOUNTER — LAB (OUTPATIENT)
Dept: INFUSION THERAPY | Facility: HOSPITAL | Age: 74
End: 2024-07-10
Attending: INTERNAL MEDICINE
Payer: COMMERCIAL

## 2024-07-10 DIAGNOSIS — C61 PRIMARY MALIGNANT NEOPLASM OF PROSTATE (H): ICD-10-CM

## 2024-07-10 LAB
ALBUMIN SERPL BCG-MCNC: 4.1 G/DL (ref 3.5–5.2)
ALP SERPL-CCNC: 88 U/L (ref 40–150)
ALT SERPL W P-5'-P-CCNC: 8 U/L (ref 0–70)
ANION GAP SERPL CALCULATED.3IONS-SCNC: 8 MMOL/L (ref 7–15)
AST SERPL W P-5'-P-CCNC: 17 U/L (ref 0–45)
BASOPHILS # BLD AUTO: 0.1 10E3/UL (ref 0–0.2)
BASOPHILS NFR BLD AUTO: 1 %
BILIRUB SERPL-MCNC: 0.5 MG/DL
BUN SERPL-MCNC: 15.3 MG/DL (ref 8–23)
CALCIUM SERPL-MCNC: 9.2 MG/DL (ref 8.8–10.2)
CHLORIDE SERPL-SCNC: 104 MMOL/L (ref 98–107)
CREAT SERPL-MCNC: 0.7 MG/DL (ref 0.67–1.17)
DEPRECATED HCO3 PLAS-SCNC: 27 MMOL/L (ref 22–29)
EGFRCR SERPLBLD CKD-EPI 2021: >90 ML/MIN/1.73M2
EOSINOPHIL # BLD AUTO: 0.5 10E3/UL (ref 0–0.7)
EOSINOPHIL NFR BLD AUTO: 8 %
ERYTHROCYTE [DISTWIDTH] IN BLOOD BY AUTOMATED COUNT: 13.6 % (ref 10–15)
GLUCOSE SERPL-MCNC: 115 MG/DL (ref 70–99)
HCT VFR BLD AUTO: 38.6 % (ref 40–53)
HGB BLD-MCNC: 13.5 G/DL (ref 13.3–17.7)
IMM GRANULOCYTES # BLD: 0 10E3/UL
IMM GRANULOCYTES NFR BLD: 0 %
LYMPHOCYTES # BLD AUTO: 0.7 10E3/UL (ref 0.8–5.3)
LYMPHOCYTES NFR BLD AUTO: 12 %
MCH RBC QN AUTO: 31.5 PG (ref 26.5–33)
MCHC RBC AUTO-ENTMCNC: 35 G/DL (ref 31.5–36.5)
MCV RBC AUTO: 90 FL (ref 78–100)
MONOCYTES # BLD AUTO: 0.6 10E3/UL (ref 0–1.3)
MONOCYTES NFR BLD AUTO: 10 %
NEUTROPHILS # BLD AUTO: 4.1 10E3/UL (ref 1.6–8.3)
NEUTROPHILS NFR BLD AUTO: 69 %
NRBC # BLD AUTO: 0 10E3/UL
NRBC BLD AUTO-RTO: 0 /100
PLATELET # BLD AUTO: 156 10E3/UL (ref 150–450)
POTASSIUM SERPL-SCNC: 4 MMOL/L (ref 3.4–5.3)
PROT SERPL-MCNC: 6.2 G/DL (ref 6.4–8.3)
RBC # BLD AUTO: 4.28 10E6/UL (ref 4.4–5.9)
SODIUM SERPL-SCNC: 139 MMOL/L (ref 135–145)
WBC # BLD AUTO: 6 10E3/UL (ref 4–11)

## 2024-07-10 PROCEDURE — 80053 COMPREHEN METABOLIC PANEL: CPT

## 2024-07-10 PROCEDURE — 36415 COLL VENOUS BLD VENIPUNCTURE: CPT

## 2024-07-10 PROCEDURE — 85025 COMPLETE CBC W/AUTO DIFF WBC: CPT

## 2024-07-10 PROCEDURE — 84153 ASSAY OF PSA TOTAL: CPT

## 2024-07-11 LAB — PSA SERPL DL<=0.01 NG/ML-MCNC: <0.01 NG/ML (ref 0–6.5)

## 2024-07-15 ENCOUNTER — ONCOLOGY VISIT (OUTPATIENT)
Dept: ONCOLOGY | Facility: HOSPITAL | Age: 74
End: 2024-07-15
Payer: COMMERCIAL

## 2024-07-15 VITALS
SYSTOLIC BLOOD PRESSURE: 142 MMHG | BODY MASS INDEX: 31.47 KG/M2 | TEMPERATURE: 97 F | WEIGHT: 213.1 LBS | HEART RATE: 72 BPM | OXYGEN SATURATION: 100 % | DIASTOLIC BLOOD PRESSURE: 85 MMHG

## 2024-07-15 DIAGNOSIS — C61 PRIMARY MALIGNANT NEOPLASM OF PROSTATE (H): Primary | ICD-10-CM

## 2024-07-15 PROCEDURE — G0463 HOSPITAL OUTPT CLINIC VISIT: HCPCS

## 2024-07-15 PROCEDURE — 99215 OFFICE O/P EST HI 40 MIN: CPT

## 2024-07-15 PROCEDURE — G2211 COMPLEX E/M VISIT ADD ON: HCPCS

## 2024-07-15 ASSESSMENT — PAIN SCALES - GENERAL: PAINLEVEL: NO PAIN (0)

## 2024-07-15 NOTE — LETTER
7/15/2024      Sage Jones  [address]  [place] [address]      Dear Colleague,    Thank you for referring your patient, Sage Jones, to the [facility] CANCER [facility]. Please see a copy of my visit note below.    [facility] Hematology and Oncology Progress Note    Patient: Sage Jones  MRN: [id number]  Date of Service: Jul 15, 2024        Assessment and Plan:    1.  Prostate cancer.  Pt not currently on any treatment as Eligard was placed on hold after last dose 6 months ago. Patient was having a lot of pain, fatigue, and depression while he was on treatment, even just the Eligard. Since the last visit, his pain has disappeared, and his energy level and mood are both up.  His PSA has remained undetectable. We can restart the Eligard when his PSA starts to increase.  Per Dr. Bright, at that time we can also consider using enzalutamide since he did not tolerate abiraterone well.      CMP from 7/10/2024 personally reviewed and show stability. CMP shows a total protein of 6.2, and is otherwise unremarkable. CBC reviewed, Hgb 13.5, WBC 6.0, plts 156K.  PSA remains undetectable and patient does not show evidence for cancer progression/recurrence.    Recommend continued surveillance  We will continue to see him to check his labs every 3 months.      ECOG Performance  0    Diagnosis:    1.  Prostate cancer: Diagnosed June 2022. At the time of diagnosis there was suspicion of marek metastases in the bilateral iliac nodes.  Initial MRI showed a PI-RADS 5 lesion measuring 4 x 5 x 6.3 cm.  There was extracapsular extension involving the rectal wall and external inner sphincter.  Also question of bilateral iliac marek mets with the largest measuring 7.6 mm.  TURBT revealed a [place] 4+5 with extraprostatic extension and perineural invasion. He was staged as 3C (cT3a cN0 cM0) grade group 5.  A PSMA PET scan September 1, 2022 showed disease confined to the prostate.  PSA  was 92.  Stage T3a.     Treatment:    He initially received Firmagon and then Eligard (6 month), abiraterone and prednisone in October 2022.  Radiation was started November 21, 2022.   Abiraterone and prednisone stopped in Sept., 2023 secondary to depression and extreme weakness.    Interim History:    Rich comes in today for follow-up visit.  He reports that he is doing well.  He does have ongoing joint pain but reports that this is doing better.  Occasionally takes SAMe with adequate pain relief.  He will get hot flashes occasionally these are tolerable.  He is eating and drinking well, denies any new health concerns today.    Review of Systems:    As above in the history.     Review of Systems otherwise Negative for:  General: chills, fever or night sweats  Psychological: anxiety or depression  Ophthalmic: blurry vision, double vision or loss of vision, vision change  ENT: epistaxis, oral lesions, hearing changes  Hematological and Lymphatic: bleeding, bruising, jaundice, swollen lymph nodes  Endocrine:  unexpected weight changes  Respiratory: cough, hemoptysis, orthopnea or shortness of breath/ORTIZ  Cardiovascular: chest pain, edema, palpitations or PND  Gastrointestinal: abdominal pain, blood in stools, change in bowel habits, constipation, diarrhea or nausea/vomiting  Genito-Urinary: change in urinary stream, incontinence, frequency/urgency  Musculoskeletal: joint pain, stiffness, swelling, muscle pain  Neurological: dizziness, headaches, numbness/tingling  Dermatological: lumps and rash    Past History:    Past Medical History:   Diagnosis Date     Mumps      Physical Exam:    There were no vitals taken for this visit.    General: Well-appearing male in no acute distress. Cooperative in conversation.  Eyes: EOMI, PERRL. No scleral icterus.  ENT: Oral mucosa is moist without lesions or thrush. No ulcerations or mucositis noted.  Lymphatic: Neck is supple without cervical, axillary, or supraclavicular  lymphadenopathy.   Cardiovascular: RRR No murmurs, gallops, or rubs. No peripheral edema.  Respiratory: CTA bilaterally. No wheezes or crackles.  Gastrointestinal: BS +. Abdomen soft, non-tender. No palpable hepatosplenomegaly or masses.   Neurologic: Alert and oriented x3. Cranial nerves II through XII are grossly intact.  Skin: No rashes, petechiae, or bruising noted. Warm, dry, intact.      Lab Results:    Recent Results (from the past 168 hour(s))   Comprehensive metabolic panel   Result Value Ref Range    Sodium 139 135 - 145 mmol/L    Potassium 4.0 3.4 - 5.3 mmol/L    Carbon Dioxide (CO2) 27 22 - 29 mmol/L    Anion Gap 8 7 - 15 mmol/L    Urea Nitrogen 15.3 8.0 - 23.0 mg/dL    Creatinine 0.70 0.67 - 1.17 mg/dL    GFR Estimate >90 >60 mL/min/1.73m2    Calcium 9.2 8.8 - 10.2 mg/dL    Chloride 104 98 - 107 mmol/L    Glucose 115 (H) 70 - 99 mg/dL    Alkaline Phosphatase 88 40 - 150 U/L    AST 17 0 - 45 U/L    ALT 8 0 - 70 U/L    Protein Total 6.2 (L) 6.4 - 8.3 g/dL    Albumin 4.1 3.5 - 5.2 g/dL    Bilirubin Total 0.5 <=1.2 mg/dL   PSA, tumor marker   Result Value Ref Range    PSA Tumor Marker <0.01 0.00 - 6.50 ng/mL   CBC with platelets and differential   Result Value Ref Range    WBC Count 6.0 4.0 - 11.0 10e3/uL    RBC Count 4.28 (L) 4.40 - 5.90 10e6/uL    Hemoglobin 13.5 13.3 - 17.7 g/dL    Hematocrit 38.6 (L) 40.0 - 53.0 %    MCV 90 78 - 100 fL    MCH 31.5 26.5 - 33.0 pg    MCHC 35.0 31.5 - 36.5 g/dL    RDW 13.6 10.0 - 15.0 %    Platelet Count 156 150 - 450 10e3/uL    % Neutrophils 69 %    % Lymphocytes 12 %    % Monocytes 10 %    % Eosinophils 8 %    % Basophils 1 %    % Immature Granulocytes 0 %    NRBCs per 100 WBC 0 <1 /100    Absolute Neutrophils 4.1 1.6 - 8.3 10e3/uL    Absolute Lymphocytes 0.7 (L) 0.8 - 5.3 10e3/uL    Absolute Monocytes 0.6 0.0 - 1.3 10e3/uL    Absolute Eosinophils 0.5 0.0 - 0.7 10e3/uL    Absolute Basophils 0.1 0.0 - 0.2 10e3/uL    Absolute Immature Granulocytes 0.0 <=0.4 10e3/uL     "Absolute NRBCs 0.0 10e3/uL     Imaging:    No results found.      Billing  Total time 45 minutes, to include face to face visit, review of EMR, ordering, documentation and coordination of care on date of service   complexity modifier for longitudinal care.     Signed by: GERTRUDE Gonzalez CNP      Oncology Rooming Note    July 15, 2024 1:09 PM   Sage Jones is a 74 year old male who presents for:    Chief Complaint   Patient presents with     Oncology Clinic Visit         Primary malignant neoplasm of prostate          Initial Vitals: BP (!) 142/85 (BP Location: Left arm, Patient Position: Sitting, Cuff Size: Adult Regular)   Pulse 72   Temp 97  F (36.1  C) (Tympanic)   Wt 96.7 kg (213 lb 1.6 oz)   SpO2 100%   BMI 31.47 kg/m   Estimated body mass index is 31.47 kg/m  as calculated from the following:    Height as of 4/15/24: 1.753 m (5' 9\").    Weight as of this encounter: 96.7 kg (213 lb 1.6 oz). Body surface area is 2.17 meters squared.  No Pain (0) Comment: Data Unavailable   No LMP for male patient.  Allergies reviewed: Yes  Medications reviewed: Yes    Medications: Medication refills not needed today.  Pharmacy name entered into [facility]: [facility] [address] [place] [address]    Frailty Screening:   Is the patient here for a new oncology consult visit in cancer care? 2. No      Clinical concerns: None    Steph Woodward MA                Again, thank you for allowing me to participate in the care of your patient.        Sincerely,        GERTRUDE Gonzalez CNP  "

## 2024-07-15 NOTE — PROGRESS NOTES
[facility] Hematology and Oncology Progress Note    Patient: aSge Jones  MRN: [id number]  Date of Service: Jul 15, 2024        Assessment and Plan:    1.  Prostate cancer.  Pt not currently on any treatment as Eligard was placed on hold after last dose 6 months ago. Patient was having a lot of pain, fatigue, and depression while he was on treatment, even just the Eligard. Since the last visit, his pain has disappeared, and his energy level and mood are both up.  His PSA has remained undetectable. We can restart the Eligard when his PSA starts to increase.  Per Dr. Bright, at that time we can also consider using enzalutamide since he did not tolerate abiraterone well.      CMP from 7/10/2024 personally reviewed and show stability. CMP shows a total protein of 6.2, and is otherwise unremarkable. CBC reviewed, Hgb 13.5, WBC 6.0, plts 156K.  PSA remains undetectable and patient does not show evidence for cancer progression/recurrence.    Recommend continued surveillance  We will continue to see him to check his labs every 3 months.      ECOG Performance  0    Diagnosis:    1.  Prostate cancer: Diagnosed June 2022. At the time of diagnosis there was suspicion of marek metastases in the bilateral iliac nodes.  Initial MRI showed a PI-RADS 5 lesion measuring 4 x 5 x 6.3 cm.  There was extracapsular extension involving the rectal wall and external inner sphincter.  Also question of bilateral iliac marek mets with the largest measuring 7.6 mm.  TURBT revealed a [place] 4+5 with extraprostatic extension and perineural invasion. He was staged as 3C (cT3a cN0 cM0) grade group 5.  A PSMA PET scan September 1, 2022 showed disease confined to the prostate.  PSA was 92.  Stage T3a.     Treatment:    He initially received Firmagon and then Eligard (6 month), abiraterone and prednisone in October 2022.  Radiation was started November 21, 2022.   Abiraterone and prednisone stopped in Sept., 2023 secondary to depression  and extreme weakness.    Interim History:    Rich comes in today for follow-up visit.  He reports that he is doing well.  He does have ongoing joint pain but reports that this is doing better.  Occasionally takes SAMe with adequate pain relief.  He will get hot flashes occasionally these are tolerable.  He is eating and drinking well, denies any new health concerns today.    Review of Systems:    As above in the history.     Review of Systems otherwise Negative for:  General: chills, fever or night sweats  Psychological: anxiety or depression  Ophthalmic: blurry vision, double vision or loss of vision, vision change  ENT: epistaxis, oral lesions, hearing changes  Hematological and Lymphatic: bleeding, bruising, jaundice, swollen lymph nodes  Endocrine:  unexpected weight changes  Respiratory: cough, hemoptysis, orthopnea or shortness of breath/ORTIZ  Cardiovascular: chest pain, edema, palpitations or PND  Gastrointestinal: abdominal pain, blood in stools, change in bowel habits, constipation, diarrhea or nausea/vomiting  Genito-Urinary: change in urinary stream, incontinence, frequency/urgency  Musculoskeletal: joint pain, stiffness, swelling, muscle pain  Neurological: dizziness, headaches, numbness/tingling  Dermatological: lumps and rash    Past History:    Past Medical History:   Diagnosis Date    Mumps      Physical Exam:    There were no vitals taken for this visit.    General: Well-appearing male in no acute distress. Cooperative in conversation.  Eyes: EOMI, PERRL. No scleral icterus.  ENT: Oral mucosa is moist without lesions or thrush. No ulcerations or mucositis noted.  Lymphatic: Neck is supple without cervical, axillary, or supraclavicular lymphadenopathy.   Cardiovascular: RRR No murmurs, gallops, or rubs. No peripheral edema.  Respiratory: CTA bilaterally. No wheezes or crackles.  Gastrointestinal: BS +. Abdomen soft, non-tender. No palpable hepatosplenomegaly or masses.   Neurologic: Alert and oriented  x3. Cranial nerves II through XII are grossly intact.  Skin: No rashes, petechiae, or bruising noted. Warm, dry, intact.      Lab Results:    Recent Results (from the past 168 hour(s))   Comprehensive metabolic panel   Result Value Ref Range    Sodium 139 135 - 145 mmol/L    Potassium 4.0 3.4 - 5.3 mmol/L    Carbon Dioxide (CO2) 27 22 - 29 mmol/L    Anion Gap 8 7 - 15 mmol/L    Urea Nitrogen 15.3 8.0 - 23.0 mg/dL    Creatinine 0.70 0.67 - 1.17 mg/dL    GFR Estimate >90 >60 mL/min/1.73m2    Calcium 9.2 8.8 - 10.2 mg/dL    Chloride 104 98 - 107 mmol/L    Glucose 115 (H) 70 - 99 mg/dL    Alkaline Phosphatase 88 40 - 150 U/L    AST 17 0 - 45 U/L    ALT 8 0 - 70 U/L    Protein Total 6.2 (L) 6.4 - 8.3 g/dL    Albumin 4.1 3.5 - 5.2 g/dL    Bilirubin Total 0.5 <=1.2 mg/dL   PSA, tumor marker   Result Value Ref Range    PSA Tumor Marker <0.01 0.00 - 6.50 ng/mL   CBC with platelets and differential   Result Value Ref Range    WBC Count 6.0 4.0 - 11.0 10e3/uL    RBC Count 4.28 (L) 4.40 - 5.90 10e6/uL    Hemoglobin 13.5 13.3 - 17.7 g/dL    Hematocrit 38.6 (L) 40.0 - 53.0 %    MCV 90 78 - 100 fL    MCH 31.5 26.5 - 33.0 pg    MCHC 35.0 31.5 - 36.5 g/dL    RDW 13.6 10.0 - 15.0 %    Platelet Count 156 150 - 450 10e3/uL    % Neutrophils 69 %    % Lymphocytes 12 %    % Monocytes 10 %    % Eosinophils 8 %    % Basophils 1 %    % Immature Granulocytes 0 %    NRBCs per 100 WBC 0 <1 /100    Absolute Neutrophils 4.1 1.6 - 8.3 10e3/uL    Absolute Lymphocytes 0.7 (L) 0.8 - 5.3 10e3/uL    Absolute Monocytes 0.6 0.0 - 1.3 10e3/uL    Absolute Eosinophils 0.5 0.0 - 0.7 10e3/uL    Absolute Basophils 0.1 0.0 - 0.2 10e3/uL    Absolute Immature Granulocytes 0.0 <=0.4 10e3/uL    Absolute NRBCs 0.0 10e3/uL     Imaging:    No results found.      Billing  Total time 45 minutes, to include face to face visit, review of EMR, ordering, documentation and coordination of care on date of service   complexity modifier for longitudinal care.     Signed  by: GERTRUDE Gonzalez CNP

## 2024-07-15 NOTE — PROGRESS NOTES
"Oncology Rooming Note    July 15, 2024 1:09 PM   Sage Jones is a 74 year old male who presents for:    Chief Complaint   Patient presents with    Oncology Clinic Visit         Primary malignant neoplasm of prostate          Initial Vitals: BP (!) 142/85 (BP Location: Left arm, Patient Position: Sitting, Cuff Size: Adult Regular)   Pulse 72   Temp 97  F (36.1  C) (Tympanic)   Wt 96.7 kg (213 lb 1.6 oz)   SpO2 100%   BMI 31.47 kg/m   Estimated body mass index is 31.47 kg/m  as calculated from the following:    Height as of 4/15/24: 1.753 m (5' 9\").    Weight as of this encounter: 96.7 kg (213 lb 1.6 oz). Body surface area is 2.17 meters squared.  No Pain (0) Comment: Data Unavailable   No LMP for male patient.  Allergies reviewed: Yes  Medications reviewed: Yes    Medications: Medication refills not needed today.  Pharmacy name entered into [facility]: [facility] [address] [place] [address]    Frailty Screening:   Is the patient here for a new oncology consult visit in cancer care? 2. No      Clinical concerns: None    Steph Woodward MA              "

## 2024-07-23 ENCOUNTER — PATIENT OUTREACH (OUTPATIENT)
Dept: CARE COORDINATION | Facility: CLINIC | Age: 74
End: 2024-07-23
Payer: COMMERCIAL

## 2024-08-06 ENCOUNTER — PATIENT OUTREACH (OUTPATIENT)
Dept: CARE COORDINATION | Facility: CLINIC | Age: 74
End: 2024-08-06
Payer: COMMERCIAL

## 2024-09-17 ENCOUNTER — OFFICE VISIT (OUTPATIENT)
Dept: FAMILY MEDICINE | Facility: CLINIC | Age: 74
End: 2024-09-17
Payer: COMMERCIAL

## 2024-09-17 VITALS
BODY MASS INDEX: 32.72 KG/M2 | SYSTOLIC BLOOD PRESSURE: 138 MMHG | WEIGHT: 220.9 LBS | HEIGHT: 69 IN | TEMPERATURE: 98 F | RESPIRATION RATE: 18 BRPM | HEART RATE: 78 BPM | OXYGEN SATURATION: 98 % | DIASTOLIC BLOOD PRESSURE: 80 MMHG

## 2024-09-17 DIAGNOSIS — E78.5 DYSLIPIDEMIA: ICD-10-CM

## 2024-09-17 DIAGNOSIS — Z92.3 S/P RADIATION THERAPY: ICD-10-CM

## 2024-09-17 DIAGNOSIS — C61 PROSTATE CANCER (H): Primary | ICD-10-CM

## 2024-09-17 DIAGNOSIS — I10 ESSENTIAL HYPERTENSION: ICD-10-CM

## 2024-09-17 DIAGNOSIS — R73.01 ELEVATED FASTING BLOOD SUGAR: ICD-10-CM

## 2024-09-17 PROCEDURE — G0439 PPPS, SUBSEQ VISIT: HCPCS | Performed by: FAMILY MEDICINE

## 2024-09-17 ASSESSMENT — ACTIVITIES OF DAILY LIVING (ADL)
DIFFICULTY_EATING/SWALLOWING: NO
DIFFICULTY_COMMUNICATING: NO
DOING_ERRANDS_INDEPENDENTLY_DIFFICULTY: NO
FALL_HISTORY_WITHIN_LAST_SIX_MONTHS: NO
WEAR_GLASSES_OR_BLIND: YES
WALKING_OR_CLIMBING_STAIRS_DIFFICULTY: NO
CONCENTRATING,_REMEMBERING_OR_MAKING_DECISIONS_DIFFICULTY: NO
HEARING_DIFFICULTY_OR_DEAF: NO
DRESSING/BATHING_DIFFICULTY: NO
TOILETING_ISSUES: NO
CHANGE_IN_FUNCTIONAL_STATUS_SINCE_ONSET_OF_CURRENT_ILLNESS/INJURY: NO

## 2024-09-17 ASSESSMENT — PAIN SCALES - GENERAL: PAINLEVEL: NO PAIN (0)

## 2024-09-17 NOTE — PROGRESS NOTES
Preventive Care Visit  [facility]  William Meng MD, MD, Family Medicine  Sep 17, 2024      Rich was seen today for recheck medication and wellness visit.    Diagnoses and all orders for this visit:    Prostate cancer (H)    S/P radiation therapy    Essential hypertension    Dyslipidemia  -     Lipid panel reflex to direct LDL Fasting; Future    Elevated fasting blood sugar  -     Hemoglobin A1c; Future           Subjective   Rich is a 74 year old, presenting for the following:  Recheck Medication and Wellness Visit    He has prostate cancer continues to follow with oncology.  PSA has remained undetectable now for some time.  He is not currently on active treatment.  He has upcoming lab test in October to recheck PSA and decide if he needs to return to treatment.  Overall he states he feels well.  Blood pressure is reasonably controlled.  Cholesterol last assessed about 2 years ago.  Will reassess due to vascular disease risk.  Review of chart indicates he has had some elevated blood sugar readings the circumstances regarding fasting are uncertain, we will obtain an A1c with future laboratory testing.  Today we talked about appropriate immunizations.  He did have a Cologuard test performed 1 year ago it was negative we will consider rescreening again in 2 years from now.  Discussed routine dental care and eye care.  Reviewed other aspects of routine health mention he has no specific complaints today.            Health Care Directive  Patient does not have a Health Care Directive or Living Will: Advance Directive received and scanned. Click on Code in the patient header to view.    HPI  Do you have a current opioid prescription? no  Do you use any other controlled substances or medications that are not prescribed by a provider? no             9/12/2024   General Health   How would you rate your overall physical health? Good   Feel stress (tense, anxious, or unable to sleep) Only a little       (!) STRESS CONCERN      9/12/2024   Nutrition   Diet: Vegetarian/vegan            9/12/2024   Exercise   Days per week of moderate/strenous exercise 3 days            9/12/2024   Social Factors   Frequency of gathering with friends or relatives More than three times a week   Worry food won't last until get money to buy more No   Food not last or not have enough money for food? No   Do you have housing? (Housing is defined as stable permanent housing and does not include staying ouside in a car, in a tent, in an abandoned building, in an overnight shelter, or couch-surfing.) Yes   Are you worried about losing your housing? No   Lack of transportation? No   Unable to get utilities (heat,electricity)? No            9/17/2024   Fall Risk   Fallen 2 or more times in the past year? No    No   Trouble with walking or balance? No    No       Multiple values from one day are sorted in reverse-chronological order          9/12/2024   Activities of Daily Living- Home Safety   Needs help with the following daily activites None of the above   Safety concerns in the home None of the above            9/12/2024   Dental   Dentist two times every year? Yes            9/12/2024   Hearing Screening   Hearing concerns? None of the above            9/12/2024   Driving Risk Screening   Patient/family members have concerns about driving No            9/12/2024   General Alertness/Fatigue Screening   Have you been more tired than usual lately? No            9/12/2024   Urinary Incontinence Screening   Bothered by leaking urine in past 6 months No            9/12/2024   TB Screening   Were you born outside of the [place]? Yes            Today's PHQ-2 Score:       9/17/2024     2:41 PM   PHQ-2 ( 1999 [employer])   Q1: Little interest or pleasure in doing things 0   Q2: Feeling down, depressed or hopeless 0   PHQ-2 Score 0   Q1: Little interest or pleasure in doing things Not at all   Q2: Feeling down, depressed or hopeless Not at all   PHQ-2 Score  0           9/12/2024   Substance Use   Alcohol more than 3/day or more than 7/wk No   Do you have a current opioid prescription? No   How severe/bad is pain from 1 to 10? 2/10   Do you use any other substances recreationally? No        Social History     Tobacco Use    Smoking status: Never    Smokeless tobacco: Never   Vaping Use    Vaping status: Never Used           9/12/2024   AAA Screening   Family history of Abdominal Aortic Aneurysm (AAA)? No      ASCVD Risk   The 10-year ASCVD risk score (Quynh GREENBERG, et al., 2019) is: 28.8%    Values used to calculate the score:      Age: 74 years      Sex: Male      Is Non- : No      Diabetic: No      Tobacco smoker: No      Systolic Blood Pressure: 138 mmHg      Is BP treated: Yes      HDL Cholesterol: 48 mg/dL      Total Cholesterol: 170 mg/dL            Reviewed and updated as needed this visit by Provider                      Current providers sharing in care for this patient include:  Patient Care Team:  William Meng MD as PCP - General (Family Medicine)  William Meng MD as Assigned PCP  Reed Bright MD (Internal Medicine)  Reed Bright MD as Assigned Cancer Care Provider  Angelika Herron RN as Specialty Care Coordinator (Hematology & Oncology)    The following health maintenance items are reviewed in Epic and correct as of today:  Health Maintenance   Topic Date Due    ZOSTER IMMUNIZATION (1 of 2) Never done    RSV VACCINE (1 - Risk 60-74 years 1-dose series) Never done    ANNUAL REVIEW OF HM ORDERS  07/30/2022    INFLUENZA VACCINE (1) 09/01/2024    COVID-19 Vaccine (9 - 2024-25 season) 09/01/2024    MEDICARE ANNUAL WELLNESS VISIT  08/22/2024    FALL RISK ASSESSMENT  09/17/2025    COLORECTAL CANCER SCREENING  09/11/2026    LIPID  06/27/2027    GLUCOSE  07/10/2027    ADVANCE CARE PLANNING  08/22/2028    DTAP/TDAP/TD IMMUNIZATION (2 - Td or Tdap) 07/30/2031    HEPATITIS C SCREENING  Completed    PHQ-2 (once per  "calendar year)  Completed    Pneumococcal Vaccine: 65+ Years  Completed    HPV IMMUNIZATION  Aged Out    MENINGITIS IMMUNIZATION  Aged Out    RSV MONOCLONAL ANTIBODY  Aged Out       Complete review of systems is obtained.  Other than the specific considerations noted above complete review of systems is negative.       Objective    Exam  /80   Pulse 78   Temp 98  F (36.7  C)   Resp 18   Ht 1.753 m (5' 9\")   Wt 100.2 kg (220 lb 14.4 oz)   SpO2 98%   BMI 32.62 kg/m     Estimated body mass index is 32.62 kg/m  as calculated from the following:    Height as of this encounter: 1.753 m (5' 9\").    Weight as of this encounter: 100.2 kg (220 lb 14.4 oz).    Physical Exam        General Appearance:    Alert, cooperative, no distress   Eyes:   No scleral icterus or conjunctival irritation       Ears:    Normal TM's and external ear canals, both ears   Throat:   Lips, mucosa, and tongue normal; teeth and gums normal   Lungs:     Clear to auscultation bilaterally, respirations unlabored, no wheezes or crackles   Heart:    Regular rate and rhythm,  No murmur   Extremities:  No edema, no joint swelling or redness, no evidence of any injuries   Skin:  No concerning skin findings, no suspicious moles, no rashes   Neurologic:  On gross examination there is no motor or sensory deficit.  Patient walks with a normal gait                   9/17/2024   Mini Cog   Clock Draw Score 2 Normal   3 Item Recall 3 objects recalled   Mini Cog Total Score 5                 Signed Electronically by: William Meng MD, MD    "

## 2024-10-11 ENCOUNTER — LAB (OUTPATIENT)
Dept: INFUSION THERAPY | Facility: HOSPITAL | Age: 74
End: 2024-10-11
Attending: INTERNAL MEDICINE
Payer: COMMERCIAL

## 2024-10-11 DIAGNOSIS — E78.5 DYSLIPIDEMIA: ICD-10-CM

## 2024-10-11 DIAGNOSIS — C61 PRIMARY MALIGNANT NEOPLASM OF PROSTATE (H): ICD-10-CM

## 2024-10-11 DIAGNOSIS — R73.01 ELEVATED FASTING BLOOD SUGAR: ICD-10-CM

## 2024-10-11 LAB
ALBUMIN SERPL BCG-MCNC: 4 G/DL (ref 3.5–5.2)
ALP SERPL-CCNC: 88 U/L (ref 40–150)
ALT SERPL W P-5'-P-CCNC: 24 U/L (ref 0–70)
ANION GAP SERPL CALCULATED.3IONS-SCNC: 11 MMOL/L (ref 7–15)
AST SERPL W P-5'-P-CCNC: 22 U/L (ref 0–45)
BASOPHILS # BLD AUTO: 0 10E3/UL (ref 0–0.2)
BASOPHILS NFR BLD AUTO: 1 %
BILIRUB SERPL-MCNC: 0.5 MG/DL
BUN SERPL-MCNC: 18.3 MG/DL (ref 8–23)
CALCIUM SERPL-MCNC: 9.1 MG/DL (ref 8.8–10.4)
CHLORIDE SERPL-SCNC: 104 MMOL/L (ref 98–107)
CHOLEST SERPL-MCNC: 218 MG/DL
CREAT SERPL-MCNC: 0.69 MG/DL (ref 0.67–1.17)
EGFRCR SERPLBLD CKD-EPI 2021: >90 ML/MIN/1.73M2
EOSINOPHIL # BLD AUTO: 0.2 10E3/UL (ref 0–0.7)
EOSINOPHIL NFR BLD AUTO: 6 %
ERYTHROCYTE [DISTWIDTH] IN BLOOD BY AUTOMATED COUNT: 13.2 % (ref 10–15)
EST. AVERAGE GLUCOSE BLD GHB EST-MCNC: 103 MG/DL
FASTING STATUS PATIENT QL REPORTED: YES
GLUCOSE SERPL-MCNC: 107 MG/DL (ref 70–99)
HBA1C MFR BLD: 5.2 %
HCO3 SERPL-SCNC: 26 MMOL/L (ref 22–29)
HCT VFR BLD AUTO: 40.1 % (ref 40–53)
HDLC SERPL-MCNC: 62 MG/DL
HGB BLD-MCNC: 14.2 G/DL (ref 13.3–17.7)
IMM GRANULOCYTES # BLD: 0 10E3/UL
IMM GRANULOCYTES NFR BLD: 0 %
LDLC SERPL CALC-MCNC: 132 MG/DL
LYMPHOCYTES # BLD AUTO: 0.6 10E3/UL (ref 0.8–5.3)
LYMPHOCYTES NFR BLD AUTO: 13 %
MCH RBC QN AUTO: 32.8 PG (ref 26.5–33)
MCHC RBC AUTO-ENTMCNC: 35.4 G/DL (ref 31.5–36.5)
MCV RBC AUTO: 93 FL (ref 78–100)
MONOCYTES # BLD AUTO: 0.4 10E3/UL (ref 0–1.3)
MONOCYTES NFR BLD AUTO: 10 %
NEUTROPHILS # BLD AUTO: 3.1 10E3/UL (ref 1.6–8.3)
NEUTROPHILS NFR BLD AUTO: 71 %
NONHDLC SERPL-MCNC: 156 MG/DL
NRBC # BLD AUTO: 0 10E3/UL
NRBC BLD AUTO-RTO: 0 /100
PLATELET # BLD AUTO: 145 10E3/UL (ref 150–450)
POTASSIUM SERPL-SCNC: 3.9 MMOL/L (ref 3.4–5.3)
PROT SERPL-MCNC: 6 G/DL (ref 6.4–8.3)
PSA SERPL DL<=0.01 NG/ML-MCNC: <0.01 NG/ML (ref 0–6.5)
RBC # BLD AUTO: 4.33 10E6/UL (ref 4.4–5.9)
SODIUM SERPL-SCNC: 141 MMOL/L (ref 135–145)
TRIGL SERPL-MCNC: 118 MG/DL
WBC # BLD AUTO: 4.4 10E3/UL (ref 4–11)

## 2024-10-11 PROCEDURE — 36415 COLL VENOUS BLD VENIPUNCTURE: CPT

## 2024-10-11 PROCEDURE — 82040 ASSAY OF SERUM ALBUMIN: CPT

## 2024-10-11 PROCEDURE — 80061 LIPID PANEL: CPT

## 2024-10-11 PROCEDURE — 85004 AUTOMATED DIFF WBC COUNT: CPT

## 2024-10-11 PROCEDURE — 83036 HEMOGLOBIN GLYCOSYLATED A1C: CPT

## 2024-10-11 PROCEDURE — 84153 ASSAY OF PSA TOTAL: CPT

## 2024-10-15 ENCOUNTER — ONCOLOGY VISIT (OUTPATIENT)
Dept: ONCOLOGY | Facility: HOSPITAL | Age: 74
End: 2024-10-15
Attending: INTERNAL MEDICINE
Payer: COMMERCIAL

## 2024-10-15 VITALS
TEMPERATURE: 97.6 F | OXYGEN SATURATION: 98 % | WEIGHT: 220 LBS | HEIGHT: 69 IN | BODY MASS INDEX: 32.58 KG/M2 | RESPIRATION RATE: 18 BRPM | DIASTOLIC BLOOD PRESSURE: 67 MMHG | HEART RATE: 68 BPM | SYSTOLIC BLOOD PRESSURE: 143 MMHG

## 2024-10-15 DIAGNOSIS — C61 PRIMARY MALIGNANT NEOPLASM OF PROSTATE (H): Primary | ICD-10-CM

## 2024-10-15 PROCEDURE — 99213 OFFICE O/P EST LOW 20 MIN: CPT

## 2024-10-15 PROCEDURE — G0463 HOSPITAL OUTPT CLINIC VISIT: HCPCS

## 2024-10-15 PROCEDURE — G2211 COMPLEX E/M VISIT ADD ON: HCPCS

## 2024-10-15 ASSESSMENT — PAIN SCALES - GENERAL: PAINLEVEL: MILD PAIN (3)

## 2024-10-15 NOTE — LETTER
10/15/2024      Sage Jones  [address] [facility] [address]  [place] [address]      Dear Colleague,    Thank you for referring your patient, Sage Jones, to the [facility] [place]. Please see a copy of my visit note below.    [facility] Hematology and Oncology Outpatient Progress Note    Patient: Sage Jones  MRN: [id number]  Date of Service: Oct 15, 2024          Reason for Visit    Chief Complaint   Patient presents with     Oncology Clinic Visit     Labs follow up        Primary Hematologist/Oncologist: Dr. Reed Bright        Assessment/Plan  1.  Prostate cancer.  Clinically stable. Not on current treatment as Eligard was placed on hold due to increased pain, fatigue and depression.  CMP unremarkable. PSA remains undetectable. Pt reports that he is doing well. Without evidence for progression or recurrence of prostate cancer, recommend to continue program of surveillance.  We can restart the Eligard when his PSA starts to increase.  Per Dr. Bright, at that time we can also consider using enzalutamide since he did not tolerate abiraterone well.    Recommend continued surveillance  We will continue to see him to check his labs every 3 months.      ______________________________________________________________________________    History of Present Illness/ Interval History    Mr. Sage Jones  is a 74 year old patient who is seen today in follow up for prostate cancer. He is not currently on treatment as it was held due to intolerance. Since being off treatment he reports feeling much better. No new pain. He actually has less pain in feet and tolerates being on his feet longer. Hot flashes still happening, but not as often and don't last as long.  He is eating and drinking well, denies any new health concerns today.      ECOG performance status   0- Fully active, without restriction        Pain  Pain Score: Mild Pain (3)  Pain Loc: Other - see comment    ROS  A  "14 point review of systems was obtained.  Positive findings noted in the history.  The remainder of the review of system is otherwise negative.      Oncology History/Treatment  Diagnosis:  Prostate cancer: Diagnosed June 2022. At the time of diagnosis there was suspicion of marek metastases in the bilateral iliac nodes.  Initial MRI showed a PI-RADS 5 lesion measuring 4 x 5 x 6.3 cm.  There was extracapsular extension involving the rectal wall and external inner sphincter.  Also question of bilateral iliac marek mets with the largest measuring 7.6 mm.  TURBT revealed a [place] 4+5 with extraprostatic extension and perineural invasion. He was staged as 3C (cT3a cN0 cM0) grade group 5.  A PSMA PET scan September 1, 2022 showed disease confined to the prostate.  PSA was 92.  Stage T3a.     Treatment:  He initially received Firmagon and then Eligard (6 month), abiraterone and prednisone in October 2022.  Radiation was started November 21, 2022.   Abiraterone and prednisone stopped in Sept., 2023 secondary to depression and extreme weakness.      Physical Exam    BP (!) 143/67 (BP Location: Right arm, Patient Position: Sitting, Cuff Size: Adult Regular)   Pulse 68   Temp 97.6  F (36.4  C) (Oral)   Resp 18   Ht 1.753 m (5' 9\")   Wt 99.8 kg (220 lb)   SpO2 98%   BMI 32.49 kg/m      GENERAL: no acute distress. Cooperative in conversation.   HEENT: pupils are equal, round and reactive. Oral mucosa is moist and intact.  RESP:Chest symmetric. Regular respiratory rate. No stridor.  ABD: Nondistended, soft.  EXTREMITIES: trace bilateral lower extremity edema  NEURO: non focal. Alert and oriented x3.   PSYCH: within normal limits. No depression or anxiety.  SKIN: warm dry intact      Lab Results    Recent Results (from the past 168 hour(s))   Comprehensive metabolic panel   Result Value Ref Range    Sodium 141 135 - 145 mmol/L    Potassium 3.9 3.4 - 5.3 mmol/L    Carbon Dioxide (CO2) 26 22 - 29 mmol/L    Anion Gap 11 7 - " 15 mmol/L    Urea Nitrogen 18.3 8.0 - 23.0 mg/dL    Creatinine 0.69 0.67 - 1.17 mg/dL    GFR Estimate >90 >60 mL/min/1.73m2    Calcium 9.1 8.8 - 10.4 mg/dL    Chloride 104 98 - 107 mmol/L    Glucose 107 (H) 70 - 99 mg/dL    Alkaline Phosphatase 88 40 - 150 U/L    AST 22 0 - 45 U/L    ALT 24 0 - 70 U/L    Protein Total 6.0 (L) 6.4 - 8.3 g/dL    Albumin 4.0 3.5 - 5.2 g/dL    Bilirubin Total 0.5 <=1.2 mg/dL   PSA, tumor marker   Result Value Ref Range    PSA Tumor Marker <0.01 0.00 - 6.50 ng/mL   CBC with platelets and differential   Result Value Ref Range    WBC Count 4.4 4.0 - 11.0 10e3/uL    RBC Count 4.33 (L) 4.40 - 5.90 10e6/uL    Hemoglobin 14.2 13.3 - 17.7 g/dL    Hematocrit 40.1 40.0 - 53.0 %    MCV 93 78 - 100 fL    MCH 32.8 26.5 - 33.0 pg    MCHC 35.4 31.5 - 36.5 g/dL    RDW 13.2 10.0 - 15.0 %    Platelet Count 145 (L) 150 - 450 10e3/uL    % Neutrophils 71 %    % Lymphocytes 13 %    % Monocytes 10 %    % Eosinophils 6 %    % Basophils 1 %    % Immature Granulocytes 0 %    NRBCs per 100 WBC 0 <1 /100    Absolute Neutrophils 3.1 1.6 - 8.3 10e3/uL    Absolute Lymphocytes 0.6 (L) 0.8 - 5.3 10e3/uL    Absolute Monocytes 0.4 0.0 - 1.3 10e3/uL    Absolute Eosinophils 0.2 0.0 - 0.7 10e3/uL    Absolute Basophils 0.0 0.0 - 0.2 10e3/uL    Absolute Immature Granulocytes 0.0 <=0.4 10e3/uL    Absolute NRBCs 0.0 10e3/uL   Lipid panel reflex to direct LDL Fasting   Result Value Ref Range    Cholesterol 218 (H) <200 mg/dL    Triglycerides 118 <150 mg/dL    Direct Measure HDL 62 >=40 mg/dL    LDL Cholesterol Calculated 132 (H) <100 mg/dL    Non HDL Cholesterol 156 (H) <130 mg/dL    Patient Fasting > 8hrs? Yes    Hemoglobin A1c   Result Value Ref Range    Estimated Average Glucose 103 <117 mg/dL    Hemoglobin A1C 5.2 <5.7 %     I reviewed the above labs today.  Imaging    No results found.      Billing  Total time 26 minutes, to include face to face visit, review of EMR, ordering, documentation and coordination of care on date  "of service. The longitudinal plan of care for the diagnosis(es)/condition(s) as documented were addressed during this visit. Due to the added complexity in care, I will continue to support Rich in the subsequent management and with ongoing continuity of care.    Signed by: GERTRUDE Gonzalez CNP        Chart documentation with Dragon Voice recognition Software. Although reviewed after completion, some words and grammatical errors may remain.      Oncology Rooming Note    October 15, 2024 11:25 AM   Sage Jones is a 74 year old male who presents for:    Chief Complaint   Patient presents with     Oncology Clinic Visit     Labs follow up      Initial Vitals: BP (!) 143/67 (BP Location: Right arm, Patient Position: Sitting, Cuff Size: Adult Regular)   Pulse 68   Temp 97.6  F (36.4  C) (Oral)   Resp 18   Ht 1.753 m (5' 9\")   Wt 99.8 kg (220 lb)   SpO2 98%   BMI 32.49 kg/m   Estimated body mass index is 32.49 kg/m  as calculated from the following:    Height as of this encounter: 1.753 m (5' 9\").    Weight as of this encounter: 99.8 kg (220 lb). Body surface area is 2.2 meters squared.  Mild Pain (3) Comment: Data Unavailable   No LMP for male patient.  Allergies reviewed: Yes  Medications reviewed: Yes    Medications: Medication refills not needed today.  Pharmacy name entered into [facility]: [facility] [address] [place] [address]    Frailty Screening:   Is the patient here for a new oncology consult visit in cancer care? 2. No      Clinical concerns: labs follow up        Bessy Cameron MA                Again, thank you for allowing me to participate in the care of your patient.        Sincerely,        GERTRUDE Gonzalez CNP  "

## 2024-10-15 NOTE — PROGRESS NOTES
[facility] Hematology and Oncology Outpatient Progress Note    Patient: Sage Jones  MRN: [id number]  Date of Service: Oct 15, 2024          Reason for Visit    Chief Complaint   Patient presents with    Oncology Clinic Visit     Labs follow up        Primary Hematologist/Oncologist: Dr. Reed Bright        Assessment/Plan  1.  Prostate cancer.  Clinically stable. Not on current treatment as Eligard was placed on hold due to increased pain, fatigue and depression.  CMP unremarkable. PSA remains undetectable. Pt reports that he is doing well. Without evidence for progression or recurrence of prostate cancer, recommend to continue program of surveillance.  We can restart the Eligard when his PSA starts to increase.  Per Dr. Bright, at that time we can also consider using enzalutamide since he did not tolerate abiraterone well.    Recommend continued surveillance  We will continue to see him to check his labs every 3 months.      ______________________________________________________________________________    History of Present Illness/ Interval History    Mr. Sage Jones  is a 74 year old patient who is seen today in follow up for prostate cancer. He is not currently on treatment as it was held due to intolerance. Since being off treatment he reports feeling much better. No new pain. He actually has less pain in feet and tolerates being on his feet longer. Hot flashes still happening, but not as often and don't last as long.  He is eating and drinking well, denies any new health concerns today.      ECOG performance status   0- Fully active, without restriction        Pain  Pain Score: Mild Pain (3)  Pain Loc: Other - see comment    ROS  A 14 point review of systems was obtained.  Positive findings noted in the history.  The remainder of the review of system is otherwise negative.      Oncology History/Treatment  Diagnosis:  Prostate cancer: Diagnosed June 2022. At the time of diagnosis there was  "suspicion of marek metastases in the bilateral iliac nodes.  Initial MRI showed a PI-RADS 5 lesion measuring 4 x 5 x 6.3 cm.  There was extracapsular extension involving the rectal wall and external inner sphincter.  Also question of bilateral iliac marek mets with the largest measuring 7.6 mm.  TURBT revealed a [place] 4+5 with extraprostatic extension and perineural invasion. He was staged as 3C (cT3a cN0 cM0) grade group 5.  A PSMA PET scan September 1, 2022 showed disease confined to the prostate.  PSA was 92.  Stage T3a.     Treatment:  He initially received Firmagon and then Eligard (6 month), abiraterone and prednisone in October 2022.  Radiation was started November 21, 2022.   Abiraterone and prednisone stopped in Sept., 2023 secondary to depression and extreme weakness.      Physical Exam    BP (!) 143/67 (BP Location: Right arm, Patient Position: Sitting, Cuff Size: Adult Regular)   Pulse 68   Temp 97.6  F (36.4  C) (Oral)   Resp 18   Ht 1.753 m (5' 9\")   Wt 99.8 kg (220 lb)   SpO2 98%   BMI 32.49 kg/m      GENERAL: no acute distress. Cooperative in conversation.   HEENT: pupils are equal, round and reactive. Oral mucosa is moist and intact.  RESP:Chest symmetric. Regular respiratory rate. No stridor.  ABD: Nondistended, soft.  EXTREMITIES: trace bilateral lower extremity edema  NEURO: non focal. Alert and oriented x3.   PSYCH: within normal limits. No depression or anxiety.  SKIN: warm dry intact      Lab Results    Recent Results (from the past 168 hour(s))   Comprehensive metabolic panel   Result Value Ref Range    Sodium 141 135 - 145 mmol/L    Potassium 3.9 3.4 - 5.3 mmol/L    Carbon Dioxide (CO2) 26 22 - 29 mmol/L    Anion Gap 11 7 - 15 mmol/L    Urea Nitrogen 18.3 8.0 - 23.0 mg/dL    Creatinine 0.69 0.67 - 1.17 mg/dL    GFR Estimate >90 >60 mL/min/1.73m2    Calcium 9.1 8.8 - 10.4 mg/dL    Chloride 104 98 - 107 mmol/L    Glucose 107 (H) 70 - 99 mg/dL    Alkaline Phosphatase 88 40 - 150 U/L    " AST 22 0 - 45 U/L    ALT 24 0 - 70 U/L    Protein Total 6.0 (L) 6.4 - 8.3 g/dL    Albumin 4.0 3.5 - 5.2 g/dL    Bilirubin Total 0.5 <=1.2 mg/dL   PSA, tumor marker   Result Value Ref Range    PSA Tumor Marker <0.01 0.00 - 6.50 ng/mL   CBC with platelets and differential   Result Value Ref Range    WBC Count 4.4 4.0 - 11.0 10e3/uL    RBC Count 4.33 (L) 4.40 - 5.90 10e6/uL    Hemoglobin 14.2 13.3 - 17.7 g/dL    Hematocrit 40.1 40.0 - 53.0 %    MCV 93 78 - 100 fL    MCH 32.8 26.5 - 33.0 pg    MCHC 35.4 31.5 - 36.5 g/dL    RDW 13.2 10.0 - 15.0 %    Platelet Count 145 (L) 150 - 450 10e3/uL    % Neutrophils 71 %    % Lymphocytes 13 %    % Monocytes 10 %    % Eosinophils 6 %    % Basophils 1 %    % Immature Granulocytes 0 %    NRBCs per 100 WBC 0 <1 /100    Absolute Neutrophils 3.1 1.6 - 8.3 10e3/uL    Absolute Lymphocytes 0.6 (L) 0.8 - 5.3 10e3/uL    Absolute Monocytes 0.4 0.0 - 1.3 10e3/uL    Absolute Eosinophils 0.2 0.0 - 0.7 10e3/uL    Absolute Basophils 0.0 0.0 - 0.2 10e3/uL    Absolute Immature Granulocytes 0.0 <=0.4 10e3/uL    Absolute NRBCs 0.0 10e3/uL   Lipid panel reflex to direct LDL Fasting   Result Value Ref Range    Cholesterol 218 (H) <200 mg/dL    Triglycerides 118 <150 mg/dL    Direct Measure HDL 62 >=40 mg/dL    LDL Cholesterol Calculated 132 (H) <100 mg/dL    Non HDL Cholesterol 156 (H) <130 mg/dL    Patient Fasting > 8hrs? Yes    Hemoglobin A1c   Result Value Ref Range    Estimated Average Glucose 103 <117 mg/dL    Hemoglobin A1C 5.2 <5.7 %     I reviewed the above labs today.  Imaging    No results found.      Billing  Total time 26 minutes, to include face to face visit, review of EMR, ordering, documentation and coordination of care on date of service. The longitudinal plan of care for the diagnosis(es)/condition(s) as documented were addressed during this visit. Due to the added complexity in care, I will continue to support Rich in the subsequent management and with ongoing continuity of  care.    Signed by: GERTRUDE Gonzalez CNP        Chart documentation with Dragon Voice recognition Software. Although reviewed after completion, some words and grammatical errors may remain.

## 2024-10-15 NOTE — PROGRESS NOTES
"Oncology Rooming Note    October 15, 2024 11:25 AM   Sage Jones is a 74 year old male who presents for:    Chief Complaint   Patient presents with    Oncology Clinic Visit     Labs follow up      Initial Vitals: BP (!) 143/67 (BP Location: Right arm, Patient Position: Sitting, Cuff Size: Adult Regular)   Pulse 68   Temp 97.6  F (36.4  C) (Oral)   Resp 18   Ht 1.753 m (5' 9\")   Wt 99.8 kg (220 lb)   SpO2 98%   BMI 32.49 kg/m   Estimated body mass index is 32.49 kg/m  as calculated from the following:    Height as of this encounter: 1.753 m (5' 9\").    Weight as of this encounter: 99.8 kg (220 lb). Body surface area is 2.2 meters squared.  Mild Pain (3) Comment: Data Unavailable   No LMP for male patient.  Allergies reviewed: Yes  Medications reviewed: Yes    Medications: Medication refills not needed today.  Pharmacy name entered into [employer]: [facility] [address] [place] [address]    Frailty Screening:   Is the patient here for a new oncology consult visit in cancer care? 2. No      Clinical concerns: labs follow up        Bessy Cameron MA              "

## 2024-12-02 DIAGNOSIS — I10 ESSENTIAL HYPERTENSION: ICD-10-CM

## 2024-12-02 DIAGNOSIS — R33.9 URINARY RETENTION: ICD-10-CM

## 2024-12-02 RX ORDER — AMLODIPINE BESYLATE 5 MG/1
TABLET ORAL
Qty: 90 TABLET | Refills: 3 | Status: SHIPPED | OUTPATIENT
Start: 2024-12-02

## 2024-12-02 RX ORDER — TAMSULOSIN HYDROCHLORIDE 0.4 MG/1
0.8 CAPSULE ORAL DAILY
Qty: 180 CAPSULE | Refills: 3 | Status: SHIPPED | OUTPATIENT
Start: 2024-12-02

## 2025-01-08 ENCOUNTER — LAB (OUTPATIENT)
Dept: INFUSION THERAPY | Facility: HOSPITAL | Age: 75
End: 2025-01-08
Attending: INTERNAL MEDICINE
Payer: COMMERCIAL

## 2025-01-08 DIAGNOSIS — C61 PRIMARY MALIGNANT NEOPLASM OF PROSTATE (H): ICD-10-CM

## 2025-01-08 LAB
ALBUMIN SERPL BCG-MCNC: 4 G/DL (ref 3.5–5.2)
ALP SERPL-CCNC: 103 U/L (ref 40–150)
ALT SERPL W P-5'-P-CCNC: 18 U/L (ref 0–70)
ANION GAP SERPL CALCULATED.3IONS-SCNC: 12 MMOL/L (ref 7–15)
AST SERPL W P-5'-P-CCNC: 21 U/L (ref 0–45)
BILIRUB SERPL-MCNC: 0.5 MG/DL
BUN SERPL-MCNC: 12.9 MG/DL (ref 8–23)
CALCIUM SERPL-MCNC: 9 MG/DL (ref 8.8–10.4)
CHLORIDE SERPL-SCNC: 104 MMOL/L (ref 98–107)
CREAT SERPL-MCNC: 0.68 MG/DL (ref 0.67–1.17)
EGFRCR SERPLBLD CKD-EPI 2021: >90 ML/MIN/1.73M2
GLUCOSE SERPL-MCNC: 112 MG/DL (ref 70–99)
HCO3 SERPL-SCNC: 23 MMOL/L (ref 22–29)
POTASSIUM SERPL-SCNC: 3.7 MMOL/L (ref 3.4–5.3)
PROT SERPL-MCNC: 6.1 G/DL (ref 6.4–8.3)
PSA SERPL DL<=0.01 NG/ML-MCNC: <0.01 NG/ML (ref 0–6.5)
SODIUM SERPL-SCNC: 139 MMOL/L (ref 135–145)

## 2025-01-08 PROCEDURE — 80053 COMPREHEN METABOLIC PANEL: CPT

## 2025-01-08 PROCEDURE — 84153 ASSAY OF PSA TOTAL: CPT

## 2025-01-08 PROCEDURE — 36415 COLL VENOUS BLD VENIPUNCTURE: CPT

## 2025-01-14 NOTE — PROGRESS NOTES
[facility] Hematology and Oncology Outpatient Progress Note    Patient: Sage Jones  MRN: [id number]  Date of Service: Aman 15, 2025          Reason for Visit    Chief Complaint   Patient presents with    Oncology Clinic Visit     Primary malignant neoplasm of prostate (H)       Primary Hematologist/Oncologist: Dr. Reed Bright        Assessment/Plan  1.  Prostate cancer.  Clinically stable. Not on current treatment as Eligard was placed on hold due to increased pain, fatigue and depression.  CMP unremarkable. PSA remains undetectable. Pt reports that he is doing well. Without evidence for progression or recurrence of prostate cancer, recommend to continue program of surveillance.  We can restart the Eligard when his PSA starts to increase.  Per Dr. Bright, at that time we can also consider using enzalutamide since he did not tolerate abiraterone well.    Recommend continued surveillance  We will continue to check his labs every 3 months.    Recommend follow-up in 6 months with provider + labs done a few days prior.  Patient instructed to notify this clinic with any questions, or concerns. He will also let us know if he is experiencing any new, persistent or focal bone pain.     ______________________________________________________________________________    History of Present Illness/ Interval History    Mr. Sage Jones  is a 74 year old patient who is seen today in follow up for prostate cancer. He is not currently on treatment as it was held due to intolerance. Since being off treatment he reports feeling much better. He reports that he had covid about 5 weeks ago and is now getting back into his groove with exercising again as it set him back some. He notes that he is getting less and less hot flashes, and with less duration. He denies any new pain. He is eating and drinking well, and denies any new health concerns today.      ECOG performance status   0- Fully active, without restriction   "      Pain       ROS  A 14 point review of systems was obtained.  Positive findings noted in the history.  The remainder of the review of system is otherwise negative.      Oncology History/Treatment  Diagnosis:  Prostate cancer: Diagnosed June 2022. At the time of diagnosis there was suspicion of marek metastases in the bilateral iliac nodes.  Initial MRI showed a PI-RADS 5 lesion measuring 4 x 5 x 6.3 cm.  There was extracapsular extension involving the rectal wall and external inner sphincter.  Also question of bilateral iliac marek mets with the largest measuring 7.6 mm.  TURBT revealed a [place] 4+5 with extraprostatic extension and perineural invasion. He was staged as 3C (cT3a cN0 cM0) grade group 5.  A PSMA PET scan September 1, 2022 showed disease confined to the prostate.  PSA was 92.  Stage T3a.     Treatment:  He initially received Firmagon and then Eligard (6 month), abiraterone and prednisone in October 2022.  Radiation was started November 21, 2022.   Abiraterone and prednisone stopped in Sept., 2023 secondary to depression and extreme weakness.      Physical Exam    Ht 1.753 m (5' 9.02\")   BMI 32.47 kg/m      GENERAL: no acute distress. Cooperative in conversation.   HEENT: pupils are equal, round and reactive. Oral mucosa is moist and intact.  RESP:Chest symmetric. Regular respiratory rate. No stridor.  ABD: Nondistended, soft.  EXTREMITIES: trace bilateral lower extremity edema  NEURO: non focal. Alert and oriented x3.   PSYCH: within normal limits. No depression or anxiety.  SKIN: warm dry intact      Lab Results    Recent Results (from the past week)   Comprehensive metabolic panel   Result Value Ref Range    Sodium 139 135 - 145 mmol/L    Potassium 3.7 3.4 - 5.3 mmol/L    Carbon Dioxide (CO2) 23 22 - 29 mmol/L    Anion Gap 12 7 - 15 mmol/L    Urea Nitrogen 12.9 8.0 - 23.0 mg/dL    Creatinine 0.68 0.67 - 1.17 mg/dL    GFR Estimate >90 >60 mL/min/1.73m2    Calcium 9.0 8.8 - 10.4 mg/dL    Chloride " 104 98 - 107 mmol/L    Glucose 112 (H) 70 - 99 mg/dL    Alkaline Phosphatase 103 40 - 150 U/L    AST 21 0 - 45 U/L    ALT 18 0 - 70 U/L    Protein Total 6.1 (L) 6.4 - 8.3 g/dL    Albumin 4.0 3.5 - 5.2 g/dL    Bilirubin Total 0.5 <=1.2 mg/dL   PSA, tumor marker   Result Value Ref Range    PSA Tumor Marker <0.01 0.00 - 6.50 ng/mL     I reviewed the above labs today.  Imaging    No results found.      Billing  Total time 26 minutes, to include face to face visit, review of EMR, ordering, documentation and coordination of care on date of service. The longitudinal plan of care for the diagnosis(es)/condition(s) as documented were addressed during this visit. Due to the added complexity in care, I will continue to support Rich in the subsequent management and with ongoing continuity of care.    Signed by: GERTRUDE Gonzalez CNP        Chart documentation with Dragon Voice recognition Software. Although reviewed after completion, some words and grammatical errors may remain.

## 2025-01-15 ENCOUNTER — ONCOLOGY VISIT (OUTPATIENT)
Dept: ONCOLOGY | Facility: HOSPITAL | Age: 75
End: 2025-01-15
Attending: INTERNAL MEDICINE
Payer: COMMERCIAL

## 2025-01-15 VITALS
OXYGEN SATURATION: 100 % | HEIGHT: 69 IN | BODY MASS INDEX: 32.35 KG/M2 | SYSTOLIC BLOOD PRESSURE: 135 MMHG | RESPIRATION RATE: 20 BRPM | DIASTOLIC BLOOD PRESSURE: 70 MMHG | WEIGHT: 218.4 LBS | HEART RATE: 62 BPM | TEMPERATURE: 97.9 F

## 2025-01-15 DIAGNOSIS — C61 PRIMARY MALIGNANT NEOPLASM OF PROSTATE (H): Primary | ICD-10-CM

## 2025-01-15 PROCEDURE — G2211 COMPLEX E/M VISIT ADD ON: HCPCS

## 2025-01-15 PROCEDURE — G0463 HOSPITAL OUTPT CLINIC VISIT: HCPCS

## 2025-01-15 PROCEDURE — 99213 OFFICE O/P EST LOW 20 MIN: CPT

## 2025-01-15 ASSESSMENT — PAIN SCALES - GENERAL: PAINLEVEL_OUTOF10: NO PAIN (0)

## 2025-01-15 NOTE — LETTER
1/15/2025      Sage Jones  [address]  [place] [address]      Dear Colleague,    Thank you for referring your patient, Sage Jones, to the [facility] CANCER [facility]. Please see a copy of my visit note below.    [facility] Hematology and Oncology Outpatient Progress Note    Patient: Sage Jones  MRN: [id number]  Date of Service: Aman 15, 2025          Reason for Visit    Chief Complaint   Patient presents with     Oncology Clinic Visit     Primary malignant neoplasm of prostate (H)       Primary Hematologist/Oncologist: Dr. Reed Bright        Assessment/Plan  1.  Prostate cancer.  Clinically stable. Not on current treatment as Eligard was placed on hold due to increased pain, fatigue and depression.  CMP unremarkable. PSA remains undetectable. Pt reports that he is doing well. Without evidence for progression or recurrence of prostate cancer, recommend to continue program of surveillance.  We can restart the Eligard when his PSA starts to increase.  Per Dr. Bright, at that time we can also consider using enzalutamide since he did not tolerate abiraterone well.    Recommend continued surveillance  We will continue to check his labs every 3 months.    Recommend follow-up in 6 months with provider + labs done a few days prior.  Patient instructed to notify this clinic with any questions, or concerns. He will also let us know if he is experiencing any new, persistent or focal bone pain.     ______________________________________________________________________________    History of Present Illness/ Interval History    Mr. Sage Jones  is a 74 year old patient who is seen today in follow up for prostate cancer. He is not currently on treatment as it was held due to intolerance. Since being off treatment he reports feeling much better. He reports that he had covid about 5 weeks ago and is now getting back into his groove with exercising again as it set him  "back some. He notes that he is getting less and less hot flashes, and with less duration. He denies any new pain. He is eating and drinking well, and denies any new health concerns today.      ECOG performance status   0- Fully active, without restriction        Pain       ROS  A 14 point review of systems was obtained.  Positive findings noted in the history.  The remainder of the review of system is otherwise negative.      Oncology History/Treatment  Diagnosis:  Prostate cancer: Diagnosed June 2022. At the time of diagnosis there was suspicion of marek metastases in the bilateral iliac nodes.  Initial MRI showed a PI-RADS 5 lesion measuring 4 x 5 x 6.3 cm.  There was extracapsular extension involving the rectal wall and external inner sphincter.  Also question of bilateral iliac marek mets with the largest measuring 7.6 mm.  TURBT revealed a Macarena 4+5 with extraprostatic extension and perineural invasion. He was staged as 3C (cT3a cN0 cM0) grade group 5.  A PSMA PET scan September 1, 2022 showed disease confined to the prostate.  PSA was 92.  Stage T3a.     Treatment:  He initially received Firmagon and then Eligard (6 month), abiraterone and prednisone in October 2022.  Radiation was started November 21, 2022.   Abiraterone and prednisone stopped in Sept., 2023 secondary to depression and extreme weakness.      Physical Exam    Ht 1.753 m (5' 9.02\")   BMI 32.47 kg/m      GENERAL: no acute distress. Cooperative in conversation.   HEENT: pupils are equal, round and reactive. Oral mucosa is moist and intact.  RESP:Chest symmetric. Regular respiratory rate. No stridor.  ABD: Nondistended, soft.  EXTREMITIES: trace bilateral lower extremity edema  NEURO: non focal. Alert and oriented x3.   PSYCH: within normal limits. No depression or anxiety.  SKIN: warm dry intact      Lab Results    Recent Results (from the past week)   Comprehensive metabolic panel   Result Value Ref Range    Sodium 139 135 - 145 mmol/L    " "Potassium 3.7 3.4 - 5.3 mmol/L    Carbon Dioxide (CO2) 23 22 - 29 mmol/L    Anion Gap 12 7 - 15 mmol/L    Urea Nitrogen 12.9 8.0 - 23.0 mg/dL    Creatinine 0.68 0.67 - 1.17 mg/dL    GFR Estimate >90 >60 mL/min/1.73m2    Calcium 9.0 8.8 - 10.4 mg/dL    Chloride 104 98 - 107 mmol/L    Glucose 112 (H) 70 - 99 mg/dL    Alkaline Phosphatase 103 40 - 150 U/L    AST 21 0 - 45 U/L    ALT 18 0 - 70 U/L    Protein Total 6.1 (L) 6.4 - 8.3 g/dL    Albumin 4.0 3.5 - 5.2 g/dL    Bilirubin Total 0.5 <=1.2 mg/dL   PSA, tumor marker   Result Value Ref Range    PSA Tumor Marker <0.01 0.00 - 6.50 ng/mL     I reviewed the above labs today.  Imaging    No results found.      Billing  Total time 26 minutes, to include face to face visit, review of EMR, ordering, documentation and coordination of care on date of service. The longitudinal plan of care for the diagnosis(es)/condition(s) as documented were addressed during this visit. Due to the added complexity in care, I will continue to support Rich in the subsequent management and with ongoing continuity of care.    Signed by: GERTRUDE Gonzalez CNP        Chart documentation with Dragon Voice recognition Software. Although reviewed after completion, some words and grammatical errors may remain.      Oncology Rooming Note    January 15, 2025 1:14 PM   Sage Jones is a 74 year old male who presents for:    Chief Complaint   Patient presents with     Oncology Clinic Visit     Primary malignant neoplasm of prostate (H)     Initial Vitals: /70 (BP Location: Left arm, Patient Position: Sitting, Cuff Size: Adult Regular)   Pulse 62   Temp 97.9  F (36.6  C) (Oral)   Resp 20   Ht 1.753 m (5' 9.02\")   Wt 99.1 kg (218 lb 6.4 oz)   SpO2 100%   BMI 32.24 kg/m   Estimated body mass index is 32.24 kg/m  as calculated from the following:    Height as of this encounter: 1.753 m (5' 9.02\").    Weight as of this encounter: 99.1 kg (218 lb 6.4 oz). Body surface area is 2.2 meters " squared.  No Pain (0) Comment: Data Unavailable   No LMP for male patient.  Allergies reviewed: Yes  Medications reviewed: Yes    Medications: Medication refills not needed today.  Pharmacy name entered into [employer]: [facility] [address] [place] [address]    Frailty Screening:   Is the patient here for a new oncology consult visit in cancer care? 2. No      Clinical concerns: Prostate ca 3 mo follow up       Holly Kemp MA                Again, thank you for allowing me to participate in the care of your patient.        Sincerely,        GERTRUDE Gonzalez CNP    Electronically signed

## 2025-01-15 NOTE — PROGRESS NOTES
"Oncology Rooming Note    January 15, 2025 1:14 PM   Sage Jones is a 74 year old male who presents for:    Chief Complaint   Patient presents with    Oncology Clinic Visit     Primary malignant neoplasm of prostate (H)     Initial Vitals: /70 (BP Location: Left arm, Patient Position: Sitting, Cuff Size: Adult Regular)   Pulse 62   Temp 97.9  F (36.6  C) (Oral)   Resp 20   Ht 1.753 m (5' 9.02\")   Wt 99.1 kg (218 lb 6.4 oz)   SpO2 100%   BMI 32.24 kg/m   Estimated body mass index is 32.24 kg/m  as calculated from the following:    Height as of this encounter: 1.753 m (5' 9.02\").    Weight as of this encounter: 99.1 kg (218 lb 6.4 oz). Body surface area is 2.2 meters squared.  No Pain (0) Comment: Data Unavailable   No LMP for male patient.  Allergies reviewed: Yes  Medications reviewed: Yes    Medications: Medication refills not needed today.  Pharmacy name entered into [employer]: [facility] [address] [place] [address]    Frailty Screening:   Is the patient here for a new oncology consult visit in cancer care? 2. No      Clinical concerns: Prostate ca 3 mo follow up       Holly Kemp MA              "

## 2025-04-15 ENCOUNTER — LAB (OUTPATIENT)
Dept: INFUSION THERAPY | Facility: HOSPITAL | Age: 75
End: 2025-04-15
Attending: INTERNAL MEDICINE
Payer: COMMERCIAL

## 2025-04-15 DIAGNOSIS — C61 PRIMARY MALIGNANT NEOPLASM OF PROSTATE (H): ICD-10-CM

## 2025-04-15 LAB
ALBUMIN SERPL BCG-MCNC: 4.1 G/DL (ref 3.5–5.2)
ALP SERPL-CCNC: 104 U/L (ref 40–150)
ALT SERPL W P-5'-P-CCNC: 16 U/L (ref 0–70)
ANION GAP SERPL CALCULATED.3IONS-SCNC: 14 MMOL/L (ref 7–15)
AST SERPL W P-5'-P-CCNC: 18 U/L (ref 0–45)
BASOPHILS # BLD AUTO: 0.1 10E3/UL (ref 0–0.2)
BASOPHILS NFR BLD AUTO: 1 %
BILIRUB SERPL-MCNC: 0.5 MG/DL
BUN SERPL-MCNC: 12.2 MG/DL (ref 8–23)
CALCIUM SERPL-MCNC: 9 MG/DL (ref 8.8–10.4)
CHLORIDE SERPL-SCNC: 105 MMOL/L (ref 98–107)
CREAT SERPL-MCNC: 0.62 MG/DL (ref 0.67–1.17)
EGFRCR SERPLBLD CKD-EPI 2021: >90 ML/MIN/1.73M2
EOSINOPHIL # BLD AUTO: 0.4 10E3/UL (ref 0–0.7)
EOSINOPHIL NFR BLD AUTO: 8 %
ERYTHROCYTE [DISTWIDTH] IN BLOOD BY AUTOMATED COUNT: 13.2 % (ref 10–15)
GLUCOSE SERPL-MCNC: 106 MG/DL (ref 70–99)
HCO3 SERPL-SCNC: 23 MMOL/L (ref 22–29)
HCT VFR BLD AUTO: 38.3 % (ref 40–53)
HGB BLD-MCNC: 14.1 G/DL (ref 13.3–17.7)
IMM GRANULOCYTES # BLD: 0 10E3/UL
IMM GRANULOCYTES NFR BLD: 0 %
LYMPHOCYTES # BLD AUTO: 0.9 10E3/UL (ref 0.8–5.3)
LYMPHOCYTES NFR BLD AUTO: 20 %
MCH RBC QN AUTO: 32.7 PG (ref 26.5–33)
MCHC RBC AUTO-ENTMCNC: 36.8 G/DL (ref 31.5–36.5)
MCV RBC AUTO: 89 FL (ref 78–100)
MONOCYTES # BLD AUTO: 0.6 10E3/UL (ref 0–1.3)
MONOCYTES NFR BLD AUTO: 12 %
NEUTROPHILS # BLD AUTO: 2.8 10E3/UL (ref 1.6–8.3)
NEUTROPHILS NFR BLD AUTO: 60 %
NRBC # BLD AUTO: 0 10E3/UL
NRBC BLD AUTO-RTO: 0 /100
PLATELET # BLD AUTO: 143 10E3/UL (ref 150–450)
POTASSIUM SERPL-SCNC: 3.6 MMOL/L (ref 3.4–5.3)
PROT SERPL-MCNC: 6.2 G/DL (ref 6.4–8.3)
RBC # BLD AUTO: 4.31 10E6/UL (ref 4.4–5.9)
SODIUM SERPL-SCNC: 142 MMOL/L (ref 135–145)
WBC # BLD AUTO: 4.7 10E3/UL (ref 4–11)

## 2025-04-15 PROCEDURE — 85004 AUTOMATED DIFF WBC COUNT: CPT

## 2025-04-15 PROCEDURE — 84155 ASSAY OF PROTEIN SERUM: CPT

## 2025-04-15 PROCEDURE — 84153 ASSAY OF PSA TOTAL: CPT

## 2025-04-15 PROCEDURE — 84450 TRANSFERASE (AST) (SGOT): CPT

## 2025-04-15 PROCEDURE — 36415 COLL VENOUS BLD VENIPUNCTURE: CPT

## 2025-04-16 LAB — PSA SERPL DL<=0.01 NG/ML-MCNC: <0.01 NG/ML (ref 0–6.5)

## 2025-07-15 ENCOUNTER — LAB (OUTPATIENT)
Dept: INFUSION THERAPY | Facility: HOSPITAL | Age: 75
End: 2025-07-15
Attending: INTERNAL MEDICINE
Payer: COMMERCIAL

## 2025-07-15 DIAGNOSIS — C61 PRIMARY MALIGNANT NEOPLASM OF PROSTATE (H): ICD-10-CM

## 2025-07-15 LAB
ALBUMIN SERPL BCG-MCNC: 4.2 G/DL (ref 3.5–5.2)
ALP SERPL-CCNC: 89 U/L (ref 40–150)
ALT SERPL W P-5'-P-CCNC: 14 U/L (ref 0–70)
ANION GAP SERPL CALCULATED.3IONS-SCNC: 11 MMOL/L (ref 7–15)
AST SERPL W P-5'-P-CCNC: 18 U/L (ref 0–45)
BASOPHILS # BLD AUTO: 0.1 10E3/UL (ref 0–0.2)
BASOPHILS NFR BLD AUTO: 1 %
BILIRUB SERPL-MCNC: 0.6 MG/DL
BUN SERPL-MCNC: 17.9 MG/DL (ref 8–23)
CALCIUM SERPL-MCNC: 9.2 MG/DL (ref 8.8–10.4)
CHLORIDE SERPL-SCNC: 104 MMOL/L (ref 98–107)
CREAT SERPL-MCNC: 0.64 MG/DL (ref 0.67–1.17)
EGFRCR SERPLBLD CKD-EPI 2021: >90 ML/MIN/1.73M2
EOSINOPHIL # BLD AUTO: 0.4 10E3/UL (ref 0–0.7)
EOSINOPHIL NFR BLD AUTO: 5 %
ERYTHROCYTE [DISTWIDTH] IN BLOOD BY AUTOMATED COUNT: 12.3 % (ref 10–15)
GLUCOSE SERPL-MCNC: 105 MG/DL (ref 70–99)
HCO3 SERPL-SCNC: 24 MMOL/L (ref 22–29)
HCT VFR BLD AUTO: 40.6 % (ref 40–53)
HGB BLD-MCNC: 14.4 G/DL (ref 13.3–17.7)
IMM GRANULOCYTES # BLD: 0 10E3/UL
IMM GRANULOCYTES NFR BLD: 0 %
LYMPHOCYTES # BLD AUTO: 1 10E3/UL (ref 0.8–5.3)
LYMPHOCYTES NFR BLD AUTO: 14 %
MCH RBC QN AUTO: 31.6 PG (ref 26.5–33)
MCHC RBC AUTO-ENTMCNC: 35.5 G/DL (ref 31.5–36.5)
MCV RBC AUTO: 89 FL (ref 78–100)
MONOCYTES # BLD AUTO: 0.7 10E3/UL (ref 0–1.3)
MONOCYTES NFR BLD AUTO: 10 %
NEUTROPHILS # BLD AUTO: 4.9 10E3/UL (ref 1.6–8.3)
NEUTROPHILS NFR BLD AUTO: 70 %
NRBC # BLD AUTO: 0 10E3/UL
NRBC BLD AUTO-RTO: 0 /100
PLATELET # BLD AUTO: 146 10E3/UL (ref 150–450)
POTASSIUM SERPL-SCNC: 3.9 MMOL/L (ref 3.4–5.3)
PROT SERPL-MCNC: 6.1 G/DL (ref 6.4–8.3)
PSA SERPL DL<=0.01 NG/ML-MCNC: <0.01 NG/ML (ref 0–6.5)
RBC # BLD AUTO: 4.56 10E6/UL (ref 4.4–5.9)
SODIUM SERPL-SCNC: 139 MMOL/L (ref 135–145)
WBC # BLD AUTO: 7 10E3/UL (ref 4–11)

## 2025-07-15 PROCEDURE — 85004 AUTOMATED DIFF WBC COUNT: CPT

## 2025-07-15 PROCEDURE — 82040 ASSAY OF SERUM ALBUMIN: CPT

## 2025-07-15 PROCEDURE — 84153 ASSAY OF PSA TOTAL: CPT

## 2025-07-15 PROCEDURE — 36415 COLL VENOUS BLD VENIPUNCTURE: CPT

## 2025-07-21 ENCOUNTER — ONCOLOGY VISIT (OUTPATIENT)
Dept: ONCOLOGY | Facility: HOSPITAL | Age: 75
End: 2025-07-21
Attending: INTERNAL MEDICINE
Payer: COMMERCIAL

## 2025-07-21 VITALS
OXYGEN SATURATION: 99 % | BODY MASS INDEX: 31.7 KG/M2 | HEIGHT: 69 IN | HEART RATE: 56 BPM | DIASTOLIC BLOOD PRESSURE: 73 MMHG | RESPIRATION RATE: 16 BRPM | TEMPERATURE: 97.2 F | WEIGHT: 214 LBS | SYSTOLIC BLOOD PRESSURE: 137 MMHG

## 2025-07-21 DIAGNOSIS — C61 PRIMARY MALIGNANT NEOPLASM OF PROSTATE (H): ICD-10-CM

## 2025-07-21 PROCEDURE — 99213 OFFICE O/P EST LOW 20 MIN: CPT | Performed by: INTERNAL MEDICINE

## 2025-07-21 PROCEDURE — G0463 HOSPITAL OUTPT CLINIC VISIT: HCPCS | Performed by: INTERNAL MEDICINE

## 2025-07-21 PROCEDURE — G2211 COMPLEX E/M VISIT ADD ON: HCPCS | Performed by: INTERNAL MEDICINE

## 2025-07-21 ASSESSMENT — PAIN SCALES - GENERAL: PAINLEVEL_OUTOF10: NO PAIN (0)

## 2025-07-21 NOTE — LETTER
"7/21/2025      Sage Jones  [address]  [place] [address]      Dear Colleague,    Thank you for referring your patient, Sage Jones, to the [facility]. Please see a copy of my visit note below.    Oncology Rooming Note    July 21, 2025 2:55 PM   Sage Jones is a 75 year old male who presents for:    Chief Complaint   Patient presents with     Oncology Clinic Visit       Primary malignant neoplasm of prostate        Initial Vitals: /73 (BP Location: Right arm, Patient Position: Sitting, Cuff Size: Adult Regular)   Pulse 56   Temp 97.2  F (36.2  C) (Tympanic)   Resp 16   Ht 1.753 m (5' 9\")   Wt 97.1 kg (214 lb)   SpO2 99%   BMI 31.60 kg/m   Estimated body mass index is 31.6 kg/m  as calculated from the following:    Height as of this encounter: 1.753 m (5' 9\").    Weight as of this encounter: 97.1 kg (214 lb). Body surface area is 2.17 meters squared.  No Pain (0) Comment: Data Unavailable   No LMP for male patient.  Allergies reviewed: Yes  Medications reviewed: Yes    Medications: Medication refills not needed today.  Pharmacy name entered into [employer]: [facility] [address] [place] [address]    PHQ9:  Did this patient require a PHQ9?: No      Clinical concerns:  6 month follow up      Erika Mae              [facility] Hematology and Oncology Progress Note    Patient: Sage Jones  MRN: [id number]  Date of Service: Jul 21, 2025        Assessment and Plan:    1.  Prostate cancer: PSA from July 15 was checked.  It is undetectable.  He is doing well.  He has not had any treatment since October 2023.  Asymptomatic.  No clinical evidence of progression.  Will see him every 3 months to check his labs.    We can restart the Eligard when his PSA starts to increase.  At that time we can also consider using enzalutamide as he did not tolerate abiraterone well.      ECOG Performance  0    Diagnosis:    1.  Prostate " cancer:  Diagnosed June 2022. At the time of diagnosis there was suspicion of marek metastases in the bilateral iliac nodes.  Initial MRI showed a PI-RADS 5 lesion measuring 4 x 5 x 6.3 cm.  There was extracapsular extension involving the rectal wall and external inner sphincter.  Also question of bilateral iliac marek mets with the largest measuring 7.6 mm. TURBT revealed a Macarena 4+5 with extraprostatic extension and perineural invasion. He was staged as IIIC (cT3a cN0 cM0) grade group 5.  A PSMA PET scan September 1, 2022 showed disease confined to the prostate.  PSA was 92.  Stage T3a.     Treatment:    He initially received Firmagon and then Eligard (6 month), abiraterone and prednisone in October 2022.    Radiation was started November 21, 2022.   Abiraterone and prednisone stopped in Sept., 2023 secondary to depression and extreme weakness.  Eligard was held after his October, 2023 injection.  Secondary to generalized pains.    Interim History:    Rich comes in today for follow-up visit.  He has been feeling much better since holding all of his treatment.  No pain.  No hot flashes or sweats.  Energy and appetite are okay.    Review of Systems:    As above in the history.     Review of Systems otherwise Negative for:  General: chills, fever or night sweats  Psychological: anxiety or depression  Ophthalmic: blurry vision, double vision or loss of vision, vision change  ENT: epistaxis, oral lesions, hearing changes  Hematological and Lymphatic: bleeding, bruising, jaundice, swollen lymph nodes  Endocrine: hot flashes, unexpected weight changes  Respiratory: cough, hemoptysis, orthopnea or shortness of breath/ORTIZ  Cardiovascular: chest pain, edema, palpitations or PND  Gastrointestinal: abdominal pain, blood in stools, change in bowel habits, constipation, diarrhea or nausea/vomiting  Genito-Urinary: change in urinary stream, incontinence, frequency/urgency  Musculoskeletal: joint pain, stiffness, swelling,  "muscle pain  Neurological: dizziness, headaches, numbness/tingling  Dermatological: lumps and rash    Past History:    Past Medical History:   Diagnosis Date     Mumps      Physical Exam:    /73 (BP Location: Right arm, Patient Position: Sitting, Cuff Size: Adult Regular)   Pulse 56   Temp 97.2  F (36.2  C) (Tympanic)   Resp 16   Ht 1.753 m (5' 9\")   Wt 97.1 kg (214 lb)   SpO2 99%   BMI 31.60 kg/m      General: patient appears stated age of 73 year old. Nontoxic and in no distress.   HEENT: Head: atraumatic, normocephalic. Sclerae anicteric.  Chest:  Normal respiratory effort  Cardiac:  No edema.   Abdomen: abdomen is non-distended  Extremities: normal tone and muscle bulk.  Skin: no lesions or rash on visible skin. Warm and dry.   CNS: alert and oriented. Grossly non-focal.   Psychiatric: normal mood and affect.     Lab Results:    Recent Results (from the past week)   Comprehensive metabolic panel   Result Value Ref Range    Sodium 139 135 - 145 mmol/L    Potassium 3.9 3.4 - 5.3 mmol/L    Carbon Dioxide (CO2) 24 22 - 29 mmol/L    Anion Gap 11 7 - 15 mmol/L    Urea Nitrogen 17.9 8.0 - 23.0 mg/dL    Creatinine 0.64 (L) 0.67 - 1.17 mg/dL    GFR Estimate >90 >60 mL/min/1.73m2    Calcium 9.2 8.8 - 10.4 mg/dL    Chloride 104 98 - 107 mmol/L    Glucose 105 (H) 70 - 99 mg/dL    Alkaline Phosphatase 89 40 - 150 U/L    AST 18 0 - 45 U/L    ALT 14 0 - 70 U/L    Protein Total 6.1 (L) 6.4 - 8.3 g/dL    Albumin 4.2 3.5 - 5.2 g/dL    Bilirubin Total 0.6 <=1.2 mg/dL   PSA, tumor marker   Result Value Ref Range    PSA Tumor Marker <0.01 0.00 - 6.50 ng/mL   CBC with platelets and differential   Result Value Ref Range    WBC Count 7.0 4.0 - 11.0 10e3/uL    RBC Count 4.56 4.40 - 5.90 10e6/uL    Hemoglobin 14.4 13.3 - 17.7 g/dL    Hematocrit 40.6 40.0 - 53.0 %    MCV 89 78 - 100 fL    MCH 31.6 26.5 - 33.0 pg    MCHC 35.5 31.5 - 36.5 g/dL    RDW 12.3 10.0 - 15.0 %    Platelet Count 146 (L) 150 - 450 10e3/uL    % " Neutrophils 70 %    % Lymphocytes 14 %    % Monocytes 10 %    % Eosinophils 5 %    % Basophils 1 %    % Immature Granulocytes 0 %    NRBCs per 100 WBC 0 <1 /100    Absolute Neutrophils 4.9 1.6 - 8.3 10e3/uL    Absolute Lymphocytes 1.0 0.8 - 5.3 10e3/uL    Absolute Monocytes 0.7 0.0 - 1.3 10e3/uL    Absolute Eosinophils 0.4 0.0 - 0.7 10e3/uL    Absolute Basophils 0.1 0.0 - 0.2 10e3/uL    Absolute Immature Granulocytes 0.0 <=0.4 10e3/uL    Absolute NRBCs 0.0 10e3/uL     Imaging:    No results found.      Signed by: Reed Bright MD      Again, thank you for allowing me to participate in the care of your patient.        Sincerely,        Reed Bright MD    Electronically signed

## 2025-07-21 NOTE — PROGRESS NOTES
[facility] Hematology and Oncology Progress Note    Patient: Sage COOMBS Gritten  MRN: [id number]  Date of Service: Jul 21, 2025        Assessment and Plan:    1.  Prostate cancer: PSA from July 15 was checked.  It is undetectable.  He is doing well.  He has not had any treatment since October 2023.  Asymptomatic.  No clinical evidence of progression.  Will see him every 3 months to check his labs.    We can restart the Eligard when his PSA starts to increase.  At that time we can also consider using enzalutamide as he did not tolerate abiraterone well.      ECOG Performance  0    Diagnosis:    1.  Prostate cancer:  Diagnosed June 2022. At the time of diagnosis there was suspicion of marek metastases in the bilateral iliac nodes.  Initial MRI showed a PI-RADS 5 lesion measuring 4 x 5 x 6.3 cm.  There was extracapsular extension involving the rectal wall and external inner sphincter.  Also question of bilateral iliac marek mets with the largest measuring 7.6 mm. TURBT revealed a Macarena 4+5 with extraprostatic extension and perineural invasion. He was staged as IIIC (cT3a cN0 cM0) grade group 5.  A PSMA PET scan September 1, 2022 showed disease confined to the prostate.  PSA was 92.  Stage T3a.     Treatment:    He initially received Firmagon and then Eligard (6 month), abiraterone and prednisone in October 2022.    Radiation was started November 21, 2022.   Abiraterone and prednisone stopped in Sept., 2023 secondary to depression and extreme weakness.  Eligard was held after his October, 2023 injection.  Secondary to generalized pains.    Interim History:    Rich comes in today for follow-up visit.  He has been feeling much better since holding all of his treatment.  No pain.  No hot flashes or sweats.  Energy and appetite are okay.    Review of Systems:    As above in the history.     Review of Systems otherwise Negative for:  General: chills, fever or night sweats  Psychological: anxiety or  "depression  Ophthalmic: blurry vision, double vision or loss of vision, vision change  ENT: epistaxis, oral lesions, hearing changes  Hematological and Lymphatic: bleeding, bruising, jaundice, swollen lymph nodes  Endocrine: hot flashes, unexpected weight changes  Respiratory: cough, hemoptysis, orthopnea or shortness of breath/ORTIZ  Cardiovascular: chest pain, edema, palpitations or PND  Gastrointestinal: abdominal pain, blood in stools, change in bowel habits, constipation, diarrhea or nausea/vomiting  Genito-Urinary: change in urinary stream, incontinence, frequency/urgency  Musculoskeletal: joint pain, stiffness, swelling, muscle pain  Neurological: dizziness, headaches, numbness/tingling  Dermatological: lumps and rash    Past History:    Past Medical History:   Diagnosis Date    Mumps      Physical Exam:    /73 (BP Location: Right arm, Patient Position: Sitting, Cuff Size: Adult Regular)   Pulse 56   Temp 97.2  F (36.2  C) (Tympanic)   Resp 16   Ht 1.753 m (5' 9\")   Wt 97.1 kg (214 lb)   SpO2 99%   BMI 31.60 kg/m      General: patient appears stated age of 73 year old. Nontoxic and in no distress.   HEENT: Head: atraumatic, normocephalic. Sclerae anicteric.  Chest:  Normal respiratory effort  Cardiac:  No edema.   Abdomen: abdomen is non-distended  Extremities: normal tone and muscle bulk.  Skin: no lesions or rash on visible skin. Warm and dry.   CNS: alert and oriented. Grossly non-focal.   Psychiatric: normal mood and affect.     Lab Results:    Recent Results (from the past week)   Comprehensive metabolic panel   Result Value Ref Range    Sodium 139 135 - 145 mmol/L    Potassium 3.9 3.4 - 5.3 mmol/L    Carbon Dioxide (CO2) 24 22 - 29 mmol/L    Anion Gap 11 7 - 15 mmol/L    Urea Nitrogen 17.9 8.0 - 23.0 mg/dL    Creatinine 0.64 (L) 0.67 - 1.17 mg/dL    GFR Estimate >90 >60 mL/min/1.73m2    Calcium 9.2 8.8 - 10.4 mg/dL    Chloride 104 98 - 107 mmol/L    Glucose 105 (H) 70 - 99 mg/dL    Alkaline " Phosphatase 89 40 - 150 U/L    AST 18 0 - 45 U/L    ALT 14 0 - 70 U/L    Protein Total 6.1 (L) 6.4 - 8.3 g/dL    Albumin 4.2 3.5 - 5.2 g/dL    Bilirubin Total 0.6 <=1.2 mg/dL   PSA, tumor marker   Result Value Ref Range    PSA Tumor Marker <0.01 0.00 - 6.50 ng/mL   CBC with platelets and differential   Result Value Ref Range    WBC Count 7.0 4.0 - 11.0 10e3/uL    RBC Count 4.56 4.40 - 5.90 10e6/uL    Hemoglobin 14.4 13.3 - 17.7 g/dL    Hematocrit 40.6 40.0 - 53.0 %    MCV 89 78 - 100 fL    MCH 31.6 26.5 - 33.0 pg    MCHC 35.5 31.5 - 36.5 g/dL    RDW 12.3 10.0 - 15.0 %    Platelet Count 146 (L) 150 - 450 10e3/uL    % Neutrophils 70 %    % Lymphocytes 14 %    % Monocytes 10 %    % Eosinophils 5 %    % Basophils 1 %    % Immature Granulocytes 0 %    NRBCs per 100 WBC 0 <1 /100    Absolute Neutrophils 4.9 1.6 - 8.3 10e3/uL    Absolute Lymphocytes 1.0 0.8 - 5.3 10e3/uL    Absolute Monocytes 0.7 0.0 - 1.3 10e3/uL    Absolute Eosinophils 0.4 0.0 - 0.7 10e3/uL    Absolute Basophils 0.1 0.0 - 0.2 10e3/uL    Absolute Immature Granulocytes 0.0 <=0.4 10e3/uL    Absolute NRBCs 0.0 10e3/uL     Imaging:    No results found.      Signed by: Reed Bright MD

## 2025-07-21 NOTE — PROGRESS NOTES
"Oncology Rooming Note    July 21, 2025 2:55 PM   Sage Jones is a 75 year old male who presents for:    Chief Complaint   Patient presents with    Oncology Clinic Visit       Primary malignant neoplasm of prostate        Initial Vitals: /73 (BP Location: Right arm, Patient Position: Sitting, Cuff Size: Adult Regular)   Pulse 56   Temp 97.2  F (36.2  C) (Tympanic)   Resp 16   Ht 1.753 m (5' 9\")   Wt 97.1 kg (214 lb)   SpO2 99%   BMI 31.60 kg/m   Estimated body mass index is 31.6 kg/m  as calculated from the following:    Height as of this encounter: 1.753 m (5' 9\").    Weight as of this encounter: 97.1 kg (214 lb). Body surface area is 2.17 meters squared.  No Pain (0) Comment: Data Unavailable   No LMP for male patient.  Allergies reviewed: Yes  Medications reviewed: Yes    Medications: Medication refills not needed today.  Pharmacy name entered into [facility]: [facility] [address] [place] [address]    PHQ9:  Did this patient require a PHQ9?: No      Clinical concerns:  6 month follow up      Erika Mae            "

## 2025-08-18 ENCOUNTER — PATIENT OUTREACH (OUTPATIENT)
Dept: CARE COORDINATION | Facility: CLINIC | Age: 75
End: 2025-08-18
Payer: COMMERCIAL

## 2025-09-01 ENCOUNTER — PATIENT OUTREACH (OUTPATIENT)
Dept: CARE COORDINATION | Facility: CLINIC | Age: 75
End: 2025-09-01
Payer: COMMERCIAL